# Patient Record
Sex: MALE | Race: WHITE | NOT HISPANIC OR LATINO | Employment: OTHER | URBAN - METROPOLITAN AREA
[De-identification: names, ages, dates, MRNs, and addresses within clinical notes are randomized per-mention and may not be internally consistent; named-entity substitution may affect disease eponyms.]

---

## 2019-03-11 ENCOUNTER — HOSPITAL ENCOUNTER (EMERGENCY)
Facility: HOSPITAL | Age: 49
Discharge: LEFT BEFORE DISCHARGE | End: 2019-03-11
Attending: EMERGENCY MEDICINE

## 2019-03-11 VITALS — HEIGHT: 68 IN | OXYGEN SATURATION: 94 % | TEMPERATURE: 97.1 F | RESPIRATION RATE: 18 BRPM | HEART RATE: 118 BPM

## 2019-12-20 ENCOUNTER — HOSPITAL ENCOUNTER (OUTPATIENT)
Dept: GENERAL RADIOLOGY | Facility: HOSPITAL | Age: 49
Discharge: HOME | End: 2019-12-20

## 2020-05-04 ENCOUNTER — OFFICE VISIT CONVERTED (OUTPATIENT)
Dept: OTOLARYNGOLOGY | Facility: CLINIC | Age: 50
End: 2020-05-04
Attending: OTOLARYNGOLOGY

## 2020-06-04 ENCOUNTER — OFFICE VISIT CONVERTED (OUTPATIENT)
Dept: NEUROLOGY | Facility: CLINIC | Age: 50
End: 2020-06-04
Attending: PSYCHIATRY & NEUROLOGY

## 2020-06-04 ENCOUNTER — CONVERSION ENCOUNTER (OUTPATIENT)
Dept: NEUROLOGY | Facility: CLINIC | Age: 50
End: 2020-06-04

## 2020-07-10 ENCOUNTER — HOSPITAL ENCOUNTER (OUTPATIENT)
Dept: MRI IMAGING | Facility: HOSPITAL | Age: 50
Discharge: HOME | End: 2020-07-10
Attending: PSYCHIATRY & NEUROLOGY

## 2021-05-10 NOTE — H&P
"   History and Physical      Patient Name: Haider Jacobs   Patient ID: [id number]   Sex: Male   YOB: 1970    Primary Care Provider: Ronen Ohara MD   Referring Provider: Ronen Ohara MD    Visit Date: May 4, 2020    Provider: Galen East MD   Location: [facility]   Location Address: [address] [place] [address], [address]  [id number]   Location Phone: [phone]          Chief Complaint     \"You have got to do something.\"       History Of Present Illness  Haider Jacobs is a 49 year old male with past medical history significant for generalized anxiety disorder, major depressive disorder, chronic pain syndrome, chronic rhinitis, and a previous spinal cord injury in 2006 who presents to the office today as a consult from Ronen Ohara MD for evaluation of tinnitus. He tells me that he initially noticed a bilateral hissing to occasional high-pitched ringing or sizzling sound around 1 year ago. This has steadily worsened over the last 6 months to the point where he is having difficulty sleeping. It seems to be worse if he clenches his teeth or moves his neck in certain ways. It is causing him significant anxiety. He has tried icing his neck without improvement. He has also been on duloxetine in the past which caused muscle spasticity. He has cut out caffeine for period of time but did not notice any improvement. He is only drinking 1 cup of coffee daily. He denies any aspirin use or over-the-counter supplements. He denies any issues with hearing loss, prior otologic trauma, or otologic surgery. He has never had any problems with ear infections. He is not experiencing any or vertigo. He does report occasional dizziness for which he is currently taking meclizine. He has a 1 pack/day smoker.       Past Medical History  Neuropathy; Pain management; Sleep apnea; Spinal cord injury unspecified site of spinal cord; Tinnitus         Past Surgical History  Spinal Surgery "         Medication List  baclofen 20 mg oral tablet; buspirone 15 mg oral tablet; gabapentin 400 mg oral capsule; hydrocodone-acetaminophen 7.5-325 mg oral tablet; hydroxyzine HCl 50 mg oral tablet; meclizine 25 mg oral tablet         Allergy List  NO KNOWN DRUG ALLERGIES         Family Medical History  Family history of stroke; Family history of heart disease; Family history of colon cancer in father         Social History  Tobacco (Current every day)         Review of Systems  · Constitutional  o Denies  o : fever, night sweats, weight loss  · Eyes  o Denies  o : discharge from eye, impaired vision  · HENT  o Admits  o : *See HPI  · Cardiovascular  o Denies  o : chest pain, irregular heart beats  · Respiratory  o Denies  o : shortness of breath, wheezing, coughing up blood  · Gastrointestinal  o Admits  o : heartburn, reflux  o Denies  o : vomiting blood  · Genitourinary  o Denies  o : frequency  · Integument  o Denies  o : rash, skin dryness  · Neurologic  o Denies  o : seizures, loss of balance, loss of consciousness, dizziness  · Endocrine  o Denies  o : cold intolerance, heat intolerance  · Heme-Lymph  o Denies  o : easy bleeding, anemia      Vitals  Date Time BP Position Site L\R Cuff Size HR RR TEMP (F) WT  HT  BMI kg/m2 BSA m2 O2 Sat HC       05/04/2020 01:34 PM      82 - R 16 98.4     94 %          Physical Examination  · Constitutional  o Appearance  o : well developed, well-nourished, alert and in no acute distress, voice clear and strong  · Head and Face  o Head  o :   § Inspection  § : no deformities or lesions  o Face  o :   § Inspection  § : No facial lesions; House-Brackmann I/VI bilaterally  § Palpation  § : No TMJ crepitus nor  muscle tenderness bilaterally  · Eyes  o Vision  o :   § Visual Fields  § : Extraocular movements are intact. No spontaneous or gaze-induced nystagmus.  o Conjunctivae  o : clear  o Sclerae  o : clear  o Pupils and Irises  o : pupils equal, round, and reactive to  light.   · Ears, Nose, Mouth and Throat  o Ears  o :   § External Ears  § : appearance within normal limits, no lesions present  § Otoscopic Examination  § : tympanic membrane appearance within normal limits bilaterally without perforations, well-aerated middle ears  § Hearing  § : intact to conversational voice both ears  o Nose  o :   § External Nose  § : appearance normal  § Intranasal Exam  § : mucosa within normal limits, vestibules normal, no intranasal lesions present, septum deviated to the left, sinuses non tender to percussion  o Oral Cavity  o :   § Oral Mucosa  § : oral mucosa normal without pallor or cyanosis  § Lips  § : lip appearance normal  § Teeth  § : normal dentition for age  § Gums  § : gums pink, non-swollen, no bleeding present  § Tongue  § : tongue appearance normal; normal mobility  § Palate  § : hard palate normal, soft palate appearance normal with symmetric mobility  o Throat  o :   § Oropharynx  § : no inflammation or lesions present, tonsils within normal limits  § Hypopharynx  § : Deferred secondary to gag reflex  § Larynx  § : Deferred secondary to gag reflex  · Neck  o Inspection/Palpation  o : normal appearance, no masses or tenderness, trachea midline; thyroid size normal, nontender, no nodules or masses present on palpation  · Respiratory  o Respiratory Effort  o : breathing unlabored  o Inspection of Chest  o : normal appearance, no retractions  · Cardiovascular  o Heart  o : regular rate and rhythm  · Lymphatic  o Neck  o : no lymphadenopathy present  o Supraclavicular Nodes  o : no lymphadenopathy present  o Preauricular Nodes  o : no lymphadenopathy present  · Skin and Subcutaneous Tissue  o General Inspection  o : Regarding face and neck - there are no rashes present, no lesions present, and no areas of discoloration  · Neurologic  o Cranial Nerves  o : cranial nerves II-XII are grossly intact bilaterally  o Gait and Station  o : normal gait, able to stand without  diffculty  · Psychiatric  o Judgement and Insight  o : judgment and insight intact  o Mood and Affect  o : mood normal, affect appropriate          Assessment  · Tinnitus of both ears     388.30/H93.13  · Anxiety     300.00/F41.9    Problems Reconciled  Plan  · Orders  o Audiometry, comprehensive, threshold evaluation and speech recognition [facility] ([id number]) - 388.30/H93.13, 300.00/F41.9 - 05/04/2020  o Tympanogram (Impedance Testing) [facility] ([id number]) - 388.30/H93.13, 300.00/F41.9 - 05/04/2020  · Medications  o nortriptyline 25 mg oral capsule   SIG: take 1 capsule (25 mg) by oral route once daily at bedtime for 30 days   DISP: (30) capsules with 3 refills  Prescribed on 05/04/2020     o Medications have been Reconciled  o Transition of Care or Provider Policy  · Instructions  o Impressions and findings were discussed Mr. Jacobs and his wife at great length. Currently, he is reporting bilateral tinnitus which has been present for a year but is significantly worsening over the past 6 months. It is causing a significant exacerbation of his anxiety symptoms and he is not sleeping well. He also has a history of depression for which he is not currently on any medications. We discussed the pathophysiology and natural history of tinnitus. Coping mechanisms were discussed. Options for further evaluation were discussed and I have recommended an audiogram for further work-up of his hearing. We discussed the association of tinnitus with hearing loss. At this point, he is quite anxious in the office and will therefore try him on a course of nortriptyline. We also discussed the possibility of trialing melatonin at home. He will follow-up after completion of his audiogram or sooner if needed.  o Electronically Identified Patient Education Materials Provided Electronically  · Correspondence  o ENT Letter to Referring MD (Ronen Ohara MD) - 05/04/2020            Electronically Signed by: Galen East MD -Author on May 4, 2020  02:28:20 PM
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
## 2021-05-15 VITALS
HEART RATE: 73 BPM | TEMPERATURE: 98.8 F | HEIGHT: 68 IN | DIASTOLIC BLOOD PRESSURE: 69 MMHG | BODY MASS INDEX: 31.83 KG/M2 | WEIGHT: 210 LBS | SYSTOLIC BLOOD PRESSURE: 110 MMHG

## 2021-05-15 VITALS — OXYGEN SATURATION: 94 % | RESPIRATION RATE: 16 BRPM | HEART RATE: 82 BPM | TEMPERATURE: 98.4 F

## 2021-08-26 ENCOUNTER — APPOINTMENT (OUTPATIENT)
Dept: MRI IMAGING | Facility: HOSPITAL | Age: 51
End: 2021-08-26

## 2021-08-26 ENCOUNTER — HOSPITAL ENCOUNTER (EMERGENCY)
Facility: HOSPITAL | Age: 51
Discharge: HOME | End: 2021-08-27
Attending: EMERGENCY MEDICINE | Admitting: EMERGENCY MEDICINE

## 2021-08-26 VITALS
WEIGHT: 189.15 LBS | BODY MASS INDEX: 28.67 KG/M2 | TEMPERATURE: 98.9 F | OXYGEN SATURATION: 95 % | DIASTOLIC BLOOD PRESSURE: 87 MMHG | SYSTOLIC BLOOD PRESSURE: 116 MMHG | RESPIRATION RATE: 17 BRPM | HEART RATE: 76 BPM | HEIGHT: 68 IN

## 2021-08-26 DIAGNOSIS — M54.16 LUMBAR BACK PAIN WITH RADICULOPATHY AFFECTING LOWER EXTREMITY: Primary | ICD-10-CM

## 2021-08-26 PROCEDURE — 96374 THER/PROPH/DIAG INJ IV PUSH: CPT

## 2021-08-26 PROCEDURE — 25010000002 LORAZEPAM PER 2 MG: Performed by: EMERGENCY MEDICINE

## 2021-08-26 PROCEDURE — 99283 EMERGENCY DEPT VISIT LOW MDM: CPT

## 2021-08-26 PROCEDURE — 72148 MRI LUMBAR SPINE W/O DYE: CPT

## 2021-08-26 RX ORDER — DIAZEPAM 10 MG/1
TABLET ORAL
COMMUNITY

## 2021-08-26 RX ORDER — HYDROXYZINE 50 MG/1
TABLET, FILM COATED ORAL
COMMUNITY

## 2021-08-26 RX ORDER — GABAPENTIN 400 MG/1
CAPSULE ORAL
COMMUNITY

## 2021-08-26 RX ORDER — BUSPIRONE HYDROCHLORIDE 15 MG/1
TABLET ORAL
COMMUNITY

## 2021-08-26 RX ORDER — SODIUM CHLORIDE 0.9 % (FLUSH) 0.9 %
10 SYRINGE (ML) INJECTION AS NEEDED
Status: DISCONTINUED | OUTPATIENT
Start: 2021-08-26 | End: 2021-08-27 | Stop reason: HOSPADM

## 2021-08-26 RX ORDER — BACLOFEN 20 MG/1
TABLET ORAL
COMMUNITY

## 2021-08-26 RX ORDER — LORAZEPAM 2 MG/ML
1 INJECTION INTRAMUSCULAR ONCE
Status: COMPLETED | OUTPATIENT
Start: 2021-08-26 | End: 2021-08-26

## 2021-08-26 RX ORDER — OXYCODONE HYDROCHLORIDE AND ACETAMINOPHEN 5; 325 MG/1; MG/1
1 TABLET ORAL ONCE
Status: COMPLETED | OUTPATIENT
Start: 2021-08-26 | End: 2021-08-26

## 2021-08-26 RX ORDER — HYDROCODONE BITARTRATE AND ACETAMINOPHEN 7.5; 325 MG/1; MG/1
TABLET ORAL
COMMUNITY

## 2021-08-26 RX ADMIN — OXYCODONE HYDROCHLORIDE AND ACETAMINOPHEN 1 TABLET: 5; 325 TABLET ORAL at 22:50

## 2021-08-26 RX ADMIN — LORAZEPAM 1 MG: 2 INJECTION INTRAMUSCULAR; INTRAVENOUS at 20:38
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
## 2021-08-27 NOTE — ED NOTES
PATIENT REQUESTING FOOD, DRINK, AND PAIN MEDICATIONS. DR. SHOOK NOTIFIED.     Angelita Batista, [employer]EIGN  08/26/21 5259

## 2021-09-02 ENCOUNTER — HOSPITAL ENCOUNTER (EMERGENCY)
Facility: HOSPITAL | Age: 51
Discharge: LEFT BEFORE DISCHARGE | End: 2021-09-02

## 2021-09-02 VITALS
SYSTOLIC BLOOD PRESSURE: 130 MMHG | DIASTOLIC BLOOD PRESSURE: 90 MMHG | HEART RATE: 95 BPM | RESPIRATION RATE: 22 BRPM | TEMPERATURE: 98.4 F | BODY MASS INDEX: 28.27 KG/M2 | OXYGEN SATURATION: 96 % | WEIGHT: 186.51 LBS | HEIGHT: 68 IN

## 2021-09-02 LAB
ALBUMIN SERPL-MCNC: 4.4 G/DL (ref 3.5–5.2)
ALBUMIN/GLOB SERPL: 1.7 G/DL
ALP SERPL-CCNC: 88 U/L (ref 39–117)
ALT SERPL W P-5'-P-CCNC: 17 U/L (ref 1–41)
ANION GAP SERPL CALCULATED.3IONS-SCNC: 12.9 MMOL/L (ref 5–15)
APAP SERPL-MCNC: <5 MCG/ML (ref 0–30)
AST SERPL-CCNC: 30 U/L (ref 1–40)
BASOPHILS # BLD AUTO: 0.05 10*3/MM3 (ref 0–0.2)
BASOPHILS NFR BLD AUTO: 0.4 % (ref 0–1.5)
BILIRUB SERPL-MCNC: 0.5 MG/DL (ref 0–1.2)
BUN SERPL-MCNC: 22 MG/DL (ref 6–20)
BUN/CREAT SERPL: 25.9 (ref 7–25)
CALCIUM SPEC-SCNC: 9.6 MG/DL (ref 8.6–10.5)
CHLORIDE SERPL-SCNC: 106 MMOL/L (ref 98–107)
CO2 SERPL-SCNC: 22.1 MMOL/L (ref 22–29)
CREAT SERPL-MCNC: 0.85 MG/DL (ref 0.76–1.27)
DEPRECATED RDW RBC AUTO: 41.6 FL (ref 37–54)
EOSINOPHIL # BLD AUTO: 0.01 10*3/MM3 (ref 0–0.4)
EOSINOPHIL NFR BLD AUTO: 0.1 % (ref 0.3–6.2)
ERYTHROCYTE [DISTWIDTH] IN BLOOD BY AUTOMATED COUNT: 13.3 % (ref 12.3–15.4)
ETHANOL BLD-MCNC: <10 MG/DL (ref 0–10)
ETHANOL UR QL: <0.01 %
GFR SERPL CREATININE-BSD FRML MDRD: 95 ML/MIN/1.73
GLOBULIN UR ELPH-MCNC: 2.6 GM/DL
GLUCOSE SERPL-MCNC: 107 MG/DL (ref 65–99)
HCT VFR BLD AUTO: 46.8 % (ref 37.5–51)
HGB BLD-MCNC: 16.1 G/DL (ref 13–17.7)
IMM GRANULOCYTES # BLD AUTO: 0.03 10*3/MM3 (ref 0–0.05)
IMM GRANULOCYTES NFR BLD AUTO: 0.3 % (ref 0–0.5)
INR PPP: 1.01 (ref 2–3)
LYMPHOCYTES # BLD AUTO: 0.95 10*3/MM3 (ref 0.7–3.1)
LYMPHOCYTES NFR BLD AUTO: 8 % (ref 19.6–45.3)
MCH RBC QN AUTO: 29.9 PG (ref 26.6–33)
MCHC RBC AUTO-ENTMCNC: 34.4 G/DL (ref 31.5–35.7)
MCV RBC AUTO: 86.8 FL (ref 79–97)
MONOCYTES # BLD AUTO: 0.48 10*3/MM3 (ref 0.1–0.9)
MONOCYTES NFR BLD AUTO: 4 % (ref 5–12)
NEUTROPHILS NFR BLD AUTO: 10.41 10*3/MM3 (ref 1.7–7)
NEUTROPHILS NFR BLD AUTO: 87.2 % (ref 42.7–76)
NRBC BLD AUTO-RTO: 0 /100 WBC (ref 0–0.2)
PLATELET # BLD AUTO: 151 10*3/MM3 (ref 140–450)
PMV BLD AUTO: 11.4 FL (ref 6–12)
POTASSIUM SERPL-SCNC: 3.8 MMOL/L (ref 3.5–5.2)
PROT SERPL-MCNC: 7 G/DL (ref 6–8.5)
PROTHROMBIN TIME: 11.1 SECONDS (ref 9.4–12)
RBC # BLD AUTO: 5.39 10*6/MM3 (ref 4.14–5.8)
SALICYLATES SERPL-MCNC: <0.3 MG/DL
SODIUM SERPL-SCNC: 141 MMOL/L (ref 136–145)
TSH SERPL DL<=0.05 MIU/L-ACNC: 0.29 UIU/ML (ref 0.27–4.2)
WBC # BLD AUTO: 11.93 10*3/MM3 (ref 3.4–10.8)

## 2021-09-02 PROCEDURE — 82077 ASSAY SPEC XCP UR&BREATH IA: CPT | Performed by: PHYSICIAN ASSISTANT

## 2021-09-02 PROCEDURE — 84443 ASSAY THYROID STIM HORMONE: CPT | Performed by: PHYSICIAN ASSISTANT

## 2021-09-02 PROCEDURE — 80179 DRUG ASSAY SALICYLATE: CPT | Performed by: PHYSICIAN ASSISTANT

## 2021-09-02 PROCEDURE — 80053 COMPREHEN METABOLIC PANEL: CPT | Performed by: PHYSICIAN ASSISTANT

## 2021-09-02 PROCEDURE — 80143 DRUG ASSAY ACETAMINOPHEN: CPT | Performed by: PHYSICIAN ASSISTANT

## 2021-09-02 PROCEDURE — 85610 PROTHROMBIN TIME: CPT | Performed by: PHYSICIAN ASSISTANT

## 2021-09-02 PROCEDURE — 99211 OFF/OP EST MAY X REQ PHY/QHP: CPT

## 2021-09-02 PROCEDURE — 85025 COMPLETE CBC W/AUTO DIFF WBC: CPT | Performed by: PHYSICIAN ASSISTANT

## 2022-01-16 ENCOUNTER — HOSPITAL ENCOUNTER (EMERGENCY)
Facility: HOSPITAL | Age: 52
Discharge: HOME | End: 2022-01-16
Attending: EMERGENCY MEDICINE | Admitting: EMERGENCY MEDICINE

## 2022-01-16 VITALS
OXYGEN SATURATION: 98 % | HEART RATE: 77 BPM | RESPIRATION RATE: 18 BRPM | BODY MASS INDEX: 28.36 KG/M2 | HEIGHT: 68 IN | TEMPERATURE: 98.6 F | SYSTOLIC BLOOD PRESSURE: 115 MMHG | DIASTOLIC BLOOD PRESSURE: 75 MMHG

## 2022-01-16 DIAGNOSIS — M54.32 SCIATICA OF LEFT SIDE: Primary | ICD-10-CM

## 2022-01-16 PROCEDURE — 25010000002 KETOROLAC TROMETHAMINE PER 15 MG: Performed by: NURSE PRACTITIONER

## 2022-01-16 PROCEDURE — 25010000002 DEXAMETHASONE PER 1 MG: Performed by: NURSE PRACTITIONER

## 2022-01-16 PROCEDURE — 99282 EMERGENCY DEPT VISIT SF MDM: CPT

## 2022-01-16 PROCEDURE — 96372 THER/PROPH/DIAG INJ SC/IM: CPT

## 2022-01-16 RX ORDER — KETOROLAC TROMETHAMINE 30 MG/ML
30 INJECTION, SOLUTION INTRAMUSCULAR; INTRAVENOUS ONCE
Status: COMPLETED | OUTPATIENT
Start: 2022-01-16 | End: 2022-01-16

## 2022-01-16 RX ORDER — DEXAMETHASONE SODIUM PHOSPHATE 10 MG/ML
10 INJECTION INTRAMUSCULAR; INTRAVENOUS ONCE
Status: COMPLETED | OUTPATIENT
Start: 2022-01-16 | End: 2022-01-16

## 2022-01-16 RX ADMIN — DEXAMETHASONE SODIUM PHOSPHATE 10 MG: 10 INJECTION INTRAMUSCULAR; INTRAVENOUS at 11:07

## 2022-01-16 RX ADMIN — KETOROLAC TROMETHAMINE 30 MG: 30 INJECTION, SOLUTION INTRAMUSCULAR; INTRAVENOUS at 11:07

## 2022-01-16 NOTE — ED PROVIDER NOTES
"Subjective   Pt reports \"burning\" and \"shocking\" sensation in his low back that \"goes up and down my spine\" and also down his left leg. He reports this has been happening for 2-3 years. Yesterday he reports he was outside using his three wheel scooter and felt a severe shocking sensation on his left lower back that was worse than previous episodes. He reports that he feels that someone is putting something on his skin that is causing the pain and can be controlled by a person that is watching him. He feels that the substance can conduct electricity and there must be a mechanism that knows when he enters his home because that is when he feels the pain. He believes the substance has been put on his couch and is absorbed in his skin so he has been placing cardboard on his couch before he sits down. He does not believe that pain is coming from his spine \"because I know my body and my injury\".       History provided by:  Patient  Back Pain  Location:  Lumbar spine  Radiates to:  L posterior upper leg  Pain severity:  Severe  Worse during: worse when inside his home.  Progression:  Worsening  Context: not emotional stress, not falling, not jumping from heights, not lifting heavy objects, not MCA, not MVA, not occupational injury, not pedestrian accident, not physical stress, not recent illness, not recent injury and not twisting    Relieved by:  Nothing  Associated symptoms: no abdominal pain, no chest pain, no fever, no headaches and no numbness        Review of Systems   Constitutional: Negative for chills and fever.   HENT: Negative for congestion, ear pain and sore throat.    Eyes: Negative for pain.   Respiratory: Negative for cough, chest tightness and shortness of breath.    Cardiovascular: Negative for chest pain.   Gastrointestinal: Negative for abdominal pain, diarrhea, nausea and vomiting.   Genitourinary: Negative for flank pain and hematuria.   Musculoskeletal: Positive for back pain. Negative for joint " swelling.   Skin: Negative for pallor.   Neurological: Negative for dizziness, seizures, facial asymmetry, light-headedness, numbness and headaches.   All other systems reviewed and are negative.      Past Medical History:   Diagnosis Date   • Anxiety    • Spinal cord injury at C1-C4 level without spinal injury (HCC)        No Known Allergies    Past Surgical History:   Procedure Laterality Date   • BACK SURGERY      C2-7 Fusion       History reviewed. No pertinent family history.    Social History     Socioeconomic History   • Marital status: Single   Tobacco Use   • Smoking status: Current Every Day Smoker     Packs/day: 1.00   • Smokeless tobacco: Never Used   Vaping Use   • Vaping Use: Never used   Substance and Sexual Activity   • Alcohol use: Not Currently   • Drug use: Not Currently   • Sexual activity: Defer           Objective   Physical Exam  Vitals and nursing note reviewed.   Constitutional:       General: He is not in acute distress.     Appearance: Normal appearance. He is not toxic-appearing.   HENT:      Head: Normocephalic and atraumatic.      Mouth/Throat:      Mouth: Mucous membranes are moist.   Eyes:      General: No scleral icterus.  Cardiovascular:      Rate and Rhythm: Normal rate and regular rhythm.      Pulses: Normal pulses.      Heart sounds: Normal heart sounds.   Pulmonary:      Effort: Pulmonary effort is normal. No respiratory distress.      Breath sounds: Normal breath sounds.   Abdominal:      General: Abdomen is flat.      Palpations: Abdomen is soft.      Tenderness: There is no abdominal tenderness.   Musculoskeletal:         General: Normal range of motion.      Cervical back: Normal, normal range of motion and neck supple.      Thoracic back: Normal.      Lumbar back: Normal.   Skin:     General: Skin is warm and dry.   Neurological:      Mental Status: He is alert and oriented to person, place, and time. Mental status is at baseline.         Procedures           ED Course                                                  MDM  Number of Diagnoses or Management Options  Sciatica of left side: new and does not require workup  Diagnosis management comments: Pt reports he has seen his primary provider 2 weeks ago and had blood work performed. He also sees a psychiatrist and agrees to follow up with her. Spoke about how it is possible that the pain and sensation is coming from his spine so he needs to follow up with his primary for referral to neurologist or spine specialist since he does not currently see one. The description of his pain does seem to originate from his spine. He denies recent injury. Since he has established psychiatric care established that will not be worked up on this visit.     The patient´s symptoms are consistent with musculoskeletal back pain. The patient is now resting comfortably, feels better, is alert, talkative, interactive and in no distress. The repeat examination is unremarkable and benign. The patient is neurologically intact and is ambulatory in the ED. The patient has no fever, no bowel or bladder incontinence, no saddle anesthesia, and is otherwise alert and well appearing. The history, physical exam, and diagnostics (if any) do not suggest the presence of acute spinal epidural abscess, acute spinal epidural bleed, cauda equina syndrome, abdominal aortic aneurysm, aortic dissection or other process requiring further testing, treatment or consultation in the emergency department. The vital signs have been stable. The patient's condition is stable and appropriate for discharge. The patient will pursue further outpatient evaluation with the primary care physician or other designated for consulting position as indicated in the discharge instructions.    Risk of Complications, Morbidity, and/or Mortality  Presenting problems: low  Diagnostic procedures: minimal  Management options: low    Patient Progress  Patient progress: stable      Final diagnoses:   Sciatica  of left side       ED Disposition  ED Disposition     ED Disposition Condition Comment    Discharge Stable           Ronen Ohara MD  [address] [place] [address]  [phone]    In 3 days           Medication List      No changes were made to your prescriptions during this visit.          George Spann, APRN  01/16/22 1152

## 2022-01-16 NOTE — DISCHARGE INSTRUCTIONS
Follow up with Dr. Ohara for further workup and possible referral for your back pain. Continue to follow up with your psychiatrist as well.

## 2022-01-16 NOTE — ED NOTES
"Pt states that \"something\" is getting on him and causing him this pain in his back and leg. He states that he always gets shocked when he comes back into his house. He states that he was outside in his scooter and \"something\" crawled up on him. He reports that he sets on the couch with cardboard and pillows to keep whatever it is from shocking him but eventually it breaks through that barrier and does shock him. The patient states that he has a spinal cord injury for the past 15 years and he knows his body and what is happening to him it is not his injury. Pt does report that he has thought about harming himself because of what is going on in his house but does not have a plan. He also states that he does not want to harm himself today. This was reported to the provider.      Stacey Ren RN  01/16/22 2574    "

## 2022-02-04 ENCOUNTER — APPOINTMENT (OUTPATIENT)
Dept: CT IMAGING | Facility: HOSPITAL | Age: 52
End: 2022-02-04

## 2022-02-04 ENCOUNTER — APPOINTMENT (OUTPATIENT)
Dept: GENERAL RADIOLOGY | Facility: HOSPITAL | Age: 52
End: 2022-02-04

## 2022-02-04 ENCOUNTER — HOSPITAL ENCOUNTER (EMERGENCY)
Facility: HOSPITAL | Age: 52
Discharge: HOME | End: 2022-02-04
Attending: EMERGENCY MEDICINE | Admitting: EMERGENCY MEDICINE

## 2022-02-04 VITALS
HEIGHT: 68 IN | BODY MASS INDEX: 28.2 KG/M2 | SYSTOLIC BLOOD PRESSURE: 122 MMHG | HEART RATE: 112 BPM | OXYGEN SATURATION: 95 % | DIASTOLIC BLOOD PRESSURE: 82 MMHG | TEMPERATURE: 98.4 F | WEIGHT: 186.07 LBS | RESPIRATION RATE: 22 BRPM

## 2022-02-04 DIAGNOSIS — S42.034A CLOSED NONDISPLACED FRACTURE OF ACROMIAL END OF RIGHT CLAVICLE, INITIAL ENCOUNTER: Primary | ICD-10-CM

## 2022-02-04 LAB — CREAT BLDA-MCNC: 0.8 MG/DL

## 2022-02-04 PROCEDURE — 72131 CT LUMBAR SPINE W/O DYE: CPT

## 2022-02-04 PROCEDURE — 25010000002 ONDANSETRON PER 1 MG: Performed by: EMERGENCY MEDICINE

## 2022-02-04 PROCEDURE — 25010000002 HYDROMORPHONE 1 MG/ML SOLUTION: Performed by: EMERGENCY MEDICINE

## 2022-02-04 PROCEDURE — 0 IOPAMIDOL PER 1 ML: Performed by: EMERGENCY MEDICINE

## 2022-02-04 PROCEDURE — 74177 CT ABD & PELVIS W/CONTRAST: CPT

## 2022-02-04 PROCEDURE — 36415 COLL VENOUS BLD VENIPUNCTURE: CPT

## 2022-02-04 PROCEDURE — 82565 ASSAY OF CREATININE: CPT

## 2022-02-04 PROCEDURE — 99283 EMERGENCY DEPT VISIT LOW MDM: CPT

## 2022-02-04 PROCEDURE — 96374 THER/PROPH/DIAG INJ IV PUSH: CPT

## 2022-02-04 PROCEDURE — 96375 TX/PRO/DX INJ NEW DRUG ADDON: CPT

## 2022-02-04 PROCEDURE — 72125 CT NECK SPINE W/O DYE: CPT

## 2022-02-04 PROCEDURE — 73030 X-RAY EXAM OF SHOULDER: CPT

## 2022-02-04 PROCEDURE — 70450 CT HEAD/BRAIN W/O DYE: CPT

## 2022-02-04 PROCEDURE — 96376 TX/PRO/DX INJ SAME DRUG ADON: CPT

## 2022-02-04 RX ORDER — ONDANSETRON 2 MG/ML
4 INJECTION INTRAMUSCULAR; INTRAVENOUS ONCE
Status: COMPLETED | OUTPATIENT
Start: 2022-02-04 | End: 2022-02-04

## 2022-02-04 RX ORDER — OXYCODONE AND ACETAMINOPHEN 7.5; 325 MG/1; MG/1
1 TABLET ORAL EVERY 6 HOURS PRN
Qty: 12 TABLET | Refills: 0 | Status: SHIPPED | OUTPATIENT
Start: 2022-02-04

## 2022-02-04 RX ORDER — OXYCODONE AND ACETAMINOPHEN 10; 325 MG/1; MG/1
1 TABLET ORAL ONCE
Status: COMPLETED | OUTPATIENT
Start: 2022-02-04 | End: 2022-02-04

## 2022-02-04 RX ADMIN — HYDROMORPHONE HYDROCHLORIDE 0.5 MG: 1 INJECTION, SOLUTION INTRAMUSCULAR; INTRAVENOUS; SUBCUTANEOUS at 16:53

## 2022-02-04 RX ADMIN — OXYCODONE HYDROCHLORIDE AND ACETAMINOPHEN 1 TABLET: 10; 325 TABLET ORAL at 17:45

## 2022-02-04 RX ADMIN — HYDROMORPHONE HYDROCHLORIDE 0.5 MG: 1 INJECTION, SOLUTION INTRAMUSCULAR; INTRAVENOUS; SUBCUTANEOUS at 16:04

## 2022-02-04 RX ADMIN — IOPAMIDOL 100 ML: 755 INJECTION, SOLUTION INTRAVENOUS at 16:38

## 2022-02-04 RX ADMIN — ONDANSETRON 4 MG: 2 INJECTION INTRAMUSCULAR; INTRAVENOUS at 16:04

## 2022-02-04 NOTE — ED NOTES
Patient back from CT and xray.  Patient given more pain medicine.     Jennifer Mera, RN  02/04/22 4840

## 2022-02-04 NOTE — ED PROVIDER NOTES
Time: 4:15 PM EST  Arrived by: ambulance  Chief Complaint: Fall down 13 stairs  History provided by: Patient  History is limited by: N/A     History of Present Illness:  Patient is a 51 y.o. year old male that presents to the emergency department with fall down 13 steps at home prior to arrival.  Patient's states a good handrail broke loose and he tried to sit down but fell down the stairs backwards.  He has positive loss of consciousness.  Complains of right shoulder pain, low back pain, pelvic/scrotal pain.  Pain is constant, throbbing, worse with movement.    HPI    Similar Symptoms Previously: Patient has a history of traumatic cervical spine injury and uses a walker at baseline.  Recently seen: No      Patient Care Team  Primary Care Provider: Ronen Ohara MD    Past Medical History:     No Known Allergies  Past Medical History:   Diagnosis Date   • Anxiety    • Spinal cord injury at C1-C4 level without spinal injury (HCC)      Past Surgical History:   Procedure Laterality Date   • BACK SURGERY      C2-7 Fusion     History reviewed. No pertinent family history.    Home Medications:  Prior to Admission medications    Medication Sig Start Date End Date Taking? Authorizing Provider   baclofen (LIORESAL) 20 MG tablet baclofen 20 mg oral tablet take 1 tablet by oral route 4 times a day   Active    ProviderJosé Antonio MD   busPIRone (BUSPAR) 15 MG tablet buspirone 15 mg oral tablet take 1 tablet (15 mg) by oral route 2 times per day   Active    ProviderJosé Antonio MD   diazePAM (Valium) 10 MG tablet Valium 10 mg oral tablet take 1 tablet 1 hr prior to MRI, repeat 30 min prior to MRI prn.   Active    ProviderJosé Antonio MD   gabapentin (NEURONTIN) 400 MG capsule gabapentin 400 mg oral capsule take 1 capsule (400 mg) by oral route 3 times per day   Active    ProviderJosé Antonio MD   HYDROcodone-acetaminophen (NORCO) 7.5-325 MG per tablet hydrocodone-acetaminophen 7.5-325 mg oral tablet take 1 tablet by  "oral route 3 times a day   Active    Provider, MD José Antonio   hydrOXYzine (ATARAX) 50 MG tablet hydroxyzine HCl 50 mg oral tablet take 1 tablet by oral route daily   Active    ProviderJosé Antonio MD   QUEtiapine Fumarate (SEROQUEL PO) Take  by mouth.    Provider, MD José Antonio        Social History:   Social History     Tobacco Use   • Smoking status: Current Every Day Smoker     Packs/day: 1.00   • Smokeless tobacco: Never Used   Vaping Use   • Vaping Use: Never used   Substance Use Topics   • Alcohol use: Not Currently   • Drug use: Not Currently     Recent travel: no     Review of Systems:  Review of Systems   Constitutional: Negative for chills and fever.   HENT: Negative for congestion, rhinorrhea and sore throat.    Eyes: Negative for pain and visual disturbance.   Respiratory: Negative for apnea, cough, chest tightness and shortness of breath.    Cardiovascular: Negative for chest pain and palpitations.   Gastrointestinal: Negative for abdominal pain, diarrhea, nausea and vomiting.   Genitourinary: Positive for testicular pain. Negative for difficulty urinating and dysuria.   Musculoskeletal: Positive for arthralgias and back pain. Negative for joint swelling and myalgias.   Skin: Negative for color change.   Neurological: Positive for headaches. Negative for seizures.   Psychiatric/Behavioral: Negative.    All other systems reviewed and are negative.       Physical Exam:  /82   Pulse 112   Temp 98.4 °F (36.9 °C) (Oral)   Resp 22   Ht 172.7 cm (68\")   Wt 84.4 kg (186 lb 1.1 oz)   SpO2 95%   BMI 28.29 kg/m²     Physical Exam  Vitals and nursing note reviewed.   Constitutional:       Appearance: Normal appearance.      Comments: In pain   HENT:      Head: Normocephalic.      Comments: Large right lateral parietal hematoma.  No laceration.     Nose: Nose normal.      Mouth/Throat:      Mouth: Mucous membranes are dry.   Eyes:      Extraocular Movements: Extraocular movements intact.      " Pupils: Pupils are equal, round, and reactive to light.   Cardiovascular:      Rate and Rhythm: Normal rate and regular rhythm.      Heart sounds: Normal heart sounds.   Pulmonary:      Effort: Pulmonary effort is normal.      Breath sounds: Normal breath sounds.   Abdominal:      General: Bowel sounds are normal.      Palpations: Abdomen is soft.      Tenderness: There is no abdominal tenderness.   Genitourinary:     Testes: Normal.   Musculoskeletal:         General: Tenderness (Mild bony tenderness to right lateral deltoid.  Mid lumbar spinal tenderness to palpation, mild) present. No swelling. Normal range of motion.      Cervical back: Normal range of motion and neck supple.   Skin:     General: Skin is warm and dry.      Coloration: Skin is not jaundiced.   Neurological:      General: No focal deficit present.      Mental Status: He is alert and oriented to person, place, and time. Mental status is at baseline.   Psychiatric:         Mood and Affect: Mood normal.         Behavior: Behavior normal.         Judgment: Judgment normal.                Medications in the Emergency Department:  Medications   HYDROmorphone (DILAUDID) injection 0.5 mg (0.5 mg Intravenous Given 2/4/22 1604)   ondansetron (ZOFRAN) injection 4 mg (4 mg Intravenous Given 2/4/22 1604)   iopamidol (ISOVUE-370) 76 % injection 100 mL (100 mL Intravenous Given 2/4/22 1638)   HYDROmorphone (DILAUDID) injection 0.5 mg (0.5 mg Intravenous Given 2/4/22 1653)   oxyCODONE-acetaminophen (PERCOCET)  MG per tablet 1 tablet (1 tablet Oral Given 2/4/22 1745)        Labs  Lab Results (last 24 hours)     Procedure Component Value Units Date/Time    POC Creatinine [[id number]] Collected: 02/04/22 1629    Specimen: Blood Updated: 02/04/22 1650     Creatinine 0.80 mg/dL      Comment: Serial Number: [id number]:  [id number]        GFR MDRD  --     GFR MDRD Non African American 101 mL/min/1.73 sq.M            Imaging:  XR Shoulder 2+ View  Right    Result Date: 2/4/2022  PROCEDURE: XR SHOULDER 2+ VW RIGHT  COMPARISON: None  INDICATIONS: RIGHT CLAVICLE, SHOULDER AND SCAPULA PAIN AFTER FALL TODAY  FINDINGS:  There is a comminuted fracture of the distal clavicle.  There is no AC joint separation.  The glenohumeral joint is congruent.       Comminuted distal clavicle fracture.  Fracture extends into the acromioclavicular joint.      CARY MCKEON MD       Electronically Signed and Approved By: ACRY MCKEON MD on 2/04/2022 at 16:58             CT Head Without Contrast    Result Date: 2/4/2022  PROCEDURE: CT HEAD WO CONTRAST  COMPARISON:  [facility], CT, HEAD W/O CONTRAST, 5/27/2020, 14:32. INDICATIONS: Fall backward down 13 steps.  Headache, low back pain, scrotal pain, right shoulder pain  PROTOCOL:   Standard imaging protocol performed    RADIATION:   DLP: 1079 mGy*cm   MA and/or KV was adjusted to minimize radiation dose.     TECHNIQUE: After obtaining the patient's consent, CT images were obtained without non-ionic intravenous contrast material.  FINDINGS:  The ventricles, sulci, gyri and cisterns have an unremarkable appearance.  There is no mass, mass effect or midline shift.  There are no abnormal extra-axial fluid collections or areas of acute hemorrhage.  There is no acute bony abnormality.  There is soft tissue swelling with scalp hematoma over the lateral right parietal occipital region.       Right lateral parieto-occipital subcutaneous hematoma.  No acute intracranial abnormality or skull fracture.     CARY MCKEON MD       Electronically Signed and Approved By: CARY MCKEON MD on 2/04/2022 at 16:53             CT Cervical Spine Without Contrast    Result Date: 2/4/2022  PROCEDURE: CT CERVICAL SPINE WO CONTRAST  COMPARISON: None  INDICATIONS: Fall backward down 13 steps.  Headache, low back pain, scrotal pain, right shoulder pain  PROTOCOL:   Standard imaging protocol performed    RADIATION:   DLP: 480.4 mGy*cm   MA and/or KV was  adjusted to minimize radiation dose.     TECHNIQUE: After obtaining the patient's consent, multi-planar CT images were created without contrast material.   FINDINGS:  There are postsurgical changes from posterior cervical spine fusion of C3-C4 and C5.  There appears to be complete osseous integration of the facets at these levels.  There is slight straightening of the normal lordotic curvature.  There is retrolisthesis of C5 on C6 of about 2 mm.  There is no hardware complication.  There is no acute fracture.  The paraspinal spaces are normal.  The lung apices are clear.  The visualized brain is unremarkable.       Postsurgical and degenerative changes of the cervical spine.  No convincing hardware complication or acute osseous injury.     CARY MCKEON MD       Electronically Signed and Approved By: CARY MCKEON MD on 2/04/2022 at 16:52             CT Lumbar Spine Without Contrast    Result Date: 2/4/2022  PROCEDURE: CT LUMBAR SPINE WO CONTRAST  COMPARISON: [facility], MR, MRI LUMBAR SPINE WO CONTRAST, 8/26/2021, 21:03.  INDICATIONS: Fall backward down 13 steps.  Headache, low back pain, scrotal pain, right shoulder pain  PROTOCOL:   Standard imaging protocol performed    RADIATION:   DLP: 1032.2 mGy*cm   Automated exposure control was utilized to minimize radiation dose.  TECHNIQUE: After obtaining the patient's consent, multi-planar CT images were created without intravenous contrast material.   FINDINGS:  There is an old compression fracture of L1.  There is no evidence of an acute lumbar spine fracture.  There are endplate osteophytes at multiple levels most notably at L4-5 and L5-S1.  At the L5-S1 level there is narrowing of the intervertebral disc space with disc desiccation and facet hypertrophic changes.  There is resultant bilateral neural foraminal narrowing.  There is neural foraminal narrowing at the L4-5 from broad-based disc bulging.        No acute fracture.  There is an old L1  compression fracture.  There are degenerative disc disease changes at the L4-5 and L5-S1 levels which appears similar to the previous MRI with disc bulging resulting in neural foraminal narrowing.  There is no definitive free disc fragment.     CARY MCKEON MD       Electronically Signed and Approved By: CARY MCKEON MD on 2/04/2022 at 17:01             CT Abdomen Pelvis With Contrast    Result Date: 2/4/2022  PROCEDURE: CT ABDOMEN PELVIS W CONTRAST  COMPARISON: [facility], CT, CTA ABD RUNOFF, 12/23/2019, 8:26.  INDICATIONS: Fall backward down 13 steps.  Headache, low back pain, pelvic pain/scrotal pain, right shoulder pain  TECHNIQUE: After obtaining the patient's consent, CT images were created with non-ionic intravenous contrast material.   PROTOCOL:   Standard imaging protocol performed    RADIATION:   DLP: 692.1 mGy*cm   Automated exposure control was utilized to minimize radiation dose. CONTRAST: 100 cc Isovue 370 I.V. LABS:   eGFR: >60 ml/min/1.73m2  FINDINGS:  LIVER: Normal.  No enlargement, atrophy, abnormal density, or significant focal lesion.  BILIARY: Normal.  No visible dilatation or calcification.  PANCREAS: Normal.  No lesion, fluid collection, ductal dilatation, or atrophy.  SPLEEN: Normal.  No enlargement or focal lesion.  KIDNEYS: Normal.  No mass, obstruction, or calcification.  ADRENALS: Normal.  No mass or enlargement.  AORTA/VASCULAR: Normal.  No aneurysm or dissection.  RETROPERITONEUM: Normal.  No mass or adenopathy.  BOWEL/MESENTERY: Normal.  No visible mass, obstruction, or bowel wall thickening.  ABDOMINAL WALL: Normal.  No mass or hernia.  URINARY BLADDER: Normal.  No visible focal wall thickening, lesion, or calculus.  PELVIC NODES: Normal.  No adenopathy.  PELVIC ORGANS: Normal.  No visible mass.  Pelvic organs appropriate for patient age.  BONES: There is a compression deformity of L1 which is old.  There are no convincing fractures of the visualized abdomen or pelvic  skeletal system. LUNG BASES: Normal.  No visible pulmonary or pleural disease.        No acute disease.    CARY MCKEON MD       Electronically Signed and Approved By: CARY MCKEON MD on 2/04/2022 at 16:58               Procedures:  Procedures    Progress                            Medical Decision Making:  MDM  Number of Diagnoses or Management Options     Amount and/or Complexity of Data Reviewed  Clinical lab tests: reviewed  Tests in the radiology section of CPT®: reviewed  Independent visualization of images, tracings, or specimens: yes    Risk of Complications, Morbidity, and/or Mortality  Presenting problems: moderate  Management options: low         Final diagnoses:   Closed nondisplaced fracture of acromial end of right clavicle, initial encounter        Disposition:  ED Disposition     ED Disposition Condition Comment    Discharge Stable           This medical record created using voice recognition software and may contain unintended errors.         Ciro Fuller MD  02/04/22 2041

## 2022-02-04 NOTE — ED NOTES
Patient log rolled for exam.  Spineboard removed per ER MD.     Jennifer Mera, RN  02/04/22 4632

## 2022-02-07 ENCOUNTER — HOSPITAL ENCOUNTER (EMERGENCY)
Facility: HOSPITAL | Age: 52
Discharge: LEFT BEFORE DISCHARGE | End: 2022-02-07

## 2022-02-07 VITALS
WEIGHT: 171 LBS | SYSTOLIC BLOOD PRESSURE: 125 MMHG | DIASTOLIC BLOOD PRESSURE: 101 MMHG | RESPIRATION RATE: 24 BRPM | TEMPERATURE: 97.5 F | OXYGEN SATURATION: 95 % | HEART RATE: 93 BPM | HEIGHT: 68 IN | BODY MASS INDEX: 25.91 KG/M2

## 2022-02-07 LAB
ALBUMIN SERPL-MCNC: 4.3 G/DL (ref 3.5–5.2)
ALBUMIN/GLOB SERPL: 1.4 G/DL
ALP SERPL-CCNC: 115 U/L (ref 39–117)
ALT SERPL W P-5'-P-CCNC: 14 U/L (ref 1–41)
ANION GAP SERPL CALCULATED.3IONS-SCNC: 12.8 MMOL/L (ref 5–15)
APAP SERPL-MCNC: <5 MCG/ML (ref 0–30)
AST SERPL-CCNC: 18 U/L (ref 1–40)
BASOPHILS # BLD AUTO: 0.07 10*3/MM3 (ref 0–0.2)
BASOPHILS NFR BLD AUTO: 0.7 % (ref 0–1.5)
BILIRUB SERPL-MCNC: 0.6 MG/DL (ref 0–1.2)
BUN SERPL-MCNC: 21 MG/DL (ref 6–20)
BUN/CREAT SERPL: 25 (ref 7–25)
CALCIUM SPEC-SCNC: 9.9 MG/DL (ref 8.6–10.5)
CHLORIDE SERPL-SCNC: 103 MMOL/L (ref 98–107)
CO2 SERPL-SCNC: 24.2 MMOL/L (ref 22–29)
CREAT SERPL-MCNC: 0.84 MG/DL (ref 0.76–1.27)
DEPRECATED RDW RBC AUTO: 41.7 FL (ref 37–54)
EOSINOPHIL # BLD AUTO: 0.16 10*3/MM3 (ref 0–0.4)
EOSINOPHIL NFR BLD AUTO: 1.6 % (ref 0.3–6.2)
ERYTHROCYTE [DISTWIDTH] IN BLOOD BY AUTOMATED COUNT: 12.9 % (ref 12.3–15.4)
ETHANOL BLD-MCNC: <10 MG/DL (ref 0–10)
ETHANOL UR QL: <0.01 %
GFR SERPL CREATININE-BSD FRML MDRD: 96 ML/MIN/1.73
GLOBULIN UR ELPH-MCNC: 3.1 GM/DL
GLUCOSE SERPL-MCNC: 92 MG/DL (ref 65–99)
HCT VFR BLD AUTO: 41.6 % (ref 37.5–51)
HGB BLD-MCNC: 14.4 G/DL (ref 13–17.7)
HOLD SPECIMEN: NORMAL
HOLD SPECIMEN: NORMAL
IMM GRANULOCYTES # BLD AUTO: 0.03 10*3/MM3 (ref 0–0.05)
IMM GRANULOCYTES NFR BLD AUTO: 0.3 % (ref 0–0.5)
LYMPHOCYTES # BLD AUTO: 1.26 10*3/MM3 (ref 0.7–3.1)
LYMPHOCYTES NFR BLD AUTO: 12.5 % (ref 19.6–45.3)
MCH RBC QN AUTO: 30.5 PG (ref 26.6–33)
MCHC RBC AUTO-ENTMCNC: 34.6 G/DL (ref 31.5–35.7)
MCV RBC AUTO: 88.1 FL (ref 79–97)
MONOCYTES # BLD AUTO: 0.71 10*3/MM3 (ref 0.1–0.9)
MONOCYTES NFR BLD AUTO: 7.1 % (ref 5–12)
NEUTROPHILS NFR BLD AUTO: 7.84 10*3/MM3 (ref 1.7–7)
NEUTROPHILS NFR BLD AUTO: 77.8 % (ref 42.7–76)
NRBC BLD AUTO-RTO: 0 /100 WBC (ref 0–0.2)
PLATELET # BLD AUTO: 175 10*3/MM3 (ref 140–450)
PMV BLD AUTO: 11.3 FL (ref 6–12)
POTASSIUM SERPL-SCNC: 3.7 MMOL/L (ref 3.5–5.2)
PROT SERPL-MCNC: 7.4 G/DL (ref 6–8.5)
RBC # BLD AUTO: 4.72 10*6/MM3 (ref 4.14–5.8)
SALICYLATES SERPL-MCNC: <0.3 MG/DL
SODIUM SERPL-SCNC: 140 MMOL/L (ref 136–145)
WBC NRBC COR # BLD: 10.07 10*3/MM3 (ref 3.4–10.8)
WHOLE BLOOD HOLD SPECIMEN: NORMAL
WHOLE BLOOD HOLD SPECIMEN: NORMAL

## 2022-02-07 PROCEDURE — 99211 OFF/OP EST MAY X REQ PHY/QHP: CPT

## 2022-02-07 PROCEDURE — 36415 COLL VENOUS BLD VENIPUNCTURE: CPT

## 2022-02-07 PROCEDURE — 80143 DRUG ASSAY ACETAMINOPHEN: CPT

## 2022-02-07 PROCEDURE — 80179 DRUG ASSAY SALICYLATE: CPT

## 2022-02-07 PROCEDURE — 85025 COMPLETE CBC W/AUTO DIFF WBC: CPT

## 2022-02-07 PROCEDURE — 80053 COMPREHEN METABOLIC PANEL: CPT

## 2022-02-07 PROCEDURE — 82077 ASSAY SPEC XCP UR&BREATH IA: CPT

## 2022-02-07 RX ORDER — SODIUM CHLORIDE 0.9 % (FLUSH) 0.9 %
10 SYRINGE (ML) INJECTION AS NEEDED
Status: DISCONTINUED | OUTPATIENT
Start: 2022-02-07 | End: 2022-02-07 | Stop reason: HOSPADM

## 2022-02-17 ENCOUNTER — OFFICE VISIT (OUTPATIENT)
Dept: ORTHOPEDIC SURGERY | Facility: CLINIC | Age: 52
End: 2022-02-17

## 2022-02-17 VITALS — OXYGEN SATURATION: 98 % | HEART RATE: 67 BPM | BODY MASS INDEX: 25.76 KG/M2 | HEIGHT: 68 IN | WEIGHT: 170 LBS

## 2022-02-17 DIAGNOSIS — M89.8X1 PAIN OF RIGHT CLAVICLE: ICD-10-CM

## 2022-02-17 DIAGNOSIS — S42.001A CLOSED NONDISPLACED FRACTURE OF RIGHT CLAVICLE, UNSPECIFIED PART OF CLAVICLE, INITIAL ENCOUNTER: Primary | ICD-10-CM

## 2022-02-17 PROCEDURE — 99203 OFFICE O/P NEW LOW 30 MIN: CPT | Performed by: ORTHOPAEDIC SURGERY

## 2022-02-17 RX ORDER — IBUPROFEN 800 MG/1
800 TABLET ORAL 2 TIMES DAILY WITH MEALS
COMMUNITY
Start: 2021-12-29

## 2022-02-17 RX ORDER — OMEPRAZOLE 40 MG/1
40 CAPSULE, DELAYED RELEASE ORAL DAILY
COMMUNITY
Start: 2021-12-29

## 2022-03-04 ENCOUNTER — OFFICE VISIT (OUTPATIENT)
Dept: ORTHOPEDIC SURGERY | Facility: CLINIC | Age: 52
End: 2022-03-04

## 2022-03-04 VITALS — HEART RATE: 71 BPM | BODY MASS INDEX: 25.76 KG/M2 | WEIGHT: 170 LBS | OXYGEN SATURATION: 100 % | HEIGHT: 68 IN

## 2022-03-04 DIAGNOSIS — R29.898 WEAKNESS OF BOTH LOWER EXTREMITIES: ICD-10-CM

## 2022-03-04 DIAGNOSIS — M89.8X1 PAIN OF RIGHT CLAVICLE: Primary | ICD-10-CM

## 2022-03-04 DIAGNOSIS — S42.001A CLOSED NONDISPLACED FRACTURE OF RIGHT CLAVICLE, UNSPECIFIED PART OF CLAVICLE, INITIAL ENCOUNTER: ICD-10-CM

## 2022-03-04 PROCEDURE — 99213 OFFICE O/P EST LOW 20 MIN: CPT | Performed by: ORTHOPAEDIC SURGERY

## 2022-03-04 NOTE — PROGRESS NOTES
"Chief Complaint  Follow-up of the Right Clavicle     Subjective      Haider Jacobs presents to [facility] for a follow-up of right clavicle. Patient present today in a wheelchair for ambulation assistance due to spinal injury, he usually uses a walker. Patient fell down the steps resulting in a right clavicle fracture. Injury sustained on 2/4/22. He still has a moderate amount of pain in the clavicle. He states pain about the clavicle and posteriorly.     Allergies   Allergen Reactions   • Varenicline Other (See Comments)     dizziness        Social History     Socioeconomic History   • Marital status: Single   Tobacco Use   • Smoking status: Current Every Day Smoker     Packs/day: 1.00   • Smokeless tobacco: Never Used   Vaping Use   • Vaping Use: Never used   Substance and Sexual Activity   • Alcohol use: Not Currently   • Drug use: Not Currently   • Sexual activity: Defer        Review of Systems     Objective   Vital Signs:   Pulse 71   Ht 172.7 cm (68\")   Wt 77.1 kg (170 lb)   SpO2 100%   BMI 25.85 kg/m²       Physical Exam  Constitutional:       Appearance: Normal appearance. Patient is well-developed and normal weight.   HENT:      Head: Normocephalic.      Right Ear: Hearing and external ear normal.      Left Ear: Hearing and external ear normal.      Nose: Nose normal.   Eyes:      Conjunctiva/sclera: Conjunctivae normal.   Cardiovascular:      Rate and Rhythm: Normal rate.   Pulmonary:      Effort: Pulmonary effort is normal.      Breath sounds: No wheezing or rales.   Abdominal:      Palpations: Abdomen is soft.      Tenderness: There is no abdominal tenderness.   Musculoskeletal:      Cervical back: Normal range of motion.   Skin:     Findings: No rash.   Neurological:      Mental Status: Patient is alert and oriented to person, place, and time.   Psychiatric:         Mood and Affect: Mood and affect normal.         Judgment: Judgment normal.       Ortho Exam      RIGHT " SHOULDER: Non-tender clavicle. Full elbow flexion. Full elbow extension with pain in the anterior and posterior shoulder. Sensation grossly intact. Neurovascular intact.  Radial pulse 2+, ulnar pulse 2+. No swelling, skin discoloration or atrophy.       Procedures      Imaging Results (Most Recent)     Procedure Component Value Units Date/Time    XR Clavicle Right [[id number]] Resulted: 03/04/22 1003     Updated: 03/04/22 1003           Result Review :     X-Ray Report:  Right shoulder(s) X-Ray  Indication: Evaluation of right clavicle injury   AP and Lateral view(s)  Findings: Right distal clavicle fracture, remains unchanged from previous films.   Prior studies available for comparison: yes      Assessment and Plan     DX: Right distal clavicle fracture   Bilateral leg weakness     Discussed treatment plans with the patient. Discussed surgical intervention vs conservative management. Patient wishes to proceed with conservative management. Patient has loss strength in his body since being in the wheelchair. He typically uses a walker. A prescription for therapy written to help with his legs and shoulder.     Repeat films next visit     Call or return if worsening symptoms.    Follow Up     4-6 weeks.       Patient was given instructions and counseling regarding his condition or for health maintenance advice. Please see specific information pulled into the AVS if appropriate.     Scribed for Sylvia Carrillo MD by Melissa Giles.  03/04/22   10:08 EST        I have personally performed the services described in this document as scribed by the above individual and it is both accurate and complete. Sylvia Carrillo MD 03/04/22

## 2022-03-30 ENCOUNTER — TREATMENT (OUTPATIENT)
Dept: PHYSICAL THERAPY | Facility: CLINIC | Age: 52
End: 2022-03-30

## 2022-03-30 DIAGNOSIS — R29.898 WEAKNESS OF BOTH LOWER EXTREMITIES: ICD-10-CM

## 2022-03-30 DIAGNOSIS — R26.9 GAIT DISTURBANCE: ICD-10-CM

## 2022-03-30 DIAGNOSIS — M89.8X1 PAIN OF RIGHT CLAVICLE: Primary | ICD-10-CM

## 2022-03-30 DIAGNOSIS — S42.001A CLOSED NONDISPLACED FRACTURE OF RIGHT CLAVICLE, UNSPECIFIED PART OF CLAVICLE, INITIAL ENCOUNTER: ICD-10-CM

## 2022-03-30 PROCEDURE — 97162 PT EVAL MOD COMPLEX 30 MIN: CPT | Performed by: PHYSICAL THERAPIST

## 2022-03-30 NOTE — PROGRESS NOTES
Physical Therapy Initial Evaluation and Plan of Care      Patient: Haider Jacobs   : 1970  Diagnosis/ICD-10 Code:  Pain of right clavicle [M89.8X1]  Referring practitioner: Sylvia Carrillo MD  Date of Initial Visit: 3/30/2022  Today's Date: 3/30/2022  Patient seen for 1 sessions           Subjective Questionnaire: QuickDASH: 46=80-99% limitation      Subjective Evaluation    History of Present Illness  Mechanism of injury: Patient is a 51 yr old male referred to physical therapy with diagnosis of right clavicle pain/fracure (nondisplaced) and bilateral leg weakness.  Patient has had spinal cord injury 15 yrs ago and has been using standard walker for gait since then.  Patient fell down stairs in 2022 and fractured right clavicle and was mostly immobile secondary to unable to use right arm for gait.  Patient states now weakness in LE's.  Patient is right hand dominate.    Pain  Current pain ratin  At worst pain ratin  Location: right shoulder/scapula  Quality: sharp and dull ache  Relieving factors: medications, change in position and ice    Patient Goals  Patient goals for therapy: decreased pain, increased motion, increased strength and independence with ADLs/IADLs             Objective          Active Range of Motion     Right Shoulder   Flexion: 96 degrees   Abduction: 85 degrees   Internal rotation BTB: L5     Strength/Myotome Testing     Right Shoulder     Planes of Motion   Flexion: 3   Abduction: 3+   External rotation at 0°: 3-   Internal rotation at 0°: 3     Left Hip   Planes of Motion   Flexion: 3  Extension: 3  Abduction: 3    Right Hip   Planes of Motion   Flexion: 3+  Extension: 3+  Abduction: 3+    Left Knee   Flexion: 3-  Extension: 3-    Right Knee   Flexion: 3  Extension: 3    Left Ankle/Foot   Dorsiflexion: 2+    Right Ankle/Foot   Dorsiflexion: 3    Ambulation     Comments   Patient ambulates with standard walker that is elevated in the front.  Patient ambulates with flexed  posture (hips/knees flexed)     General Comments     Ankle/Foot Comments   Noted bilateral heelcord and hamstring tightness able to PROM to neutral.  Patient also has tight hip flexors/quad bilaterally. Patient unable to lay prone.         Assessment & Plan     Assessment  Impairments: abnormal coordination, abnormal gait, abnormal muscle firing, abnormal muscle tone, abnormal or restricted ROM, activity intolerance, impaired balance, impaired physical strength, lacks appropriate home exercise program, pain with function and safety issue  Functional Limitations: carrying objects, lifting, sleeping, walking, pulling, pushing, uncomfortable because of pain, moving in bed, standing, stooping, reaching behind back, reaching overhead and unable to perform repetitive tasks  Assessment details: Pt presents with limitations, noted by evaluation that impede patient's ability to walk/functional mobility/activity.  The skills of a therapist will be required to safely and effectively implement the following treatment plan to restore maximal level of function.    Prognosis details: Patient demonstrates a significant weaker on left side. Patient has difficulty with use of right hand secondary to prior spinal cord injury.    Goals  Plan Goals: 1. The patient has limited ROM of the right shoulder.    LTG 1: 12 weeks:  The patient will demonstrate 120 degrees of right shoulder flexion, 120 degrees of shoulder abduction, and shoulder internal rotation to L1 to allow the patient to reach into upper kitchen cabinets and manipulate clothing behind the back with greater ease.   STATUS:  New   STG 1a: 6 weeks:  The patient will demonstrate 110 degrees of right shoulder flexion, 100 degrees of shoulder abduction, and shoulder internal rotation to L3 to allow the patient to reach into upper kitchen cabinets and manipulate clothing behind the back with greater ease.   STATUS:  New      2. The patient has limited strength of the right  shoulder.   LTG 2: 12 weeks:  The patient will demonstrate 4/5 strength for right shoulder flexion, abduction, external rotation, and internal rotation in order to demonstrate improved shoulder stability.   STATUS:  New   STG 2a: 6 weeks:  The patient will demonstrate 3/5 strength for right shoulder flexion, abduction, external rotation, and internal rotation.   STATUS:  New   STG2b:  4 weeks:  The patient will be independent with home exercises.    STATUS:  New      3. The patient complains of pain to the right shoulder.   LTG 3: 12 weeks:  The patient will report a pain rating of 3/10 or better in order to improve sleep quality and tolerance to performance of activities of daily living.   STATUS:  New   STG 3a: 6 weeks:  The patient will report a pain rating of 5/10 or better.    STATUS:  New      4. Carrying, Moving, and Handling Objects Functional Limitation     LTG 4: 12 weeks:  The patient will demonstrate 20-39% limitation by achieving a score of 27 on the Quick DASH.   STATUS:  New   STG 4a: 6 weeks:  The patient will demonstrate 40-59% limitation by achieving a score of 36 on the Quick DASH.     STATUS:  New     5. The patient demonstrates weakness of the bilateral hip/knee.   LTG 5: 12weeks:  The patient will demonstrate 4/5 strength for bilateral hip flexion, abduction, and extension in order to improve hip stability.   STATUS:  New   STG 5a: 6weeks:  The patient will demonstrate 4/5 strength for bilateral knee flexion and extension.   STATUS:  New     Plan  Therapy options: will be seen for skilled therapy services  Planned therapy interventions: abdominal trunk stabilization, manual therapy, postural training, neuromuscular re-education, motor coordination training, strengthening, stretching, therapeutic activities, transfer training, home exercise program, gait training, functional ROM exercises, flexibility and balance/weight-bearing training  Frequency: 2x week  Duration in weeks: 12  Treatment plan  discussed with: patient and family      History # of Personal Factors and/or Comorbidities: MODERATE (1-2)  Examination of Body System(s): # of elements: MODERATE (3)  Clinical Presentation: STABLE   Clinical Decision Making: MODERATE      Visit Diagnoses:    ICD-10-CM ICD-9-CM   1. Pain of right clavicle  M89.8X1 733.90   2. Closed nondisplaced fracture of right clavicle, unspecified part of clavicle, initial encounter  S42.001A 810.00   3. Weakness of both lower extremities  R29.898 729.89       Timed:  Manual Therapy:         mins  74523;  Therapeutic Exercise:         mins  88378;     Neuromuscular Karina:        mins  63315;    Therapeutic Activity:          mins  79086;     Gait Training:           mins  58399;     Ultrasound:          mins  02833;    Electrical Stimulation:         mins  09348 ( );    Untimed:  Electrical Stimulation:         mins  16584 ( );  Mechanical Traction:         mins  41284;   PT evaluation   50 mins  18515 (moderate)    Timed Treatment:      mins   Total Treatment:     50   mins    PT SIGNATURE: Kyung Sloan PT     Electronically singed 3/30/2022    [place] PT license: [id number]        Initial Certification  Certification Period: 3/30/2022 thru 6/27/2022  I certify that the therapy services are furnished while this patient is under my care.  The services outlined above are required by this patient, and will be reviewed every 90 days.    PHYSICIAN: Sylvia Carrillo MD  NPI: [id number]                                      DATE:     Please sign and return via fax to [phone]  Thank you, [facility].

## 2022-04-18 ENCOUNTER — TREATMENT (OUTPATIENT)
Dept: PHYSICAL THERAPY | Facility: CLINIC | Age: 52
End: 2022-04-18

## 2022-04-18 DIAGNOSIS — R26.9 GAIT DISTURBANCE: ICD-10-CM

## 2022-04-18 DIAGNOSIS — R29.898 WEAKNESS OF BOTH LOWER EXTREMITIES: ICD-10-CM

## 2022-04-18 DIAGNOSIS — S42.001A CLOSED NONDISPLACED FRACTURE OF RIGHT CLAVICLE, UNSPECIFIED PART OF CLAVICLE, INITIAL ENCOUNTER: ICD-10-CM

## 2022-04-18 DIAGNOSIS — M89.8X1 PAIN OF RIGHT CLAVICLE: Primary | ICD-10-CM

## 2022-04-18 PROCEDURE — 97110 THERAPEUTIC EXERCISES: CPT | Performed by: PHYSICAL THERAPIST

## 2022-04-18 PROCEDURE — 97112 NEUROMUSCULAR REEDUCATION: CPT | Performed by: PHYSICAL THERAPIST

## 2022-04-18 NOTE — PROGRESS NOTES
"Physical Therapy Daily Progress Note        Patient: Haider Jacobs   : 1970  Diagnosis/ICD-10 Code:  Pain of right clavicle [M89.8X1]  Referring practitioner: Sylvia Carrillo MD  Date of Initial Visit: No linked episodes  Today's Date: 2022  Patient seen for Visit count could not be calculated. Make sure you are using a visit which is associated with an episode. sessions             Subjective   Haider Jacobs reports having 6/10 pain in his right shoulder upon arrival.  He does report that - he is used to having pain and has \"More pain in other areas\".    Objective     Active Range of Motion     Right Shoulder   Flexion: 96 degrees   Abduction: 85 degrees   Internal rotation BTB: L5      Strength/Myotome Testing     Right Shoulder      Planes of Motion   Flexion: 3/5   Abduction: 3+/5   External rotation at 0°: 3- /5  Internal rotation at 0°: 3/5     See Exercise and Manual Logs for complete treatment.       Assessment/Plan     Pt tolerated therapy session moderately  well - with initiation of therapeutic exercises, Functional activities, and Manual therapy. Jackie continues to have deficits in Right Shoulder, Trunk, and Bilateral Lower Extremity ROM,  Strength, and Stability; limiting functional  Mobility  and ability to perform ADLs at this time.  He also continues to be at increased risk for falls secondary to significant weakness in bilateral Lower Extremities.      Progress per Plan of Care           Timed:  Manual Therapy:    5     mins  82810;  Therapeutic Exercise:    30     mins  59692;     Neuromuscular Karina:    10    mins  88916;    Therapeutic Activity:     0     mins  05983;     Gait Trainin     mins  72109;     Ultrasound:     0     mins  33443;    Electrical Stimulation:    0     mins  84811;  Iontophoresis     0     mins  19580    Untimed:  Electrical Stimulation:    0     mins  82129 ( );  Mechanical Traction:    0     mins  84361;   Fluidotherapy     0     mins  " 38755  Hot pack     0     mins  82454  Cold pack     0     mins  56596    Timed Treatment:   45   mins   Total Treatment:     45   mins        Catia Walker PTA  Physical Therapist Assistant

## 2022-04-21 ENCOUNTER — TREATMENT (OUTPATIENT)
Dept: PHYSICAL THERAPY | Facility: CLINIC | Age: 52
End: 2022-04-21

## 2022-04-21 DIAGNOSIS — R26.9 GAIT DISTURBANCE: ICD-10-CM

## 2022-04-21 DIAGNOSIS — S42.001A CLOSED NONDISPLACED FRACTURE OF RIGHT CLAVICLE, UNSPECIFIED PART OF CLAVICLE, INITIAL ENCOUNTER: ICD-10-CM

## 2022-04-21 DIAGNOSIS — M89.8X1 PAIN OF RIGHT CLAVICLE: Primary | ICD-10-CM

## 2022-04-21 DIAGNOSIS — R29.898 WEAKNESS OF BOTH LOWER EXTREMITIES: ICD-10-CM

## 2022-04-21 PROCEDURE — 97110 THERAPEUTIC EXERCISES: CPT | Performed by: PHYSICAL THERAPIST

## 2022-04-21 PROCEDURE — 97112 NEUROMUSCULAR REEDUCATION: CPT | Performed by: PHYSICAL THERAPIST

## 2022-04-21 PROCEDURE — 97140 MANUAL THERAPY 1/> REGIONS: CPT | Performed by: PHYSICAL THERAPIST

## 2022-04-21 NOTE — PROGRESS NOTES
"Physical Therapy Daily Progress Note        Patient: Haider Jacobs   : 1970  Diagnosis/ICD-10 Code:  Pain of right clavicle [M89.8X1]  Referring practitioner: Sylvia Carrillo MD  Date of Initial Visit: Type: THERAPY  Noted: 3/30/2022  Today's Date: 2022  Patient seen for 3 sessions             Subjective   Haider Jacobs reports having 6/10 in Right shoulder-clavicle pain upon arrival today.  He reports having slight increase in \"Soreness\" in right shoulder after last therapy session.    Objective     + Tightness Bilateral Hamstrings/Gastroc/Hip Flexors  Hamstring 90/90 flexibility:    Left:  -42 degrees  Right:  -38 degrees    Min/Mod Assist needed for transfer supine to sit on Left    See Exercise and Manual Logs for complete treatment.       Assessment/Plan     Pt tolerated therapy session moderately  well - with progression of therapeutic exercises, Functional activities, and Manual therapy. He  continues to have deficits in Right Shoulder, Trunk, and Bilateral Lower Extremity ROM,  Strength, and Stability; limiting functional  Mobility  and ability to perform ADLs at this time.  He also continues to be at increased risk for falls secondary to significant weakness in bilateral Lower Extremities.  Pt instructed in and issued Written/Verbal HEP this date.       Progress per Plan of Care           Timed:  Manual Therapy:    15     mins  22339;  Therapeutic Exercise:    30     mins  15090;     Neuromuscular Karina:    10    mins  81732;    Therapeutic Activity:     0     mins  65798;     Gait Trainin     mins  21162;     Ultrasound:     0     mins  12099;    Electrical Stimulation:    0     mins  04218;  Iontophoresis     0     mins  97773    Untimed:  Electrical Stimulation:    0     mins  77946 ( );  Mechanical Traction:    0     mins  61124;   Fluidotherapy     0     mins  17155  Hot pack     0     mins  43160  Cold pack     0     mins  76588    Timed Treatment:   55   mins   Total " Treatment:     55   mins        Catia Walker PTA  Physical Therapist Assistant

## 2022-04-25 ENCOUNTER — TREATMENT (OUTPATIENT)
Dept: PHYSICAL THERAPY | Facility: CLINIC | Age: 52
End: 2022-04-25

## 2022-04-25 DIAGNOSIS — M89.8X1 PAIN OF RIGHT CLAVICLE: Primary | ICD-10-CM

## 2022-04-25 DIAGNOSIS — R29.898 WEAKNESS OF BOTH LOWER EXTREMITIES: ICD-10-CM

## 2022-04-25 DIAGNOSIS — S42.001A CLOSED NONDISPLACED FRACTURE OF RIGHT CLAVICLE, UNSPECIFIED PART OF CLAVICLE, INITIAL ENCOUNTER: ICD-10-CM

## 2022-04-25 DIAGNOSIS — R26.9 GAIT DISTURBANCE: ICD-10-CM

## 2022-04-25 PROCEDURE — 97140 MANUAL THERAPY 1/> REGIONS: CPT | Performed by: PHYSICAL THERAPIST

## 2022-04-25 PROCEDURE — 97110 THERAPEUTIC EXERCISES: CPT | Performed by: PHYSICAL THERAPIST

## 2022-04-25 PROCEDURE — 97112 NEUROMUSCULAR REEDUCATION: CPT | Performed by: PHYSICAL THERAPIST

## 2022-04-25 NOTE — PROGRESS NOTES
"Physical Therapy Daily Progress Note        Patient: Haider Jacobs   : 1970  Diagnosis/ICD-10 Code:  Pain of right clavicle [M89.8X1]  Referring practitioner: Sylvia Carrillo MD  Date of Initial Visit: Type: THERAPY  Noted: 3/30/2022  Today's Date: 2022  Patient seen for 4 sessions             Subjective   Haider Jacobs reports having slight increase in pain in right shoulder/clavicle after he reports doing \"More work around the house\" over the weekend.  He rates his right shoulder pain at 6/10 upon arrival.    Objective     + Tightness Bilateral Hamstrings/Gastroc/Hip Flexors  Hamstring 90/90 flexibility:      Min/Mod Assist needed for transfer supine to sit on Left       See Exercise and Manual Logs for complete treatment.       Assessment/Plan     Pt tolerated therapy session moderately  well - with progression of therapeutic exercises, Functional activities, and Manual therapy. He  continues to have deficits in Right Shoulder, Trunk, and Bilateral Lower Extremity ROM,  Strength, and Stability; limiting functional  Mobility  and ability to perform ADLs at this time.  He also continues to be at increased risk for falls secondary to significant weakness in bilateral Lower Extremities.     Plan to trial bilateral Platform walker next couple of visits- in effort to improve mobility and to decrease stress on right shoulder -Upper Extremity.          Progress per Plan of Care           Timed:  Manual Therapy:    10     mins  56719;  Therapeutic Exercise:    30     mins  29439;     Neuromuscular Karina:    8    mins  12950;    Therapeutic Activity:     0     mins  78762;     Gait Trainin     mins  76339;     Ultrasound:     0     mins  85820;    Electrical Stimulation:    0     mins  68882;  Iontophoresis     0     mins  54134    Untimed:  Electrical Stimulation:    0     mins  64016 ( );  Mechanical Traction:    0     mins  10121;   Fluidotherapy     0     mins  09753  Hot pack     0     mins "  50155  Cold pack     0     mins  09760    Timed Treatment:   48   mins   Total Treatment:     48   mins        Catia Walker PTA  Physical Therapist Assistant

## 2022-04-28 ENCOUNTER — TREATMENT (OUTPATIENT)
Dept: PHYSICAL THERAPY | Facility: CLINIC | Age: 52
End: 2022-04-28

## 2022-04-28 DIAGNOSIS — S42.001A CLOSED NONDISPLACED FRACTURE OF RIGHT CLAVICLE, UNSPECIFIED PART OF CLAVICLE, INITIAL ENCOUNTER: ICD-10-CM

## 2022-04-28 DIAGNOSIS — R26.9 GAIT DISTURBANCE: ICD-10-CM

## 2022-04-28 DIAGNOSIS — M89.8X1 PAIN OF RIGHT CLAVICLE: Primary | ICD-10-CM

## 2022-04-28 DIAGNOSIS — R29.898 WEAKNESS OF BOTH LOWER EXTREMITIES: ICD-10-CM

## 2022-04-28 PROCEDURE — 97530 THERAPEUTIC ACTIVITIES: CPT | Performed by: PHYSICAL THERAPIST

## 2022-04-28 PROCEDURE — 97116 GAIT TRAINING THERAPY: CPT | Performed by: PHYSICAL THERAPIST

## 2022-04-28 PROCEDURE — 97110 THERAPEUTIC EXERCISES: CPT | Performed by: PHYSICAL THERAPIST

## 2022-05-02 ENCOUNTER — TREATMENT (OUTPATIENT)
Dept: PHYSICAL THERAPY | Facility: CLINIC | Age: 52
End: 2022-05-02

## 2022-05-02 DIAGNOSIS — R29.898 WEAKNESS OF BOTH LOWER EXTREMITIES: ICD-10-CM

## 2022-05-02 DIAGNOSIS — M89.8X1 PAIN OF RIGHT CLAVICLE: Primary | ICD-10-CM

## 2022-05-02 DIAGNOSIS — R26.9 GAIT DISTURBANCE: ICD-10-CM

## 2022-05-02 DIAGNOSIS — S42.001A CLOSED NONDISPLACED FRACTURE OF RIGHT CLAVICLE, UNSPECIFIED PART OF CLAVICLE, INITIAL ENCOUNTER: ICD-10-CM

## 2022-05-02 PROCEDURE — 97110 THERAPEUTIC EXERCISES: CPT | Performed by: PHYSICAL THERAPIST

## 2022-05-02 PROCEDURE — 97530 THERAPEUTIC ACTIVITIES: CPT | Performed by: PHYSICAL THERAPIST

## 2022-05-02 NOTE — PROGRESS NOTES
Physical Therapy Progress Note      Patient: Haider Jacobs   : 1970  Referring practitioner: Sylvia aCrrillo MD  Date of Initial Visit: Type: THERAPY  Noted: 3/30/2022  Today's Date: 2022  Patient seen for 5 sessions           Subjective Evaluation    Pain  Current pain ratin  Location: Right shoulder         Haider Jacobs reports: getting better/stronger with therapy; still has difficulty transferring supine to sit.    Objective          Active Range of Motion     Right Shoulder   Flexion: 100 degrees   Abduction: 85 degrees   Internal rotation BTB: L4     Strength/Myotome Testing     Right Shoulder     Planes of Motion   Flexion: 3   Abduction: 3+   External rotation at 0°: 3-   Internal rotation at 0°: 3     Left Hip   Planes of Motion   Flexion: 3  Extension: 3  Abduction: 3    Right Hip   Planes of Motion   Flexion: 3+  Extension: 3+  Abduction: 3+    Left Knee   Flexion: 3-  Extension: 3-    Right Knee   Flexion: 3-  Extension: 3    Left Ankle/Foot   Dorsiflexion: 2+    Right Ankle/Foot   Dorsiflexion: 3    Ambulation     Comments   Patient ambulates with standard walker that is elevated in the front.  Patient ambulates with flexed posture (hips/knees flexed)     General Comments     Ankle/Foot Comments   Noted bilateral heelcord and hamstring tightness able to PROM to neutral.  Patient also has tight hip flexors/quad bilaterally. Patient unable to lay prone.     See Exercise, Manual, and Modality Logs for complete treatment.       Assessment & Plan     Assessment  Impairments: abnormal coordination, abnormal gait, abnormal muscle firing, abnormal muscle tone, abnormal or restricted ROM, activity intolerance, impaired balance, impaired physical strength, lacks appropriate home exercise program, pain with function and safety issue  Functional Limitations: carrying objects, lifting, sleeping, walking, pulling, pushing, uncomfortable because of pain, moving in bed, standing, stooping, reaching  behind back, reaching overhead and unable to perform repetitive tasks  Assessment details: Pt presents with limitations, noted by evaluation that impede patient's ability to walk/functional mobility/activity.  The skills of a therapist will be required to safely and effectively implement the following treatment plan to restore maximal level of function.    Prognosis details: Patient demonstrates a significant weaker on left side. Patient has difficulty with use of right hand secondary to prior spinal cord injury.    Goals  Plan Goals: 1. The patient has limited ROM of the right shoulder.    LTG 1: 12 weeks:  The patient will demonstrate 120 degrees of right shoulder flexion, 120 degrees of shoulder abduction, and shoulder internal rotation to L1 to allow the patient to reach into upper kitchen cabinets and manipulate clothing behind the back with greater ease.   STATUS:  ongoing  STG 1a: 6 weeks:  The patient will demonstrate 110 degrees of right shoulder flexion, 100 degrees of shoulder abduction, and shoulder internal rotation to L3 to allow the patient to reach into upper kitchen cabinets and manipulate clothing behind the back with greater ease.   STATUS: ongoing     2. The patient has limited strength of the right shoulder.   LTG 2: 12 weeks:  The patient will demonstrate 4/5 strength for right shoulder flexion, abduction, external rotation, and internal rotation in order to demonstrate improved shoulder stability.   STATUS:  ongoing  STG 2a: 6 weeks:  The patient will demonstrate 3/5 strength for right shoulder flexion, abduction, external rotation, and internal rotation.   STATUS:  New   STG2b:  4 weeks:  The patient will be independent with home exercises.    STATUS: ongoing     3. The patient complains of pain to the right shoulder.   LTG 3: 12 weeks:  The patient will report a pain rating of 3/10 or better in order to improve sleep quality and tolerance to performance of activities of daily living.    STATUS:  Ongoing  STG 3a: 6 weeks:  The patient will report a pain rating of 5/10 or better.    STATUS: Met      4. Carrying, Moving, and Handling Objects Functional Limitation     LTG 4: 12 weeks:  The patient will demonstrate 20-39% limitation by achieving a score of 27 on the Quick DASH.   STATUS:  ongoing  STG 4a: 6 weeks:  The patient will demonstrate 40-59% limitation by achieving a score of 36 on the Quick DASH.     STATUS: ongoing     5. The patient demonstrates weakness of the bilateral hip/knee.   LTG 5: 12weeks:  The patient will demonstrate 4/5 strength for bilateral hip flexion, abduction, and extension in order to improve hip stability.   STATUS: ongoing  STG 5a: 6weeks:  The patient will demonstrate 4/5 strength for bilateral knee flexion and extension.   STATUS: ongoing    Plan  Therapy options: will be seen for skilled therapy services  Planned therapy interventions: abdominal trunk stabilization, manual therapy, postural training, neuromuscular re-education, motor coordination training, strengthening, stretching, therapeutic activities, transfer training, home exercise program, gait training, functional ROM exercises, flexibility and balance/weight-bearing training  Frequency: 2x week  Duration in weeks: 8  Treatment plan discussed with: patient and family        Visit Diagnoses:    ICD-10-CM ICD-9-CM   1. Pain of right clavicle  M89.8X1 733.90   2. Closed nondisplaced fracture of right clavicle, unspecified part of clavicle, initial encounter  S42.001A 810.00   3. Weakness of both lower extremities  R29.898 729.89   4. Gait disturbance  R26.9 781.2       Progress strengthening /stabilization /functional activity           Timed:  Manual Therapy:         mins  17935;  Therapeutic Exercise:    30    mins  12515;     Neuromuscular Karina:        mins  61291;    Therapeutic Activity:     10     mins  73105;     Gait Training:           mins  05630;     Ultrasound:          mins  53733;    Electrical  Stimulation:         mins  70788 ( );    Untimed:  Electrical Stimulation:         mins  09144 ( );  Mechanical Traction:         mins  87132;     Timed Treatment:   40   mins   Total Treatment:     40   mins  Kyung Sloan PT    Electronically singed 5/2/2022      [place] PT license: [id number]  Physical Therapist

## 2022-05-05 ENCOUNTER — TREATMENT (OUTPATIENT)
Dept: PHYSICAL THERAPY | Facility: CLINIC | Age: 52
End: 2022-05-05

## 2022-05-05 DIAGNOSIS — M89.8X1 PAIN OF RIGHT CLAVICLE: Primary | ICD-10-CM

## 2022-05-05 DIAGNOSIS — S42.001A CLOSED NONDISPLACED FRACTURE OF RIGHT CLAVICLE, UNSPECIFIED PART OF CLAVICLE, INITIAL ENCOUNTER: ICD-10-CM

## 2022-05-05 DIAGNOSIS — R26.9 GAIT DISTURBANCE: ICD-10-CM

## 2022-05-05 DIAGNOSIS — R29.898 WEAKNESS OF BOTH LOWER EXTREMITIES: ICD-10-CM

## 2022-05-05 PROCEDURE — 97140 MANUAL THERAPY 1/> REGIONS: CPT | Performed by: PHYSICAL THERAPIST

## 2022-05-05 PROCEDURE — 97110 THERAPEUTIC EXERCISES: CPT | Performed by: PHYSICAL THERAPIST

## 2022-05-05 PROCEDURE — 97530 THERAPEUTIC ACTIVITIES: CPT | Performed by: PHYSICAL THERAPIST

## 2022-05-05 NOTE — PROGRESS NOTES
"Physical Therapy Daily Progress Note        Patient: Haider Jacobs   : 1970  Diagnosis/ICD-10 Code:  Pain of right clavicle [M89.8X1]  Referring practitioner: Syvlia Carrillo MD  Date of Initial Visit: Type: THERAPY  Noted: 3/30/2022  Today's Date: 2022  Patient seen for 7 sessions             Subjective   Haider Jacobs reports having 7-8/10 pain in his left shoulder/neck region.  He states that he feels like he \"Pulled a muscle\" in his neck.  He reports that he feels as if he has gotten stronger in his legs and his right arm since beginning  Therapy.    Objective     Strength/Myotome Testing     Right Shoulder       External rotation at 0°: 3-/5   Internal rotation at 0°: 3/5     Gt Observations:    Trial of ambulation with Right Platform rolling walker 60 ft X2- CGA/SPV.  Pt exhibited improved- More fluid gait pattern with more erect posture and improved Lower Extremity clearance.          See Exercise and  Manual Logs for complete treatment.       Assessment/Plan     Pt tolerated therapy session moderately  well - with progression of therapeutic exercises, Functional activities, and Manual therapy. He  continues to have deficits in Right Shoulder, Trunk, and Bilateral Lower Extremity ROM,  Strength, and Stability; limiting functional  Mobility  and ability to perform ADLs at this time.  He also continues to be at increased risk for falls secondary to significant weakness in bilateral Lower Extremities. Good response to trials of ambulation with Right platform rolling walker today.  Gt improved - as evidenced by more erect posture, improved bilateral Lower extremity clearance, and less stress noted on bilateral Upper Extremities.    Plan to proceed with order request from PCP regarding need for Bilateral Platform Rolling Walker in effort to improve gait and decrease stress on bilateral Upper Extremities.    Progress per Plan of Care           Timed:  Manual Therapy:    8     mins  69596;  Therapeutic " Exercise:    30     mins  35196;     Neuromuscular Karina:    0    mins  80713;    Therapeutic Activity:     10     mins  24910;     Gait Trainin     mins  93492;     Ultrasound:     0     mins  69870;    Electrical Stimulation:    0     mins  34462;  Iontophoresis     0     mins  66466    Untimed:  Electrical Stimulation:    0     mins  72248 ( );  Mechanical Traction:    0     mins  87415;   Fluidotherapy     0     mins  34940  Hot pack     0     mins  23235  Cold pack     0     mins  02925    Timed Treatment:   55   mins   Total Treatment:     55   mins        Catia Walker PTA  Physical Therapist Assistant

## 2022-05-09 ENCOUNTER — TREATMENT (OUTPATIENT)
Dept: PHYSICAL THERAPY | Facility: CLINIC | Age: 52
End: 2022-05-09

## 2022-05-09 DIAGNOSIS — R29.898 WEAKNESS OF BOTH LOWER EXTREMITIES: ICD-10-CM

## 2022-05-09 DIAGNOSIS — R26.9 GAIT DISTURBANCE: ICD-10-CM

## 2022-05-09 DIAGNOSIS — M89.8X1 PAIN OF RIGHT CLAVICLE: Primary | ICD-10-CM

## 2022-05-09 DIAGNOSIS — S42.001A CLOSED NONDISPLACED FRACTURE OF RIGHT CLAVICLE, UNSPECIFIED PART OF CLAVICLE, INITIAL ENCOUNTER: ICD-10-CM

## 2022-05-09 PROCEDURE — 97116 GAIT TRAINING THERAPY: CPT | Performed by: PHYSICAL THERAPIST

## 2022-05-09 PROCEDURE — 97110 THERAPEUTIC EXERCISES: CPT | Performed by: PHYSICAL THERAPIST

## 2022-05-09 PROCEDURE — 97530 THERAPEUTIC ACTIVITIES: CPT | Performed by: PHYSICAL THERAPIST

## 2022-05-09 NOTE — PROGRESS NOTES
Physical Therapy Daily Progress Note        Patient: Haider Jacobs   : 1970  Diagnosis/ICD-10 Code:  Pain of right clavicle [M89.8X1]  Referring practitioner: Sylvia Carrillo MD  Date of Initial Visit: No linked episodes  Today's Date: 2022  Patient seen for Visit count could not be calculated. Make sure you are using a visit which is associated with an episode. sessions             Subjective   Haider Jacobs reports having 5/10 pain in his right shoulder -clavicle upon arrival today.    Objective       Gait Observations:  Pt ambulated 120 ft CGA/SPV with trial of bilateral Platform Rolling Walker with more upright- improved posture / More fluid Gait pattern observed with improved lower Extremity clearance.  Intermittent scissoring of LEs was observed.    See Exercise and Manual Logs for complete treatment.       Assessment/Plan     Pt tolerated therapy session moderately  well - with progression of therapeutic exercises, Functional activities, and Manual therapy. He  continues to have deficits in Right Shoulder, Trunk, and Bilateral Lower Extremity ROM,  Strength, and Stability; limiting functional  Mobility  and ability to perform ADLs at this time.  He also continues to be at increased risk for falls secondary to significant weakness in bilateral Lower Extremities. Good response to trials of ambulation with Right-and Bilateral platform rolling walker.  Gt improved - as evidenced by more erect posture, improved bilateral Lower extremity clearance, and less stress noted on bilateral Upper Extremities.     Plan to proceed with order request from PCP regarding need for Bilateral Platform Rolling Walker in effort to improve gait and decrease stress on bilateral Upper Extremities.       Progress per Plan of Care           Timed:  Manual Therapy:    0     mins  25529;  Therapeutic Exercise:    20     mins  96392;     Neuromuscular Karina:    0    mins  20189;    Therapeutic Activity:     10     mins  41609;      Gait Trainin     mins  31562;     Ultrasound:     0     mins  77077;    Electrical Stimulation:    0     mins  85933;  Iontophoresis     0     mins  93960    Untimed:  Electrical Stimulation:    0     mins  18334 ( );  Mechanical Traction:    0     mins  21080;   Fluidotherapy     0     mins  74344  Hot pack     0     mins  07513  Cold pack     0     mins  62001    Timed Treatment:   38   mins   Total Treatment:     38   mins        Catia Walker PTA  Physical Therapist Assistant

## 2022-05-12 ENCOUNTER — TELEPHONE (OUTPATIENT)
Dept: ORTHOPEDIC SURGERY | Facility: CLINIC | Age: 52
End: 2022-05-12

## 2022-05-12 DIAGNOSIS — R29.898 BILATERAL LEG WEAKNESS: Primary | ICD-10-CM

## 2022-05-16 ENCOUNTER — TREATMENT (OUTPATIENT)
Dept: PHYSICAL THERAPY | Facility: CLINIC | Age: 52
End: 2022-05-16

## 2022-05-16 DIAGNOSIS — M89.8X1 PAIN OF RIGHT CLAVICLE: Primary | ICD-10-CM

## 2022-05-16 DIAGNOSIS — R26.9 GAIT DISTURBANCE: ICD-10-CM

## 2022-05-16 DIAGNOSIS — R29.898 WEAKNESS OF BOTH LOWER EXTREMITIES: ICD-10-CM

## 2022-05-16 DIAGNOSIS — S42.001A CLOSED NONDISPLACED FRACTURE OF RIGHT CLAVICLE, UNSPECIFIED PART OF CLAVICLE, INITIAL ENCOUNTER: ICD-10-CM

## 2022-05-16 PROCEDURE — 97140 MANUAL THERAPY 1/> REGIONS: CPT | Performed by: PHYSICAL THERAPIST

## 2022-05-16 PROCEDURE — 97110 THERAPEUTIC EXERCISES: CPT | Performed by: PHYSICAL THERAPIST

## 2022-05-16 PROCEDURE — 97530 THERAPEUTIC ACTIVITIES: CPT | Performed by: PHYSICAL THERAPIST

## 2022-05-16 NOTE — PROGRESS NOTES
Physical Therapy Daily Progress Note        Patient: Haider Jacobs   : 1970  Diagnosis/ICD-10 Code:  Pain of right clavicle [M89.8X1]  Referring practitioner: Sylvia Carrillo MD  Date of Initial Visit: Type: THERAPY  Noted: 3/30/2022  Today's Date: 2022  Patient seen for 9 sessions             Subjective   Haider Jacobs denies having any pain upon arrival.  He reports that he is feeling stronger and has been having to have less help from his wife with transfers    Objective     Bicep Strength:   Right :Grossly 4+/5  Left:  Grossly 4/5    See Exercise and Manual Logs for complete treatment.       Assessment/Plan     Pt tolerated therapy session  well - with progression of therapeutic exercises, Functional activities, and Manual therapy. He  continues to have deficits in Right Shoulder, Trunk, and Bilateral Lower Extremity ROM,  Strength, and Stability; limiting functional  Mobility and ability to perform ADLs at this time.  He also continues to be at increased risk for falls secondary to  Weakness and decrease motor control of bilateral Lower Extremities. Good response to trials of ambulation with Right-and Bilateral platform rolling walker. Gt improved - as evidenced by more erect posture, improved bilateral Lower extremity clearance, and less stress noted on bilateral Upper Extremities.  Order signed by MD and given to pt to proceed with obtaining bilateral platform front wheeled walker.     Progress per Plan of Care           Timed:  Manual Therapy:    8     mins  10036;  Therapeutic Exercise:    30     mins  42103;     Neuromuscular Karina:    0    mins  23206;    Therapeutic Activity:     10     mins  97715;     Gait Trainin     mins  77574;     Ultrasound:     0     mins  96513;    Electrical Stimulation:    0     mins  01534;  Iontophoresis     0     mins  24184    Untimed:  Electrical Stimulation:    0     mins  84428 ( );  Mechanical Traction:    0     mins  40813;    Fluidotherapy     0     mins  00365  Hot pack     0     mins  39776  Cold pack     0     mins  12135    Timed Treatment:   55   mins   Total Treatment:     55   mins        Catia Walker PTA  Physical Therapist Assistant

## 2022-05-19 ENCOUNTER — TELEPHONE (OUTPATIENT)
Dept: PHYSICAL THERAPY | Facility: OTHER | Age: 52
End: 2022-05-19

## 2022-05-23 ENCOUNTER — TREATMENT (OUTPATIENT)
Dept: PHYSICAL THERAPY | Facility: CLINIC | Age: 52
End: 2022-05-23

## 2022-05-23 DIAGNOSIS — R26.9 GAIT DISTURBANCE: ICD-10-CM

## 2022-05-23 DIAGNOSIS — S42.001A CLOSED NONDISPLACED FRACTURE OF RIGHT CLAVICLE, UNSPECIFIED PART OF CLAVICLE, INITIAL ENCOUNTER: ICD-10-CM

## 2022-05-23 DIAGNOSIS — M89.8X1 PAIN OF RIGHT CLAVICLE: Primary | ICD-10-CM

## 2022-05-23 DIAGNOSIS — R29.898 WEAKNESS OF BOTH LOWER EXTREMITIES: ICD-10-CM

## 2022-05-23 PROCEDURE — 97530 THERAPEUTIC ACTIVITIES: CPT | Performed by: PHYSICAL THERAPIST

## 2022-05-23 PROCEDURE — 97116 GAIT TRAINING THERAPY: CPT | Performed by: PHYSICAL THERAPIST

## 2022-05-23 PROCEDURE — 97110 THERAPEUTIC EXERCISES: CPT | Performed by: PHYSICAL THERAPIST

## 2022-05-23 NOTE — PROGRESS NOTES
Physical Therapy Daily Treatment Note      Patient: Haider Jacobs   : 1970  Referring practitioner: Sylvia Carrillo MD  Date of Initial Visit: Type: THERAPY  Noted: 3/30/2022  Today's Date: 2022  Patient seen for 10 sessions           Subjective   Haider Jacobs reports: no new c/o.       Objective   See Exercise, Manual, and Modality Logs for complete treatment.       Assessment & Plan     Assessment  Prognosis details: Patient demonstrates improve strengthening, but still needing assist for L UE/LE placement with functional activity secondary to spinal cord injury.        Visit Diagnoses:    ICD-10-CM ICD-9-CM   1. Pain of right clavicle  M89.8X1 733.90   2. Closed nondisplaced fracture of right clavicle, unspecified part of clavicle, initial encounter  S42.001A 810.00   3. Weakness of both lower extremities  R29.898 729.89   4. Gait disturbance  R26.9 781.2       Progress strengthening /stabilization /functional activity           Timed:  Manual Therapy:         mins  27303;  Therapeutic Exercise:    40     mins  26649;     Neuromuscular Karina:        mins  31907;    Therapeutic Activity:      10    mins  10849;     Gait Trainin    mins  18789;     Ultrasound:          mins  48305;    Electrical Stimulation:         mins  64941 ( );    Untimed:  Electrical Stimulation:         mins  37324 ( );  Mechanical Traction:         mins  25308;     Timed Treatment:   58   mins   Total Treatment:     58   mins  Kyung Sloan PT    Electronically singed 2022      [place] PT license: [id number]  Physical Therapist

## 2022-05-26 ENCOUNTER — TELEPHONE (OUTPATIENT)
Dept: PHYSICAL THERAPY | Facility: CLINIC | Age: 52
End: 2022-05-26

## 2022-05-31 ENCOUNTER — TELEPHONE (OUTPATIENT)
Dept: PHYSICAL THERAPY | Facility: CLINIC | Age: 52
End: 2022-05-31

## 2022-06-10 ENCOUNTER — TREATMENT (OUTPATIENT)
Dept: PHYSICAL THERAPY | Facility: CLINIC | Age: 52
End: 2022-06-10

## 2022-06-10 DIAGNOSIS — R29.898 WEAKNESS OF BOTH LOWER EXTREMITIES: ICD-10-CM

## 2022-06-10 DIAGNOSIS — M89.8X1 PAIN OF RIGHT CLAVICLE: Primary | ICD-10-CM

## 2022-06-10 DIAGNOSIS — R26.9 GAIT DISTURBANCE: ICD-10-CM

## 2022-06-10 DIAGNOSIS — S42.001A CLOSED NONDISPLACED FRACTURE OF RIGHT CLAVICLE, UNSPECIFIED PART OF CLAVICLE, INITIAL ENCOUNTER: ICD-10-CM

## 2022-06-10 PROCEDURE — 97116 GAIT TRAINING THERAPY: CPT | Performed by: PHYSICAL THERAPIST

## 2022-06-10 PROCEDURE — 97110 THERAPEUTIC EXERCISES: CPT | Performed by: PHYSICAL THERAPIST

## 2022-06-10 NOTE — PROGRESS NOTES
Physical Therapy Progress Note      Patient: Haider Jacobs   : 1970  Referring practitioner: Sylvia Carrillo MD  Date of Initial Visit: Type: THERAPY  Noted: 3/30/2022  Today's Date: 6/10/2022  Patient seen for 11 sessions           Subjective   Haider Jacobs reports: doing better with therapy and would like to continue  Subjective Questionnaire: QuickDASH: 40=60-79% limitation      Objective          Active Range of Motion     Right Shoulder   Flexion: 125 degrees   Abduction: 120 degrees   Internal rotation BTB: L1     Strength/Myotome Testing     Right Shoulder     Planes of Motion   Flexion: 3+   Abduction: 3+   External rotation at 0°: 3-   Internal rotation at 0°: 3+     Left Hip   Planes of Motion   Flexion: 3+  Extension: 3+  Abduction: 3+    Right Hip   Planes of Motion   Flexion: 4-  Extension: 4-  Abduction: 4-    Left Knee   Flexion: 3-  Extension: 3-    Right Knee   Flexion: 3  Extension: 3    Left Ankle/Foot   Dorsiflexion: 2+    Right Ankle/Foot   Dorsiflexion: 3    Ambulation     Comments   Patient ambulates with standard walker that is elevated in the front.  Patient ambulates with flexed posture (hips/knees flexed)      See Exercise, Manual, and Modality Logs for complete treatment.       Assessment & Plan     Assessment  Impairments: abnormal coordination, abnormal gait, abnormal muscle firing, abnormal muscle tone, abnormal or restricted ROM, activity intolerance, impaired balance, impaired physical strength, lacks appropriate home exercise program, pain with function and safety issue  Functional Limitations: carrying objects, lifting, sleeping, walking, pulling, pushing, uncomfortable because of pain, moving in bed, standing, stooping, reaching behind back, reaching overhead and unable to perform repetitive tasks  Assessment details: Patient demonstrates a significant weaker on left side. Patient has difficulty with use of right hand secondary to prior spinal cord injury.    Prognosis  details: Patient has demonstrated significant improvement in right shoulder range of motion and some gains in right shoulder and bilateral LE strength.  Patient would benefit from continued physical therapy for improved functional mobility/independence.     Goals  Plan Goals: 1. The patient has limited ROM of the right shoulder.    LTG 1: 12 weeks:  The patient will demonstrate 120 degrees of right shoulder flexion, 120 degrees of shoulder abduction, and shoulder internal rotation to L1 to allow the patient to reach into upper kitchen cabinets and manipulate clothing behind the back with greater ease.   STATUS:  Met  STG 1a: 6 weeks:  The patient will demonstrate 110 degrees of right shoulder flexion, 100 degrees of shoulder abduction, and shoulder internal rotation to L3 to allow the patient to reach into upper kitchen cabinets and manipulate clothing behind the back with greater ease.   STATUS: Met     2. The patient has limited strength of the right shoulder.   LTG 2: 12 weeks:  The patient will demonstrate 4/5 strength for right shoulder flexion, abduction, external rotation, and internal rotation in order to demonstrate improved shoulder stability.   STATUS:  ongoing  STG 2a: 6 weeks:  The patient will demonstrate 3/5 strength for right shoulder flexion, abduction, external rotation, and internal rotation.   STATUS:  Met except external rotation  STG2b:  4 weeks:  The patient will be independent with home exercises.    STATUS: ongoing     3. The patient complains of pain to the right shoulder.   LTG 3: 12 weeks:  The patient will report a pain rating of 3/10 or better in order to improve sleep quality and tolerance to performance of activities of daily living.   STATUS:  Ongoing  STG 3a: 6 weeks:  The patient will report a pain rating of 5/10 or better.    STATUS: Met      4. Carrying, Moving, and Handling Objects Functional Limitation     LTG 4: 12 weeks:  The patient will demonstrate 20-39% limitation by  achieving a score of 27 on the Quick DASH.   STATUS:  ongoing  STG 4a: 6 weeks:  The patient will demonstrate 40-59% limitation by achieving a score of 36 on the Quick DASH.     STATUS:Progressing    5. The patient demonstrates weakness of the bilateral hip/knee.   LTG 5: 12weeks:  The patient will demonstrate 4/5 strength for bilateral hip flexion, abduction, and extension in order to improve hip stability.   STATUS: ongoing  STG 5a: 6weeks:  The patient will demonstrate 4/5 strength for bilateral knee flexion and extension.   STATUS: ongoing    Plan  Therapy options: will be seen for skilled therapy services  Planned therapy interventions: abdominal trunk stabilization, manual therapy, postural training, neuromuscular re-education, motor coordination training, strengthening, stretching, therapeutic activities, transfer training, home exercise program, gait training, functional ROM exercises, flexibility and balance/weight-bearing training  Frequency: 2x week  Duration in weeks: 8  Treatment plan discussed with: patient and family        Visit Diagnoses:    ICD-10-CM ICD-9-CM   1. Pain of right clavicle  M89.8X1 733.90   2. Closed nondisplaced fracture of right clavicle, unspecified part of clavicle, initial encounter  S42.001A 810.00   3. Weakness of both lower extremities  R29.898 729.89   4. Gait disturbance  R26.9 781.2       Progress per Plan of Care           Timed:  Manual Therapy:         mins  16512;  Therapeutic Exercise:    30     mins  58744;     Neuromuscular Karina:        mins  79222;    Therapeutic Activity:          mins  55402;     Gait Training:      10     mins  79475;     Ultrasound:          mins  83590;    Electrical Stimulation:         mins  17320 ( );    Untimed:  Electrical Stimulation:         mins  24267 ( );  Mechanical Traction:         mins  58507;     Timed Treatment:   40   mins   Total Treatment:     40   mins  Kyung Sloan PT    Electronically singed  6/10/2022      [place] PT license: [id number]  Physical Therapist

## 2022-07-05 ENCOUNTER — TREATMENT (OUTPATIENT)
Dept: PHYSICAL THERAPY | Facility: CLINIC | Age: 52
End: 2022-07-05

## 2022-07-05 DIAGNOSIS — R26.9 GAIT DISTURBANCE: ICD-10-CM

## 2022-07-05 DIAGNOSIS — R29.898 WEAKNESS OF BOTH LOWER EXTREMITIES: ICD-10-CM

## 2022-07-05 DIAGNOSIS — M89.8X1 PAIN OF RIGHT CLAVICLE: ICD-10-CM

## 2022-07-05 DIAGNOSIS — S42.001A CLOSED NONDISPLACED FRACTURE OF RIGHT CLAVICLE, UNSPECIFIED PART OF CLAVICLE, INITIAL ENCOUNTER: Primary | ICD-10-CM

## 2022-07-05 PROCEDURE — 97116 GAIT TRAINING THERAPY: CPT | Performed by: PHYSICAL THERAPIST

## 2022-07-05 PROCEDURE — 97110 THERAPEUTIC EXERCISES: CPT | Performed by: PHYSICAL THERAPIST

## 2022-07-05 NOTE — PROGRESS NOTES
Re-Assessment / Re-Certification      Patient: Haider Jacobs   : 1970  Diagnosis/ICD-10 Code:  Closed nondisplaced fracture of right clavicle, unspecified part of clavicle, initial encounter [S42.001A]  Referring practitioner: Sylvia Carrillo MD  Date of Initial Visit: Type: THERAPY  Noted: 3/30/2022  Today's Date: 2022  Patient seen for 12 sessions      Subjective:   Haider Jacobs reports: stiff today secondary to has not been able to attend therapy since 6/10/22.  Subjective Questionnaire: QuickDASH: 40=60-79% limitation  Clinical Progress: improved  Home Program Compliance: Yes  Treatment has included: therapeutic exercise, neuromuscular re-education, manual therapy, therapeutic activity and gait training    Subjective   Objective          Active Range of Motion     Right Shoulder   Flexion: 125 degrees   Abduction: 120 degrees   Internal rotation BTB: L1     Strength/Myotome Testing     Right Shoulder     Planes of Motion   Flexion: 3+   Abduction: 3+   External rotation at 0°: 3-   Internal rotation at 0°: 3+     Left Hip   Planes of Motion   Flexion: 3+  Extension: 3+  Abduction: 3+    Right Hip   Planes of Motion   Flexion: 4-  Extension: 4-  Abduction: 4-    Left Knee   Flexion: 3-  Extension: 3-    Right Knee   Flexion: 3  Extension: 3    Left Ankle/Foot   Dorsiflexion: 2+    Right Ankle/Foot   Dorsiflexion: 3    Ambulation     Comments   Patient ambulates with standard walker that is elevated in the front.  Patient ambulates with flexed posture (hips/knees flexed)      Assessment & Plan     Assessment  Impairments: abnormal coordination, abnormal gait, abnormal muscle firing, abnormal muscle tone, abnormal or restricted ROM, activity intolerance, impaired balance, impaired physical strength, lacks appropriate home exercise program, pain with function and safety issue  Functional Limitations: carrying objects, lifting, sleeping, walking, pulling, pushing, uncomfortable because of pain, moving in  bed, standing, stooping, reaching behind back, reaching overhead and unable to perform repetitive tasks  Assessment details: Patient demonstrates a significant weaker on left side. Patient has difficulty with use of right hand secondary to prior spinal cord injury.    Prognosis details: Patient has demonstrated significant improvement in right shoulder range of motion and some gains in right shoulder and bilateral LE strength.  Patient would benefit from continued physical therapy for improved functional mobility/independence.     Goals  Plan Goals: 1. The patient has limited ROM of the right shoulder.    LTG 1: 12 weeks:  The patient will demonstrate 120 degrees of right shoulder flexion, 120 degrees of shoulder abduction, and shoulder internal rotation to L1 to allow the patient to reach into upper kitchen cabinets and manipulate clothing behind the back with greater ease.   STATUS:  Met  STG 1a: 6 weeks:  The patient will demonstrate 110 degrees of right shoulder flexion, 100 degrees of shoulder abduction, and shoulder internal rotation to L3 to allow the patient to reach into upper kitchen cabinets and manipulate clothing behind the back with greater ease.   STATUS: Met     2. The patient has limited strength of the right shoulder.   LTG 2: 12 weeks:  The patient will demonstrate 4/5 strength for right shoulder flexion, abduction, external rotation, and internal rotation in order to demonstrate improved shoulder stability.   STATUS:  ongoing  STG 2a: 6 weeks:  The patient will demonstrate 3/5 strength for right shoulder flexion, abduction, external rotation, and internal rotation.   STATUS:  Met except external rotation  STG2b:  4 weeks:  The patient will be independent with home exercises.    STATUS: ongoing     3. The patient complains of pain to the right shoulder.   LTG 3: 12 weeks:  The patient will report a pain rating of 3/10 or better in order to improve sleep quality and tolerance to performance of  activities of daily living.   STATUS:  Ongoing  STG 3a: 6 weeks:  The patient will report a pain rating of 5/10 or better.    STATUS: Met      4. Carrying, Moving, and Handling Objects Functional Limitation     LTG 4: 12 weeks:  The patient will demonstrate 20-39% limitation by achieving a score of 27 on the Quick DASH.   STATUS:  ongoing  STG 4a: 6 weeks:  The patient will demonstrate 40-59% limitation by achieving a score of 36 on the Quick DASH.     STATUS:Progressing    5. The patient demonstrates weakness of the bilateral hip/knee.   LTG 5: 12weeks:  The patient will demonstrate 4/5 strength for bilateral hip flexion, abduction, and extension in order to improve hip stability.   STATUS: ongoing  STG 5a: 6weeks:  The patient will demonstrate 4/5 strength for bilateral knee flexion and extension.   STATUS: ongoing    Plan  Therapy options: will be seen for skilled therapy services  Planned therapy interventions: abdominal trunk stabilization, manual therapy, postural training, neuromuscular re-education, motor coordination training, strengthening, stretching, therapeutic activities, transfer training, home exercise program, gait training, functional ROM exercises, flexibility and balance/weight-bearing training  Frequency: 2x week  Duration in weeks: 8  Treatment plan discussed with: patient and family        Visit Diagnoses:    ICD-10-CM ICD-9-CM   1. Closed nondisplaced fracture of right clavicle, unspecified part of clavicle, initial encounter  S42.001A 810.00   2. Weakness of both lower extremities  R29.898 729.89   3. Gait disturbance  R26.9 781.2   4. Pain of right clavicle  M89.8X1 733.90       Progress toward previous goals: Partially Met      Recommendations: Continue as planned  Timeframe: 2 months  Prognosis to achieve goals: good    PT Signature: Kyung Sloan, PT    Electronically singed 7/5/2022    [place] PT license: [id number]        90 Day Recertification  Certification Period: 7/5/2022 thru  10/2/2022  I certify that the therapy services are furnished while this patient is under my care.  The services outlined above are required by this patient, and will be reviewed every 90 days.    PHYSICIAN: Sylvia Carrillo MD  NPI: [id number]                                      DATE:     Based upon review of the patient's progress and continued therapy plan, it is my medical opinion that Haider Jacobs should continue physical therapy treatment at [facility]  [address] [place] [id number] [phone].    Signature: __________________________________  Sylvia Carrillo MD    Timed:  Manual Therapy:         mins  45592;  Therapeutic Exercise:    30     mins  82675;     Neuromuscular Karina:        mins  90244;    Therapeutic Activity:          mins  39821;     Gait Trainin     mins  94957;     Ultrasound:          mins  68688;    Electrical Stimulation:         mins  40013 ( );    Untimed:  Electrical Stimulation:         mins  55784 ( );  Mechanical Traction:         mins  75680;     Timed Treatment:   42   mins   Total Treatment:     42   mins

## 2022-07-07 ENCOUNTER — TREATMENT (OUTPATIENT)
Dept: PHYSICAL THERAPY | Facility: CLINIC | Age: 52
End: 2022-07-07

## 2022-07-07 DIAGNOSIS — R29.898 WEAKNESS OF BOTH LOWER EXTREMITIES: ICD-10-CM

## 2022-07-07 DIAGNOSIS — M89.8X1 PAIN OF RIGHT CLAVICLE: ICD-10-CM

## 2022-07-07 DIAGNOSIS — R26.9 GAIT DISTURBANCE: ICD-10-CM

## 2022-07-07 DIAGNOSIS — S42.001A CLOSED NONDISPLACED FRACTURE OF RIGHT CLAVICLE, UNSPECIFIED PART OF CLAVICLE, INITIAL ENCOUNTER: Primary | ICD-10-CM

## 2022-07-07 PROCEDURE — 97110 THERAPEUTIC EXERCISES: CPT | Performed by: PHYSICAL THERAPIST

## 2022-07-07 PROCEDURE — 97530 THERAPEUTIC ACTIVITIES: CPT | Performed by: PHYSICAL THERAPIST

## 2022-07-07 PROCEDURE — 97116 GAIT TRAINING THERAPY: CPT | Performed by: PHYSICAL THERAPIST

## 2022-07-07 NOTE — PROGRESS NOTES
"Physical Therapy Daily Treatment Note        Patient: Haider Jacobs   : 1970  Diagnosis/ICD-10 Code:  Closed nondisplaced fracture of right clavicle, unspecified part of clavicle, initial encounter [S42.001A]  Referring practitioner: Sylvia Carrillo MD  Date of Initial Visit: Type: THERAPY  Noted: 3/30/2022  Today's Date: 2022  Patient seen for 13 sessions             Subjective     Haider Jacobs reports having only mild \"Soreness\" in his right shoulder.  He also reports having a lot of soreness after returning to therapy this past week.    Objective          Strength/Myotome Testing     Right Shoulder      Planes of Motion   Flexion: 3+/5   Abduction: 3+/5   External rotation at 0°: 3-/5   Internal rotation at 0°: 3+/5     See Exercise Logs for complete treatment.       Assessment/Plan     Pt tolerated therapy session  well - with progression of therapeutic exercises, Functional activities, and Manual therapy. He has improved, but  continues to have deficits in Right Shoulder, Trunk, and Bilateral Lower Extremity ROM,  Strength, and Stability; limiting functional  Mobility and ability to perform ADLs at this time.  He also continues to be at increased risk for falls secondary to  Weakness and decrease motor control of bilateral Lower Extremities. Good response to trials of ambulation with Right-and Bilateral platform rolling walker. Gt improved - as evidenced by more erect posture, improved bilateral Lower extremity clearance, and less stress noted on bilateral Upper Extremities.    Pt given 2nd copy of Order signed by MD to proceed with obtaining bilateral platform front wheeled walker.       Progress per Plan of Care           Timed:  Manual Therapy:    0     mins  51243;  Therapeutic Exercise:    30     mins  27228;     Neuromuscular Karina:    0    mins  11813;    Therapeutic Activity:     13     mins  61896;     Gait Training:      10     mins  00560;     Ultrasound:     0     mins  49963;  "   Electrical Stimulation:    0     mins  74782;  Iontophoresis     0     mins  83231    Untimed:  Electrical Stimulation:    0     mins  06387 ( );  Mechanical Traction:    0     mins  09841;   Fluidotherapy     0     mins  80034  Hot pack     0     mins  35496  Cold pack     0     mins  45762    Timed Treatment:   53   mins   Total Treatment:     53   mins        Catia Walker PTA  Physical Therapist Assistant

## 2022-07-13 ENCOUNTER — TREATMENT (OUTPATIENT)
Dept: PHYSICAL THERAPY | Facility: CLINIC | Age: 52
End: 2022-07-13

## 2022-07-13 DIAGNOSIS — R29.898 WEAKNESS OF BOTH LOWER EXTREMITIES: ICD-10-CM

## 2022-07-13 DIAGNOSIS — R26.9 GAIT DISTURBANCE: ICD-10-CM

## 2022-07-13 DIAGNOSIS — M89.8X1 PAIN OF RIGHT CLAVICLE: ICD-10-CM

## 2022-07-13 DIAGNOSIS — S42.001A CLOSED NONDISPLACED FRACTURE OF RIGHT CLAVICLE, UNSPECIFIED PART OF CLAVICLE, INITIAL ENCOUNTER: Primary | ICD-10-CM

## 2022-07-13 PROCEDURE — 97110 THERAPEUTIC EXERCISES: CPT | Performed by: PHYSICAL THERAPIST

## 2022-07-13 PROCEDURE — 97116 GAIT TRAINING THERAPY: CPT | Performed by: PHYSICAL THERAPIST

## 2022-07-13 PROCEDURE — 97530 THERAPEUTIC ACTIVITIES: CPT | Performed by: PHYSICAL THERAPIST

## 2022-07-13 NOTE — PROGRESS NOTES
Physical Therapy Daily Treatment Note        Patient: Haider Jacobs   : 1970  Diagnosis/ICD-10 Code:  Closed nondisplaced fracture of right clavicle, unspecified part of clavicle, initial encounter [S42.001A]  Referring practitioner: Sylvia Carrillo MD  Date of Initial Visit: Type: THERAPY  Noted: 3/30/2022  Today's Date: 2022  Patient seen for 14 sessions             Subjective   Haider Jacobs voiced no new complaints.      Objective     Gt Observations:    Right Platform rolling walker 250 ft X2- CGA/SPV.  Pt exhibited improved- More fluid gait pattern with more erect posture and improved Lower Extremity clearance.  Pt exhibits Crouched gait posture with no heel Strike or push off bilaterally.   Trial today of Ace wrap to left ankle for Dorsiflexion assist to improve clearance and toe drag during ambulation.    See Exercise Logs for complete treatment.       Assessment/Plan     Pt tolerated therapy session  well - with progression of therapeutic exercises, Functional activities, and Manual therapy. He has improved, but  continues to have deficits in Right Shoulder, Trunk, and Bilateral Lower Extremity ROM,  Strength, and Stability; limiting functional  Mobility and ability to perform ADLs at this time. He also continues to be at increased risk for falls secondary to Weakness and decrease motor control of bilateral Lower Extremities. Trial today of Ace wrap on left ankle for Dorsiflex assist to decrease toe drag during ambulation.       Progress per Plan of Care           Timed:  Manual Therapy:    0     mins  72307;  Therapeutic Exercise:    30     mins  15787;     Neuromuscular Karina:    0    mins  40807;    Therapeutic Activity:     13     mins  59702;     Gait Training:      10     mins  24946;     Ultrasound:     0     mins  03164;    Electrical Stimulation:    0     mins  67544;  Iontophoresis     0     mins  22040    Untimed:  Electrical Stimulation:    0     mins  55787 ( );  Mechanical  Traction:    0     mins  31834;   Fluidotherapy     0     mins  89526  Hot pack     0     mins  26825  Cold pack     0     mins  47898    Timed Treatment:   53   mins   Total Treatment:     53   mins        Catia Walker PTA  Physical Therapist Assistant

## 2022-07-20 ENCOUNTER — TELEPHONE (OUTPATIENT)
Dept: PHYSICAL THERAPY | Facility: OTHER | Age: 52
End: 2022-07-20

## 2022-07-27 ENCOUNTER — TREATMENT (OUTPATIENT)
Dept: PHYSICAL THERAPY | Facility: CLINIC | Age: 52
End: 2022-07-27

## 2022-07-27 DIAGNOSIS — R26.9 GAIT DISTURBANCE: ICD-10-CM

## 2022-07-27 DIAGNOSIS — M89.8X1 PAIN OF RIGHT CLAVICLE: ICD-10-CM

## 2022-07-27 DIAGNOSIS — S42.001A CLOSED NONDISPLACED FRACTURE OF RIGHT CLAVICLE, UNSPECIFIED PART OF CLAVICLE, INITIAL ENCOUNTER: Primary | ICD-10-CM

## 2022-07-27 DIAGNOSIS — R29.898 WEAKNESS OF BOTH LOWER EXTREMITIES: ICD-10-CM

## 2022-07-27 PROCEDURE — 97530 THERAPEUTIC ACTIVITIES: CPT | Performed by: PHYSICAL THERAPIST

## 2022-07-27 PROCEDURE — 97140 MANUAL THERAPY 1/> REGIONS: CPT | Performed by: PHYSICAL THERAPIST

## 2022-07-27 PROCEDURE — 97110 THERAPEUTIC EXERCISES: CPT | Performed by: PHYSICAL THERAPIST

## 2022-07-27 NOTE — PROGRESS NOTES
Physical Therapy Daily Treatment Note        Patient: Haider Jacobs   : 1970  Diagnosis/ICD-10 Code:  Closed nondisplaced fracture of right clavicle, unspecified part of clavicle, initial encounter [S42.001A]  Referring practitioner: Sylvia Carrillo MD  Date of Initial Visit: Type: THERAPY  Noted: 3/30/2022  Today's Date: 2022  Patient seen for 15 sessions             Subjective   Haider Jacobs reports having increased pain and stiffness after having to miss recent therapy sessions.  He rates his right shoulder/clavical pain at 6/10 upon arrival today.  He states he was unable to make recent appointments because his wife/caregiver was out of town for work.     Objective     Active Range of Motion     Right Shoulder   Flexion: 130 degrees   Abduction: 90 degrees  Extension:  50 degrees  External Rotation:  45 degrees   Internal rotation BTB: L1     Gt Observations:    Right Platform rolling walker 250 ft X2- CGA/SPV.  Pt exhibited improved- More fluid gait pattern with more erect posture and improved Lower Extremity clearance.  Pt exhibits Crouched gait posture with no heel Strike or push off bilaterally.   He continues to lack functional ankle Dorsiflexion Bilaterally - which tends to allow foot to drop and toes to drag during ambulation.         See Exercise and Manual Logs for complete treatment.       Assessment/Plan     Pt tolerated therapy session  well - with progression of therapeutic exercises, Functional activities, and Manual therapy. He has improved, but  continues to have deficits in Right Shoulder, Trunk, and Bilateral Lower Extremity ROM,  Strength, and Stability; limiting functional  Mobility and ability to perform ADLs at this time. He also continues to be at increased risk for falls secondary to Weakness and decrease motor control of bilateral Lower Extremities.  Continue to feel pt would benefit from obtaining Orthotic consult for possible Bilateral KAFOs/AFOs and Bilateral Platform  walker for energy conservation and to decrease risk for falls.  Pt states that he plans to contact DME providers to check on his options.  Pt was given copy or order for Platform walker.    Progress per Plan of Care           Timed:  Manual Therapy:    8     mins  98564;  Therapeutic Exercise:    30     mins  53394;     Neuromuscular Karina:    0    mins  26772;    Therapeutic Activity:     12     mins  43713;     Gait Trainin     mins  71638;     Ultrasound:     0     mins  73754;    Electrical Stimulation:    0     mins  67642;  Iontophoresis     0     mins  68323    Untimed:  Electrical Stimulation:    0     mins  93805 ( );  Mechanical Traction:    0     mins  89203;   Fluidotherapy     0     mins  03219  Hot pack     0     mins  59192  Cold pack     0     mins  21274    Timed Treatment:   56   mins   Total Treatment:     56   mins        Catia Walker PTA  Physical Therapist Assistant

## 2022-08-08 ENCOUNTER — TREATMENT (OUTPATIENT)
Dept: PHYSICAL THERAPY | Facility: CLINIC | Age: 52
End: 2022-08-08

## 2022-08-08 DIAGNOSIS — R26.9 GAIT DISTURBANCE: ICD-10-CM

## 2022-08-08 DIAGNOSIS — S42.001A CLOSED NONDISPLACED FRACTURE OF RIGHT CLAVICLE, UNSPECIFIED PART OF CLAVICLE, INITIAL ENCOUNTER: Primary | ICD-10-CM

## 2022-08-08 DIAGNOSIS — R29.898 WEAKNESS OF BOTH LOWER EXTREMITIES: ICD-10-CM

## 2022-08-08 DIAGNOSIS — M89.8X1 PAIN OF RIGHT CLAVICLE: ICD-10-CM

## 2022-08-08 PROCEDURE — 97116 GAIT TRAINING THERAPY: CPT | Performed by: PHYSICAL THERAPIST

## 2022-08-08 PROCEDURE — 97530 THERAPEUTIC ACTIVITIES: CPT | Performed by: PHYSICAL THERAPIST

## 2022-08-08 PROCEDURE — 97110 THERAPEUTIC EXERCISES: CPT | Performed by: PHYSICAL THERAPIST

## 2022-08-08 NOTE — PROGRESS NOTES
Progress note    Patient: Haider Jacobs   : 1970  Diagnosis/ICD-10 Code:  Closed nondisplaced fracture of right clavicle, unspecified part of clavicle, initial encounter [S42.001A]  Referring practitioner: Sylvia Carrillo MD  Date of Initial Visit: Type: THERAPY  Noted: 3/30/2022  Today's Date: 2022  Patient seen for 16 sessions    Progress note due: 2022  Re-cert due: 2022      Subjective:   Haider Jacobs reports: moderate R shoulder pain at beginning of session today.  Pt reports that has noticed some improvements in his ability to transfer and R shoulder strength/L LE strength over the past month.  Pt still believes that he is lacking 50% of his bilateral LE strength compared to prior to his injury.  Pt reports that he still requires assistance from his wife for supine to/from sit transfers due to weakness.  Pt reports that he has not followed up on obtaining a platform walker or AFO for L foot.  PT educated pt again this session on importance of getting proper AD and AFO to improve gait mechanics, decrease compensation strategies, decreased falls risk and decrease bilateral UE injury.  Pt reports understanding and states that he will discuss with his wife.  Subjective Questionnaire: QuickDASH: 40=60-79% limitation      Subjective   Objective     Active Range of Motion     Right Shoulder   Flexion: 129 degrees   Abduction: 90 degrees   Internal rotation BTB: L1   External rotation: 31     Strength/Myotome Testing     Right Shoulder      Planes of Motion   Flexion: 3+   Abduction: 3-   Extension: 4-  External rotation at 0°: 3+   Internal rotation at 0°: 4-      Left Hip   Planes of Motion   Flexion: 3+  Extension: 3+  Abduction: 3+     Right Hip   Planes of Motion   Flexion: 4-  Extension: 4-  Abduction: 4-     Left Knee   Flexion: 3-  Extension: 3-     Right Knee   Flexion: 3+  Extension: 3+     Left Ankle/Foot   Dorsiflexion: 2+     Right Ankle/Foot   Dorsiflexion: 3       Assessment & Plan      Assessment  Impairments: abnormal coordination, abnormal gait, abnormal muscle firing, abnormal muscle tone, abnormal or restricted ROM, activity intolerance, impaired balance, impaired physical strength, lacks appropriate home exercise program, pain with function and safety issue  Functional Limitations: carrying objects, lifting, sleeping, walking, pulling, pushing, uncomfortable because of pain, moving in bed, standing, stooping, reaching behind back, reaching overhead and unable to perform repetitive tasks  Assessment details: Pt demonstrates mild improvements in R shoulder strength and R knee strength but continues to demonstrate significant deficits in R shoulder strength/ROM and bilateral LE strength, overall balance and reports of pain limiting function as shown on the Quick DASH.  Pt continues to demonstrate significant deficits during gait with forward flexion, intermittent L toe drag and increased reliance of UEs on walker to compensate for B LE weakness and decreased balance. Pt educated today on need for L AFO and platform walker in order to decrease falls risk, allow for improvements in gait and balance as well as improved functional endurance.  Pt reports understanding and states that he will discuss this with his wife.  Pt would continue to benefit from skilled PT services in order to address remaining deficits limiting function at this time.      Goals  Plan Goals: 1. The patient has limited ROM of the right shoulder.    LTG 1: 12 weeks:  The patient will demonstrate 120 degrees of right shoulder flexion, 120 degrees of shoulder abduction, and shoulder internal rotation to L1 to allow the patient to reach into upper kitchen cabinets and manipulate clothing behind the back with greater ease.   STATUS:  Partially met, continue  STG 1a: 6 weeks:  The patient will demonstrate 110 degrees of right shoulder flexion, 100 degrees of shoulder abduction, and shoulder internal rotation to L3 to allow the  patient to reach into upper kitchen cabinets and manipulate clothing behind the back with greater ease.   STATUS: partially met, continue     2. The patient has limited strength of the right shoulder.   LTG 2: 12 weeks:  The patient will demonstrate 4/5 strength for right shoulder flexion, abduction, external rotation, and internal rotation in order to demonstrate improved shoulder stability.   STATUS:  Not met, continue  STG 2a: 6 weeks:  The patient will demonstrate 3/5 strength for right shoulder flexion, abduction, external rotation, and internal rotation.   STATUS:  Partially met, continue  STG2b:  4 weeks:  The patient will be independent with home exercises.    STATUS: ongoing     3. The patient complains of pain to the right shoulder.   LTG 3: 12 weeks:  The patient will report a pain rating of 3/10 or better in order to improve sleep quality and tolerance to performance of activities of daily living.   STATUS:  Not met, continue  STG 3a: 6 weeks:  The patient will report a pain rating of 5/10 or better.    STATUS: not met consistently, continue     4. Carrying, Moving, and Handling Objects Functional Limitation     LTG 4: 12 weeks:  The patient will demonstrate 20-39% limitation by achieving a score of 27 on the Quick DASH.   STATUS:  Not met, continue  STG 4a: 6 weeks:  The patient will demonstrate 40-59% limitation by achieving a score of 36 on the Quick DASH.     STATUS: not met, continue    5. The patient demonstrates weakness of the bilateral hip/knee.   LTG 5: 12weeks:  The patient will demonstrate 4/5 strength for bilateral hip flexion, abduction, and extension in order to improve hip stability.   STATUS: not met, continue  STG 5a: 6weeks:  The patient will demonstrate 4/5 strength for bilateral knee flexion and extension.   STATUS: partially met, continue    Plan  Therapy options: will be seen for skilled therapy services  Planned therapy interventions: abdominal trunk stabilization, manual therapy,  postural training, neuromuscular re-education, motor coordination training, strengthening, stretching, therapeutic activities, transfer training, home exercise program, gait training, functional ROM exercises, flexibility and balance/weight-bearing training  Frequency: 2x week  Duration in weeks: 4  Treatment plan discussed with: patient and family      Progress toward previous goals: Partially Met        Recommendations: Continue as planned  Timeframe: 1 month  Prognosis to achieve goals: fair    PT SIGNATURE: Miriam Bryant, PT     Electronically signed 2022  DATE TREATMENT INITIATED: 2022  [place] License: [id number]     Certification Period: 2022 thru 2022    Based upon review of the patient's progress and continued therapy plan, it is my medical opinion that Haider Jacobs should continue physical therapy treatment at [facility]  [address] [place] [id number]  [phone].    Signature: __________________________________  Sylvia Carrillo MD  NPI: [id number]    Timed:  Manual Therapy:    0     mins  98637;  Therapeutic Exercise:    20     mins  05725;     Neuromuscular Karina:    0    mins  29872;    Therapeutic Activity:     15     mins  80686;     Gait Trainin     mins  91484;     Ultrasound:     0     mins  63618;    Electrical Stimulation:    0     mins  80019 ( );    Untimed:  Electrical Stimulation:    0     mins  66580 ( );  Mechanical Traction:    0     mins  81256;     Timed Treatment:   43   mins   Total Treatment:     50   mins

## 2022-08-11 ENCOUNTER — TELEPHONE (OUTPATIENT)
Dept: PHYSICAL THERAPY | Facility: CLINIC | Age: 52
End: 2022-08-11

## 2022-11-15 ENCOUNTER — TRANSCRIBE ORDERS (OUTPATIENT)
Dept: ADMINISTRATIVE | Facility: HOSPITAL | Age: 52
End: 2022-11-15

## 2022-11-15 DIAGNOSIS — R63.4 ABNORMAL WEIGHT LOSS: Primary | ICD-10-CM

## 2022-12-07 ENCOUNTER — DOCUMENTATION (OUTPATIENT)
Dept: PHYSICAL THERAPY | Facility: CLINIC | Age: 52
End: 2022-12-07

## 2022-12-15 ENCOUNTER — APPOINTMENT (OUTPATIENT)
Dept: CT IMAGING | Facility: HOSPITAL | Age: 52
End: 2022-12-15

## 2022-12-28 ENCOUNTER — HOSPITAL ENCOUNTER (EMERGENCY)
Facility: HOSPITAL | Age: 52
Discharge: HOME | End: 2022-12-28
Attending: EMERGENCY MEDICINE | Admitting: EMERGENCY MEDICINE
Payer: COMMERCIAL

## 2022-12-28 VITALS
SYSTOLIC BLOOD PRESSURE: 96 MMHG | WEIGHT: 139.77 LBS | BODY MASS INDEX: 21.18 KG/M2 | DIASTOLIC BLOOD PRESSURE: 62 MMHG | OXYGEN SATURATION: 94 % | RESPIRATION RATE: 19 BRPM | HEART RATE: 70 BPM | HEIGHT: 68 IN | TEMPERATURE: 99.3 F

## 2022-12-28 DIAGNOSIS — Z20.822 EXPOSURE TO COVID-19 VIRUS: ICD-10-CM

## 2022-12-28 DIAGNOSIS — R50.9 FEVER, UNSPECIFIED: ICD-10-CM

## 2022-12-28 DIAGNOSIS — R30.0 DYSURIA: Primary | ICD-10-CM

## 2022-12-28 LAB
ALBUMIN SERPL-MCNC: 3.8 G/DL (ref 3.5–5.2)
ALBUMIN/GLOB SERPL: 1.4 G/DL
ALP SERPL-CCNC: 71 U/L (ref 39–117)
ALT SERPL W P-5'-P-CCNC: 19 U/L (ref 1–41)
ANION GAP SERPL CALCULATED.3IONS-SCNC: 11.2 MMOL/L (ref 5–15)
AST SERPL-CCNC: 41 U/L (ref 1–40)
BASOPHILS # BLD AUTO: 0.02 10*3/MM3 (ref 0–0.2)
BASOPHILS NFR BLD AUTO: 0.4 % (ref 0–1.5)
BILIRUB SERPL-MCNC: 0.2 MG/DL (ref 0–1.2)
BILIRUB UR QL STRIP: NEGATIVE
BUN SERPL-MCNC: 20 MG/DL (ref 6–20)
BUN/CREAT SERPL: 17.7 (ref 7–25)
CALCIUM SPEC-SCNC: 8.9 MG/DL (ref 8.6–10.5)
CHLORIDE SERPL-SCNC: 100 MMOL/L (ref 98–107)
CLARITY UR: CLEAR
CO2 SERPL-SCNC: 24.8 MMOL/L (ref 22–29)
COLOR UR: YELLOW
CREAT SERPL-MCNC: 1.13 MG/DL (ref 0.76–1.27)
D-LACTATE SERPL-SCNC: 1 MMOL/L (ref 0.5–2)
DEPRECATED RDW RBC AUTO: 43.8 FL (ref 37–54)
EGFRCR SERPLBLD CKD-EPI 2021: 77.2 ML/MIN/1.73
EOSINOPHIL # BLD AUTO: 0.05 10*3/MM3 (ref 0–0.4)
EOSINOPHIL NFR BLD AUTO: 1.1 % (ref 0.3–6.2)
ERYTHROCYTE [DISTWIDTH] IN BLOOD BY AUTOMATED COUNT: 14.1 % (ref 12.3–15.4)
GLOBULIN UR ELPH-MCNC: 2.7 GM/DL
GLUCOSE SERPL-MCNC: 89 MG/DL (ref 65–99)
GLUCOSE UR STRIP-MCNC: NEGATIVE MG/DL
HCT VFR BLD AUTO: 40 % (ref 37.5–51)
HGB BLD-MCNC: 13.4 G/DL (ref 13–17.7)
HGB UR QL STRIP.AUTO: NEGATIVE
HOLD SPECIMEN: NORMAL
HOLD SPECIMEN: NORMAL
IMM GRANULOCYTES # BLD AUTO: 0 10*3/MM3 (ref 0–0.05)
IMM GRANULOCYTES NFR BLD AUTO: 0 % (ref 0–0.5)
KETONES UR QL STRIP: ABNORMAL
LEUKOCYTE ESTERASE UR QL STRIP.AUTO: NEGATIVE
LYMPHOCYTES # BLD AUTO: 0.46 10*3/MM3 (ref 0.7–3.1)
LYMPHOCYTES NFR BLD AUTO: 10 % (ref 19.6–45.3)
MCH RBC QN AUTO: 28.5 PG (ref 26.6–33)
MCHC RBC AUTO-ENTMCNC: 33.5 G/DL (ref 31.5–35.7)
MCV RBC AUTO: 85.1 FL (ref 79–97)
MONOCYTES # BLD AUTO: 0.42 10*3/MM3 (ref 0.1–0.9)
MONOCYTES NFR BLD AUTO: 9.1 % (ref 5–12)
NEUTROPHILS NFR BLD AUTO: 3.65 10*3/MM3 (ref 1.7–7)
NEUTROPHILS NFR BLD AUTO: 79.4 % (ref 42.7–76)
NITRITE UR QL STRIP: NEGATIVE
NRBC BLD AUTO-RTO: 0 /100 WBC (ref 0–0.2)
PH UR STRIP.AUTO: 5.5 [PH] (ref 5–8)
PLATELET # BLD AUTO: 67 10*3/MM3 (ref 140–450)
PMV BLD AUTO: 11.1 FL (ref 6–12)
POTASSIUM SERPL-SCNC: 4.7 MMOL/L (ref 3.5–5.2)
PROT SERPL-MCNC: 6.5 G/DL (ref 6–8.5)
PROT UR QL STRIP: NEGATIVE
RBC # BLD AUTO: 4.7 10*6/MM3 (ref 4.14–5.8)
SARS-COV-2 RNA PNL SPEC NAA+PROBE: DETECTED
SODIUM SERPL-SCNC: 136 MMOL/L (ref 136–145)
SP GR UR STRIP: 1.02 (ref 1–1.03)
UROBILINOGEN UR QL STRIP: ABNORMAL
WBC NRBC COR # BLD: 4.6 10*3/MM3 (ref 3.4–10.8)
WHOLE BLOOD HOLD COAG: NORMAL
WHOLE BLOOD HOLD SPECIMEN: NORMAL

## 2022-12-28 PROCEDURE — U0004 COV-19 TEST NON-CDC HGH THRU: HCPCS | Performed by: NURSE PRACTITIONER

## 2022-12-28 PROCEDURE — 87040 BLOOD CULTURE FOR BACTERIA: CPT | Performed by: EMERGENCY MEDICINE

## 2022-12-28 PROCEDURE — 81003 URINALYSIS AUTO W/O SCOPE: CPT | Performed by: EMERGENCY MEDICINE

## 2022-12-28 PROCEDURE — 99284 EMERGENCY DEPT VISIT MOD MDM: CPT

## 2022-12-28 PROCEDURE — 85025 COMPLETE CBC W/AUTO DIFF WBC: CPT

## 2022-12-28 PROCEDURE — C9803 HOPD COVID-19 SPEC COLLECT: HCPCS | Performed by: NURSE PRACTITIONER

## 2022-12-28 PROCEDURE — 36415 COLL VENOUS BLD VENIPUNCTURE: CPT

## 2022-12-28 PROCEDURE — 80053 COMPREHEN METABOLIC PANEL: CPT

## 2022-12-28 PROCEDURE — 83605 ASSAY OF LACTIC ACID: CPT | Performed by: EMERGENCY MEDICINE

## 2022-12-28 RX ORDER — SODIUM CHLORIDE 0.9 % (FLUSH) 0.9 %
10 SYRINGE (ML) INJECTION AS NEEDED
Status: DISCONTINUED | OUTPATIENT
Start: 2022-12-28 | End: 2022-12-28 | Stop reason: HOSPADM

## 2022-12-28 NOTE — ED PROVIDER NOTES
Subjective   History of Present Illness  This is a 52-year-old male patient who presents to the emergency department today with complaints of fever and dysuria.  Patient reports this has been an ongoing and persistent issue for the last month.  Fevers developed over the last 3 to 4 days.  He reports T-max of 104.5 at home today.  Patient states he has been alternating Tylenol and ibuprofen for symptoms and for his fever.  History of spinal cord injury.  Patient states that he no longer has to self cath and is able to urinate on his own.  He denies any hematuria.  He denies any abdominal pain, nausea, vomiting.  He does report that his wife currently has COVID at home.  Denies any cough.  Patient does have a runny nose and nasal congestion.  Denies any headache or neck pain.        Review of Systems   Constitutional: Positive for chills and fever. Negative for activity change.   HENT: Positive for congestion and rhinorrhea. Negative for sore throat and trouble swallowing.    Eyes: Negative.    Respiratory: Negative for cough, chest tightness and shortness of breath.    Cardiovascular: Negative for chest pain.   Gastrointestinal: Negative for abdominal pain, constipation, diarrhea, nausea and vomiting.   Endocrine: Negative.    Genitourinary: Positive for difficulty urinating and dysuria. Negative for decreased urine volume, flank pain, frequency, hematuria, penile discharge, penile pain, testicular pain and urgency.   Musculoskeletal: Positive for back pain and gait problem (Patient is paralyzed and has spinal cord injury). Negative for neck pain and neck stiffness.   Skin: Negative for color change, rash and wound.   Allergic/Immunologic: Negative.    Neurological: Negative for dizziness, syncope, weakness, light-headedness and headaches.   Hematological: Negative.    Psychiatric/Behavioral: Negative.        Past Medical History:   Diagnosis Date   • Anxiety    • Spinal cord injury at C1-C4 level without spinal  injury (HCC)        Allergies   Allergen Reactions   • Varenicline Other (See Comments)     dizziness       Past Surgical History:   Procedure Laterality Date   • BACK SURGERY      C2-7 Fusion       History reviewed. No pertinent family history.    Social History     Socioeconomic History   • Marital status:    Tobacco Use   • Smoking status: Every Day     Packs/day: 1.00     Types: Cigarettes   • Smokeless tobacco: Never   Vaping Use   • Vaping Use: Never used   Substance and Sexual Activity   • Alcohol use: Not Currently   • Drug use: Not Currently   • Sexual activity: Defer           Objective   Physical Exam  Vitals and nursing note reviewed.   Constitutional:       General: He is not in acute distress.     Appearance: Normal appearance. He is not ill-appearing, toxic-appearing or diaphoretic.   HENT:      Head: Normocephalic and atraumatic.      Nose: Congestion and rhinorrhea present.      Mouth/Throat:      Mouth: Mucous membranes are moist.      Pharynx: Oropharynx is clear.   Eyes:      Extraocular Movements: Extraocular movements intact.      Conjunctiva/sclera: Conjunctivae normal.      Pupils: Pupils are equal, round, and reactive to light.   Cardiovascular:      Rate and Rhythm: Normal rate and regular rhythm.      Pulses: Normal pulses.      Heart sounds: Normal heart sounds. No murmur heard.    No gallop.   Pulmonary:      Effort: Pulmonary effort is normal. No respiratory distress.      Breath sounds: Normal breath sounds. No wheezing, rhonchi or rales.   Chest:      Chest wall: No tenderness.   Abdominal:      General: Bowel sounds are normal. There is no distension.      Palpations: Abdomen is soft.      Tenderness: There is no abdominal tenderness. There is no right CVA tenderness, left CVA tenderness or guarding.   Musculoskeletal:         General: No tenderness. Normal range of motion.      Cervical back: Normal range of motion and neck supple. No rigidity.   Skin:     General: Skin is  warm and dry.      Capillary Refill: Capillary refill takes less than 2 seconds.      Coloration: Skin is not pale.      Findings: No erythema or rash.   Neurological:      Mental Status: He is alert and oriented to person, place, and time. Mental status is at baseline.      Motor: No weakness.      Gait: Gait abnormal.   Psychiatric:         Mood and Affect: Mood normal.         Behavior: Behavior normal.         Procedures           ED Course                                           Medical Decision Making  Dysuria: acute illness or injury  Exposure to COVID-19 virus: acute illness or injury  Fever, unspecified: acute illness or injury  Amount and/or Complexity of Data Reviewed  External Data Reviewed: notes.  Labs: ordered.     Details: CMP and CBC unremarkable. Urinalysis normal. No evidence of UTI. Patient has remained afebrile during visit here in the ER.       Risk  Prescription drug management.    Risk Details: Patient is hemodynamically stable. Workup has been reassuring. No evidence or suspicion of sepsis.         Final diagnoses:   Dysuria   Exposure to COVID-19 virus   Fever, unspecified       ED Disposition  ED Disposition     ED Disposition   Discharge    Condition   Stable    Comment   --             Ronen Ohara MD  [address] [facility] [address]  [phone]    Schedule an appointment as soon as possible for a visit in 1 day  If symptoms worsen    [facility] EMERGENCY ROOM  [address] [facility] [address] [phone]  Go to   As needed, If symptoms worsen         Medication List      No changes were made to your prescriptions during this visit.          Jennifer Murphy, APRN  12/28/22 [id number]

## 2022-12-28 NOTE — ED NOTES
Pt was assisted with dressing and assisted to wheelchair. Pt was taken to ED exit for wife to pick-up

## 2022-12-28 NOTE — DISCHARGE INSTRUCTIONS
Your labs today were all normal. Your urine was clear. No evidence of urinary tract infection. Please follow up with your family doctor in 2-3 days for recheck if you do not feel any better. Your covid test is pending. If the result is positive, someone should notify you of the result or you can check on Mychart. Return to the ER with any new or worsening symptoms.

## 2023-01-02 LAB
BACTERIA SPEC AEROBE CULT: NORMAL
BACTERIA SPEC AEROBE CULT: NORMAL

## 2023-01-06 ENCOUNTER — APPOINTMENT (OUTPATIENT)
Dept: CT IMAGING | Facility: HOSPITAL | Age: 53
End: 2023-01-06
Payer: COMMERCIAL

## 2023-06-17 ENCOUNTER — APPOINTMENT (OUTPATIENT)
Dept: CT IMAGING | Facility: HOSPITAL | Age: 53
End: 2023-06-17
Payer: COMMERCIAL

## 2023-06-17 ENCOUNTER — HOSPITAL ENCOUNTER (EMERGENCY)
Facility: HOSPITAL | Age: 53
Discharge: HOME | End: 2023-06-17
Attending: EMERGENCY MEDICINE
Payer: COMMERCIAL

## 2023-06-17 VITALS
SYSTOLIC BLOOD PRESSURE: 103 MMHG | RESPIRATION RATE: 18 BRPM | OXYGEN SATURATION: 98 % | TEMPERATURE: 98.9 F | HEIGHT: 68 IN | DIASTOLIC BLOOD PRESSURE: 66 MMHG | HEART RATE: 56 BPM | BODY MASS INDEX: 20.28 KG/M2 | WEIGHT: 133.82 LBS

## 2023-06-17 DIAGNOSIS — R10.32 ACUTE BILATERAL LOWER ABDOMINAL PAIN: Primary | ICD-10-CM

## 2023-06-17 DIAGNOSIS — R33.8 ACUTE URINARY RETENTION: ICD-10-CM

## 2023-06-17 DIAGNOSIS — K59.00 CONSTIPATION, UNSPECIFIED CONSTIPATION TYPE: ICD-10-CM

## 2023-06-17 DIAGNOSIS — R10.31 ACUTE BILATERAL LOWER ABDOMINAL PAIN: Primary | ICD-10-CM

## 2023-06-17 DIAGNOSIS — Z86.69 HISTORY OF PARAPLEGIA: ICD-10-CM

## 2023-06-17 LAB
ALBUMIN SERPL-MCNC: 4.2 G/DL (ref 3.5–5.2)
ALBUMIN/GLOB SERPL: 1.8 G/DL
ALP SERPL-CCNC: 79 U/L (ref 39–117)
ALT SERPL W P-5'-P-CCNC: 7 U/L (ref 1–41)
ANION GAP SERPL CALCULATED.3IONS-SCNC: 8.8 MMOL/L (ref 5–15)
AST SERPL-CCNC: 13 U/L (ref 1–40)
BASOPHILS # BLD AUTO: 0.03 10*3/MM3 (ref 0–0.2)
BASOPHILS NFR BLD AUTO: 0.5 % (ref 0–1.5)
BILIRUB SERPL-MCNC: 0.4 MG/DL (ref 0–1.2)
BILIRUB UR QL STRIP: NEGATIVE
BUN SERPL-MCNC: 13 MG/DL (ref 6–20)
BUN/CREAT SERPL: 13.3 (ref 7–25)
CALCIUM SPEC-SCNC: 9.2 MG/DL (ref 8.6–10.5)
CHLORIDE SERPL-SCNC: 102 MMOL/L (ref 98–107)
CLARITY UR: CLEAR
CO2 SERPL-SCNC: 28.2 MMOL/L (ref 22–29)
COLOR UR: YELLOW
CREAT SERPL-MCNC: 0.98 MG/DL (ref 0.76–1.27)
D-LACTATE SERPL-SCNC: 1.1 MMOL/L (ref 0.5–2)
DEPRECATED RDW RBC AUTO: 42.4 FL (ref 37–54)
EGFRCR SERPLBLD CKD-EPI 2021: 91.6 ML/MIN/1.73
EOSINOPHIL # BLD AUTO: 0.1 10*3/MM3 (ref 0–0.4)
EOSINOPHIL NFR BLD AUTO: 1.6 % (ref 0.3–6.2)
ERYTHROCYTE [DISTWIDTH] IN BLOOD BY AUTOMATED COUNT: 13.1 % (ref 12.3–15.4)
GLOBULIN UR ELPH-MCNC: 2.4 GM/DL
GLUCOSE SERPL-MCNC: 115 MG/DL (ref 65–99)
GLUCOSE UR STRIP-MCNC: NEGATIVE MG/DL
HCT VFR BLD AUTO: 41.7 % (ref 37.5–51)
HGB BLD-MCNC: 13.9 G/DL (ref 13–17.7)
HGB UR QL STRIP.AUTO: NEGATIVE
HOLD SPECIMEN: NORMAL
HOLD SPECIMEN: NORMAL
IMM GRANULOCYTES # BLD AUTO: 0.01 10*3/MM3 (ref 0–0.05)
IMM GRANULOCYTES NFR BLD AUTO: 0.2 % (ref 0–0.5)
KETONES UR QL STRIP: NEGATIVE
LEUKOCYTE ESTERASE UR QL STRIP.AUTO: NEGATIVE
LIPASE SERPL-CCNC: 15 U/L (ref 13–60)
LYMPHOCYTES # BLD AUTO: 1.3 10*3/MM3 (ref 0.7–3.1)
LYMPHOCYTES NFR BLD AUTO: 20.8 % (ref 19.6–45.3)
MCH RBC QN AUTO: 29.3 PG (ref 26.6–33)
MCHC RBC AUTO-ENTMCNC: 33.3 G/DL (ref 31.5–35.7)
MCV RBC AUTO: 88 FL (ref 79–97)
MONOCYTES # BLD AUTO: 0.42 10*3/MM3 (ref 0.1–0.9)
MONOCYTES NFR BLD AUTO: 6.7 % (ref 5–12)
NEUTROPHILS NFR BLD AUTO: 4.4 10*3/MM3 (ref 1.7–7)
NEUTROPHILS NFR BLD AUTO: 70.2 % (ref 42.7–76)
NITRITE UR QL STRIP: NEGATIVE
NRBC BLD AUTO-RTO: 0 /100 WBC (ref 0–0.2)
PH UR STRIP.AUTO: 6 [PH] (ref 5–8)
PLATELET # BLD AUTO: 124 10*3/MM3 (ref 140–450)
PMV BLD AUTO: 11.4 FL (ref 6–12)
POTASSIUM SERPL-SCNC: 4.7 MMOL/L (ref 3.5–5.2)
PROT SERPL-MCNC: 6.6 G/DL (ref 6–8.5)
PROT UR QL STRIP: NEGATIVE
RBC # BLD AUTO: 4.74 10*6/MM3 (ref 4.14–5.8)
SODIUM SERPL-SCNC: 139 MMOL/L (ref 136–145)
SP GR UR STRIP: 1.02 (ref 1–1.03)
UROBILINOGEN UR QL STRIP: NORMAL
WBC NRBC COR # BLD: 6.26 10*3/MM3 (ref 3.4–10.8)
WHOLE BLOOD HOLD COAG: NORMAL
WHOLE BLOOD HOLD SPECIMEN: NORMAL

## 2023-06-17 PROCEDURE — 25010000002 ONDANSETRON PER 1 MG: Performed by: EMERGENCY MEDICINE

## 2023-06-17 PROCEDURE — 85025 COMPLETE CBC W/AUTO DIFF WBC: CPT

## 2023-06-17 PROCEDURE — 83690 ASSAY OF LIPASE: CPT

## 2023-06-17 PROCEDURE — 81003 URINALYSIS AUTO W/O SCOPE: CPT | Performed by: EMERGENCY MEDICINE

## 2023-06-17 PROCEDURE — 83605 ASSAY OF LACTIC ACID: CPT

## 2023-06-17 PROCEDURE — 36415 COLL VENOUS BLD VENIPUNCTURE: CPT

## 2023-06-17 PROCEDURE — 25010000002 HYDROMORPHONE 1 MG/ML SOLUTION: Performed by: EMERGENCY MEDICINE

## 2023-06-17 PROCEDURE — 25510000001 IOPAMIDOL PER 1 ML: Performed by: REGISTERED NURSE

## 2023-06-17 PROCEDURE — 25010000002 KETOROLAC TROMETHAMINE PER 15 MG: Performed by: REGISTERED NURSE

## 2023-06-17 PROCEDURE — 74177 CT ABD & PELVIS W/CONTRAST: CPT

## 2023-06-17 PROCEDURE — 80053 COMPREHEN METABOLIC PANEL: CPT

## 2023-06-17 RX ORDER — KETOROLAC TROMETHAMINE 15 MG/ML
15 INJECTION, SOLUTION INTRAMUSCULAR; INTRAVENOUS ONCE
Status: COMPLETED | OUTPATIENT
Start: 2023-06-17 | End: 2023-06-17

## 2023-06-17 RX ORDER — ONDANSETRON 2 MG/ML
4 INJECTION INTRAMUSCULAR; INTRAVENOUS ONCE
Status: COMPLETED | OUTPATIENT
Start: 2023-06-17 | End: 2023-06-17

## 2023-06-17 RX ORDER — SODIUM CHLORIDE 0.9 % (FLUSH) 0.9 %
10 SYRINGE (ML) INJECTION AS NEEDED
Status: DISCONTINUED | OUTPATIENT
Start: 2023-06-17 | End: 2023-06-17 | Stop reason: HOSPADM

## 2023-06-17 RX ORDER — PHENAZOPYRIDINE HYDROCHLORIDE 100 MG/1
200 TABLET, FILM COATED ORAL ONCE
Status: COMPLETED | OUTPATIENT
Start: 2023-06-17 | End: 2023-06-17

## 2023-06-17 RX ADMIN — PHENAZOPYRIDINE HYDROCHLORIDE 200 MG: 100 TABLET ORAL at 11:11

## 2023-06-17 RX ADMIN — SODIUM CHLORIDE 1000 ML: 9 INJECTION, SOLUTION INTRAVENOUS at 11:10

## 2023-06-17 RX ADMIN — ONDANSETRON 4 MG: 2 INJECTION INTRAMUSCULAR; INTRAVENOUS at 12:38

## 2023-06-17 RX ADMIN — KETOROLAC TROMETHAMINE 15 MG: 15 INJECTION, SOLUTION INTRAMUSCULAR; INTRAVENOUS at 11:12

## 2023-06-17 RX ADMIN — IOPAMIDOL 100 ML: 755 INJECTION, SOLUTION INTRAVENOUS at 11:23

## 2023-06-17 RX ADMIN — HYDROMORPHONE HYDROCHLORIDE 1 MG: 1 INJECTION, SOLUTION INTRAMUSCULAR; INTRAVENOUS; SUBCUTANEOUS at 12:40

## 2023-06-17 NOTE — ED NOTES
Pt vocalized to RN he cannot urinate at this time but thinks he will be able to after liter of fluid finishes.

## 2023-06-17 NOTE — DISCHARGE INSTRUCTIONS
Your CT scan just showed some mild constipation and some mild distention of your bladder so we placed a Bajwa catheter to drain.    Your blood work and urine actually showed no infection though.    Leave the Bajwa catheter in for now and allow it to drain your bladder and just simply empty out the urine bag when it fills up.    You should call the urology clinic Monday morning for a follow-up appointment for further evaluation of your difficulty urinating.

## 2023-06-17 NOTE — ED PROVIDER NOTES
Time: 10:51 AM EDT  Date of encounter:  6/17/2023  Independent Historian/Clinical History and Information was obtained by:   Patient  Chief Complaint   Patient presents with    Dysuria     waist and back pain hurt when voiding.       History is limited by: N/A    History of Present Illness:  Patient is a 52 y.o. year old male who presents to the emergency department for evaluation of painful urination, foul-smelling urine as well as suprapubic and bilateral lower abdominal pain.  This been going on for about 1 week now.  Patient has a history of spinal cord injury and paraplegic, and typically has trouble with urination but does not require self-catheterization.  Denies nausea/vomiting, fever/chills, diarrhea/constipation, bloody stools or hematuria.  Patient reports taking fentanyl this morning.   HPI    Patient Care Team  Primary Care Provider: Ronen Ohara MD    Past Medical History:     Allergies   Allergen Reactions    Varenicline Other (See Comments)     dizziness     Past Medical History:   Diagnosis Date    Anxiety     Spinal cord injury at C1-C4 level without spinal injury      Past Surgical History:   Procedure Laterality Date    BACK SURGERY      C2-7 Fusion     History reviewed. No pertinent family history.    Home Medications:  Prior to Admission medications    Medication Sig Start Date End Date Taking? Authorizing Provider   baclofen (LIORESAL) 20 MG tablet baclofen 20 mg oral tablet take 1 tablet by oral route 4 times a day   Active    Provider, MD José Antonio   busPIRone (BUSPAR) 15 MG tablet buspirone 15 mg oral tablet take 1 tablet (15 mg) by oral route 2 times per day   Active    Provider, MD José Antonio   diazePAM (Valium) 10 MG tablet Valium 10 mg oral tablet take 1 tablet 1 hr prior to MRI, repeat 30 min prior to MRI prn.   Active    Provider, MD José Antonio   gabapentin (NEURONTIN) 400 MG capsule gabapentin 400 mg oral capsule take 1 capsule (400 mg) by oral route 3 times per day    "Active    José Antonio Esposito MD   HYDROcodone-acetaminophen (NORCO) 7.5-325 MG per tablet hydrocodone-acetaminophen 7.5-325 mg oral tablet take 1 tablet by oral route 3 times a day   Active    José Antonio Esposito MD   hydrOXYzine (ATARAX) 50 MG tablet hydroxyzine HCl 50 mg oral tablet take 1 tablet by oral route daily   Active    José Antonio Esposito MD   ibuprofen (ADVIL,MOTRIN) 800 MG tablet Take 800 mg by mouth 2 (Two) Times a Day With Meals. 12/29/21   José Antonio Esposito MD   omeprazole (priLOSEC) 40 MG capsule Take 40 mg by mouth Daily. 12/29/21   José Antonio Esposito MD   oxyCODONE-acetaminophen (PERCOCET) 7.5-325 MG per tablet Take 1 tablet by mouth Every 6 (Six) Hours As Needed for Moderate Pain . 2/4/22   Ciro Fuller MD   QUEtiapine Fumarate (SEROQUEL PO) Take  by mouth.    ProviderJosé Antonio MD        Social History:   Social History     Tobacco Use    Smoking status: Every Day     Packs/day: 1.00     Types: Cigarettes    Smokeless tobacco: Never   Vaping Use    Vaping Use: Never used   Substance Use Topics    Alcohol use: Not Currently    Drug use: Yes     Comment: fentanyl powder \"off the street\" for managing pain         Review of Systems:  Review of Systems   Gastrointestinal:  Positive for abdominal pain.   Genitourinary:  Positive for dysuria.      Physical Exam:  /66 (BP Location: Left arm, Patient Position: Sitting)   Pulse 56   Temp 98.9 °F (37.2 °C) (Oral)   Resp 18   Ht 172.7 cm (68\")   Wt 60.7 kg (133 lb 13.1 oz)   SpO2 98%   BMI 20.35 kg/m²     General: Awake alert and in no obvious distress, paraplegic from waist down    HEENT: Head normocephalic atraumatic, eyes PERRLA EOMI, nose normal, oropharynx normal.    Neck: Supple full range of motion, no meningismus, no lymphadenopathy    Heart: Regular rate and rhythm, no murmurs or rubs, 2+ radial pulses bilaterally    Lungs: Clear to auscultation bilaterally without wheezes or crackles, no respiratory " distress    Abdomen: Soft, mild suprapubic fullness and tenderness, nondistended, no rebound or guarding    Skin: Warm, dry, no rash    Musculoskeletal: Normal range of motion, no lower extremity edema    Neurologic: Oriented x3, no motor deficits no sensory deficits    Psychiatric: Mood appears stable, no psychosis            Procedures:  Procedures      Medical Decision Making:      Comorbidities that affect care:    Spinal cord injury with paraplegia    External Notes reviewed:    None      The following orders were placed and all results were independently analyzed by me:  Orders Placed This Encounter   Procedures    CT Abdomen Pelvis With Contrast    [place] Draw    Comprehensive Metabolic Panel    Lipase    Urinalysis With Microscopic If Indicated (No Culture) - Urine, Clean Catch    Lactic Acid, Plasma    CBC Auto Differential    Undress & Gown    Insert Indwelling Urinary Catheter    CBC & Differential    Green Top (Gel)    Lavender Top    Gold Top - SST    Light Blue Top       Medications Given in the Emergency Department:  Medications   sodium chloride 0.9 % bolus 1,000 mL (0 mL Intravenous Stopped 6/17/23 1140)   phenazopyridine (PYRIDIUM) tablet 200 mg (200 mg Oral Given 6/17/23 1111)   ketorolac (TORADOL) injection 15 mg (15 mg Intravenous Given 6/17/23 1112)   iopamidol (ISOVUE-370) 76 % injection 100 mL (100 mL Intravenous Given 6/17/23 1123)   HYDROmorphone (DILAUDID) injection 1 mg (1 mg Intravenous Given 6/17/23 1240)   ondansetron (ZOFRAN) injection 4 mg (4 mg Intravenous Given 6/17/23 1238)        ED Course:    ED Course as of 06/17/23 1835   Sat Jun 17, 2023   1249 Ketones, UA: Negative [VS]      ED Course User Index  [VS] Freddie Conrad MD       Labs:    Lab Results (last 24 hours)       Procedure Component Value Units Date/Time    CBC & Differential [[id number]]  (Abnormal) Collected: 06/17/23 1029    Specimen: Blood Updated: 06/17/23 1106    Narrative:      The following orders were created  for panel order CBC & Differential.  Procedure                               Abnormality         Status                     ---------                               -----------         ------                     CBC Auto Differential[[id number]]        Abnormal            Final result               Scan Slide[[id number]]                                                                    Please view results for these tests on the individual orders.    Comprehensive Metabolic Panel [[id number]]  (Abnormal) Collected: 06/17/23 1029    Specimen: Blood Updated: 06/17/23 1120     Glucose 115 mg/dL      BUN 13 mg/dL      Creatinine 0.98 mg/dL      Sodium 139 mmol/L      Potassium 4.7 mmol/L      Chloride 102 mmol/L      CO2 28.2 mmol/L      Calcium 9.2 mg/dL      Total Protein 6.6 g/dL      Albumin 4.2 g/dL      ALT (SGPT) 7 U/L      AST (SGOT) 13 U/L      Alkaline Phosphatase 79 U/L      Total Bilirubin 0.4 mg/dL      Globulin 2.4 gm/dL      A/G Ratio 1.8 g/dL      BUN/Creatinine Ratio 13.3     Anion Gap 8.8 mmol/L      eGFR 91.6 mL/min/1.73     Narrative:      GFR Normal >60  Chronic Kidney Disease <60  Kidney Failure <15      Lipase [[id number]]  (Normal) Collected: 06/17/23 1029    Specimen: Blood Updated: 06/17/23 1120     Lipase 15 U/L     Lactic Acid, Plasma [[id number]]  (Normal) Collected: 06/17/23 1029    Specimen: Blood Updated: 06/17/23 1116     Lactate 1.1 mmol/L     CBC Auto Differential [[id number]]  (Abnormal) Collected: 06/17/23 1029    Specimen: Blood Updated: 06/17/23 1106     WBC 6.26 10*3/mm3      RBC 4.74 10*6/mm3      Hemoglobin 13.9 g/dL      Hematocrit 41.7 %      MCV 88.0 fL      MCH 29.3 pg      MCHC 33.3 g/dL      RDW 13.1 %      RDW-SD 42.4 fl      MPV 11.4 fL      Platelets 124 10*3/mm3      Neutrophil % 70.2 %      Lymphocyte % 20.8 %      Monocyte % 6.7 %      Eosinophil % 1.6 %      Basophil % 0.5 %      Immature Grans % 0.2 %      Neutrophils, Absolute 4.40 10*3/mm3      Lymphocytes, Absolute  1.30 10*3/mm3      Monocytes, Absolute 0.42 10*3/mm3      Eosinophils, Absolute 0.10 10*3/mm3      Basophils, Absolute 0.03 10*3/mm3      Immature Grans, Absolute 0.01 10*3/mm3      nRBC 0.0 /100 WBC     Urinalysis With Microscopic If Indicated (No Culture) - Urine, Clean Catch [[id number]]  (Normal) Collected: 06/17/23 1202    Specimen: Urine, Clean Catch Updated: 06/17/23 1222     Color, UA Yellow     Appearance, UA Clear     pH, UA 6.0     Specific Gravity, UA 1.021     Glucose, UA Negative     Ketones, UA Negative     Bilirubin, UA Negative     Blood, UA Negative     Protein, UA Negative     Leuk Esterase, UA Negative     Nitrite, UA Negative     Urobilinogen, UA 1.0 E.U./dL    Narrative:      Urine microscopic not indicated.             Imaging:    CT Abdomen Pelvis With Contrast    Result Date: 6/17/2023  PROCEDURE: CT ABDOMEN PELVIS W CONTRAST  COMPARISON: [facility], CT, CT ABDOMEN PELVIS W CONTRAST, 2/04/2022, 16:38.  INDICATIONS: GENERALIZED Abdominal pain  TECHNIQUE: After obtaining the patient's consent, CT images were created with non-ionic intravenous contrast material.   PROTOCOL:   Standard imaging protocol performed    RADIATION:   DLP: 534.7 mGy*cm   Automated exposure control was utilized to minimize radiation dose. CONTRAST: 75 cc Isovue 370 I.V.  FINDINGS:  Minimal density in the left lung base is consistent with scarring or atelectasis.  The liver, gallbladder, spleen, pancreas and adrenal glands appear unremarkable.  Arising from the right kidneys inferior pole is a 1.1 cm cyst.  In the mid left kidney is a 1.3 cm cyst.  There are sub cm hypodense foci elsewhere in the kidneys likely representing small cysts but too small for definitive characterization.  No adenopathy is seen in the abdomen or pelvis.  The urinary bladder is mildly distended but otherwise appears unremarkable.  Multiple pelvic calcifications are consistent with phleboliths.  No convincing ureteral stone is seen.   There is a small to moderate amount of stool in the large bowel.  The bowel otherwise appears unremarkable.  The lack of oral contrast limits evaluation of the bowel.  No hernia is identified.  The appendix is not well seen.  No overt inflammatory changes identified in the right lower quadrant.  There are compression fractures of L1 and T11 which are chronic and unchanged.  No acute fracture is seen.        1. Small to moderate amount of stool in the large bowel.  Correlate clinically for constipation. 2. Chronic T11 and L1 compression fractures 3. Multiple suspected bilateral renal cysts     Edward Hicks M.D.       Electronically Signed and Approved By: Edward Hicks M.D. on 6/17/2023 at 12:29                Differential Diagnosis and Discussion:      Abdominal Pain: Based on the patient's signs and symptoms, I considered abdominal aortic aneurysm, small bowel obstruction, pancreatitis, acute cholecystitis, acute appendecitis, peptic ulcer disease, gastritis, colitis, endocrine disorders, irritable bowel syndrome and other differential diagnosis an etiology of the patient's abdominal pain.    All labs were reviewed and interpreted by me.  CT scan radiology impression was interpreted by me.    MDM     Amount and/or Complexity of Data Reviewed  Clinical lab tests: reviewed  Tests in the radiology section of CPT®: reviewed             This patient is a pleasant 52-year-old male who has spinal cord injury and resulting paraplegia now presents with some difficulty urinating and foul-smelling urine as well as lower abdominal pain.    Vital signs are stable and no signs of sepsis are seen.    I consider acute cystitis possible versus urinary retention and I will do a bedside bladder ultrasound after he urinates some here.    We are sending off some lab work and urinalysis.    Fortunately his lactic acid is normal at 1.1 and white blood cell count was 6.2, which is reassuring and not indicative of any serious  infection.    Also, renal function and LFTs and lipase are all normal.      We will also get a CT scan of the abdomen pelvis for his abdominal pain work-up as he is somewhat difficult exam given paraplegia and some decrease sensation and I want to make sure he does not have appendicitis or kidney stone or something intra-abdominal process.          CT scan showed nothing serious or significant except for some mild to moderate constipation and mild distention of the urinary bladder.    It looks like he does have some bladder retention after the nurse performed bladder scan and we did place a Bajwa catheter and will leave it indwelling for now and have him follow-up with urology for his neurogenic bladder.    I also spoke to him about supportive care instructions for constipation.    At this time he looks stable to be discharged home.          Patient Care Considerations:          Consultants/Shared Management Plan:        Social Determinants of Health:    Patient is independent, reliable, and has access to care.       Disposition and Care Coordination:    Discharged: The patient is suitable and stable for discharge with no need for consideration of observation or admission.    I have explained the patient´s condition, diagnoses and treatment plan based on the information available to me at this time. I have answered questions and addressed any concerns. The patient has a good  understanding of the patient´s diagnosis, condition, and treatment plan as can be expected at this point. The vital signs have been stable. The patient´s condition is stable and appropriate for discharge from the emergency department.      The patient will pursue further outpatient evaluation with the primary care physician or other designated or consulting physician as outlined in the discharge instructions. They are agreeable to this plan of care and follow-up instructions have been explained in detail. The patient has received these  instructions in written format and have expressed an understanding of the discharge instructions. The patient is aware that any significant change in condition or worsening of symptoms should prompt an immediate return to this or the closest emergency department or call to 911.    Final diagnoses:   Acute bilateral lower abdominal pain   History of paraplegia   Acute urinary retention   Constipation, unspecified constipation type        ED Disposition       ED Disposition   Discharge    Condition   Stable    Comment   --               This medical record created using voice recognition software.             Freddie Conrad MD  06/17/23 [id number]
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
## 2023-08-08 ENCOUNTER — OFFICE VISIT (OUTPATIENT)
Dept: UROLOGY | Facility: CLINIC | Age: 53
End: 2023-08-08
Payer: COMMERCIAL

## 2023-08-08 VITALS — BODY MASS INDEX: 20.16 KG/M2 | RESPIRATION RATE: 16 BRPM | HEIGHT: 68 IN | WEIGHT: 133 LBS

## 2023-08-08 DIAGNOSIS — Z12.5 PROSTATE CANCER SCREENING: ICD-10-CM

## 2023-08-08 DIAGNOSIS — R33.9 URINARY RETENTION: Primary | ICD-10-CM

## 2023-08-08 DIAGNOSIS — R30.0 DYSURIA: ICD-10-CM

## 2023-08-08 LAB — URINE VOLUME: 102

## 2023-08-08 PROCEDURE — 1160F RVW MEDS BY RX/DR IN RCRD: CPT | Performed by: NURSE PRACTITIONER

## 2023-08-08 PROCEDURE — 99213 OFFICE O/P EST LOW 20 MIN: CPT | Performed by: NURSE PRACTITIONER

## 2023-08-08 PROCEDURE — 87086 URINE CULTURE/COLONY COUNT: CPT | Performed by: NURSE PRACTITIONER

## 2023-08-08 PROCEDURE — 1159F MED LIST DOCD IN RCRD: CPT | Performed by: NURSE PRACTITIONER

## 2023-08-08 RX ORDER — DESVENLAFAXINE SUCCINATE 50 MG/1
50 TABLET, EXTENDED RELEASE ORAL DAILY
COMMUNITY

## 2023-08-09 LAB — BACTERIA SPEC AEROBE CULT: ABNORMAL

## 2023-08-09 NOTE — PROGRESS NOTES
Please advise patient that his urine culture appears to be contaminated. We can have him come in for a cath specimen if he is having symptoms.

## 2023-08-10 ENCOUNTER — TELEPHONE (OUTPATIENT)
Dept: UROLOGY | Facility: CLINIC | Age: 53
End: 2023-08-10
Payer: COMMERCIAL

## 2023-08-10 NOTE — TELEPHONE ENCOUNTER
Called and left a  for patient letting them know that his sample was contaminated and needs to be recollected if he is still having UTI symptoms. I gave options of stopping by here today or tomorrow so that I can straight cath him and get a sample that way, or they can go to any [facility] lab and collect by bringing one of their straight caths and wife would straight cath him and get sample that way. Told them to call me back one way or another and let me know what they would like to do.

## 2023-10-19 ENCOUNTER — HOSPITAL ENCOUNTER (EMERGENCY)
Facility: HOSPITAL | Age: 53
Discharge: HOME | End: 2023-10-19
Attending: EMERGENCY MEDICINE
Payer: COMMERCIAL

## 2023-10-19 ENCOUNTER — APPOINTMENT (OUTPATIENT)
Dept: CT IMAGING | Facility: HOSPITAL | Age: 53
End: 2023-10-19
Payer: COMMERCIAL

## 2023-10-19 VITALS
WEIGHT: 133 LBS | OXYGEN SATURATION: 90 % | RESPIRATION RATE: 17 BRPM | TEMPERATURE: 98.8 F | SYSTOLIC BLOOD PRESSURE: 121 MMHG | HEART RATE: 63 BPM | HEIGHT: 68 IN | DIASTOLIC BLOOD PRESSURE: 80 MMHG | BODY MASS INDEX: 20.16 KG/M2

## 2023-10-19 DIAGNOSIS — N30.01 ACUTE CYSTITIS WITH HEMATURIA: Primary | ICD-10-CM

## 2023-10-19 LAB
ALBUMIN SERPL-MCNC: 4 G/DL (ref 3.5–5.2)
ALBUMIN/GLOB SERPL: 1.5 G/DL
ALP SERPL-CCNC: 77 U/L (ref 39–117)
ALT SERPL W P-5'-P-CCNC: 8 U/L (ref 1–41)
ANION GAP SERPL CALCULATED.3IONS-SCNC: 9 MMOL/L (ref 5–15)
AST SERPL-CCNC: 14 U/L (ref 1–40)
BACTERIA UR QL AUTO: ABNORMAL /HPF
BASOPHILS # BLD AUTO: 0.04 10*3/MM3 (ref 0–0.2)
BASOPHILS NFR BLD AUTO: 0.9 % (ref 0–1.5)
BILIRUB SERPL-MCNC: 0.4 MG/DL (ref 0–1.2)
BILIRUB UR QL STRIP: NEGATIVE
BUN SERPL-MCNC: 16 MG/DL (ref 6–20)
BUN/CREAT SERPL: 16.8 (ref 7–25)
CALCIUM SPEC-SCNC: 9.3 MG/DL (ref 8.6–10.5)
CHLORIDE SERPL-SCNC: 98 MMOL/L (ref 98–107)
CLARITY UR: ABNORMAL
CO2 SERPL-SCNC: 29 MMOL/L (ref 22–29)
COLOR UR: YELLOW
CREAT SERPL-MCNC: 0.95 MG/DL (ref 0.76–1.27)
D-LACTATE SERPL-SCNC: 1.1 MMOL/L (ref 0.5–2)
DEPRECATED RDW RBC AUTO: 43.2 FL (ref 37–54)
EGFRCR SERPLBLD CKD-EPI 2021: 95.1 ML/MIN/1.73
EOSINOPHIL # BLD AUTO: 0.06 10*3/MM3 (ref 0–0.4)
EOSINOPHIL NFR BLD AUTO: 1.4 % (ref 0.3–6.2)
ERYTHROCYTE [DISTWIDTH] IN BLOOD BY AUTOMATED COUNT: 13.6 % (ref 12.3–15.4)
GLOBULIN UR ELPH-MCNC: 2.7 GM/DL
GLUCOSE SERPL-MCNC: 87 MG/DL (ref 65–99)
GLUCOSE UR STRIP-MCNC: NEGATIVE MG/DL
HCT VFR BLD AUTO: 38.8 % (ref 37.5–51)
HGB BLD-MCNC: 13 G/DL (ref 13–17.7)
HGB UR QL STRIP.AUTO: NEGATIVE
HOLD SPECIMEN: NORMAL
HOLD SPECIMEN: NORMAL
HYALINE CASTS UR QL AUTO: ABNORMAL /LPF
IMM GRANULOCYTES # BLD AUTO: 0.01 10*3/MM3 (ref 0–0.05)
IMM GRANULOCYTES NFR BLD AUTO: 0.2 % (ref 0–0.5)
KETONES UR QL STRIP: NEGATIVE
LEUKOCYTE ESTERASE UR QL STRIP.AUTO: ABNORMAL
LIPASE SERPL-CCNC: 14 U/L (ref 13–60)
LYMPHOCYTES # BLD AUTO: 1.2 10*3/MM3 (ref 0.7–3.1)
LYMPHOCYTES NFR BLD AUTO: 27.6 % (ref 19.6–45.3)
MCH RBC QN AUTO: 29.3 PG (ref 26.6–33)
MCHC RBC AUTO-ENTMCNC: 33.5 G/DL (ref 31.5–35.7)
MCV RBC AUTO: 87.4 FL (ref 79–97)
MONOCYTES # BLD AUTO: 0.41 10*3/MM3 (ref 0.1–0.9)
MONOCYTES NFR BLD AUTO: 9.4 % (ref 5–12)
NEUTROPHILS NFR BLD AUTO: 2.62 10*3/MM3 (ref 1.7–7)
NEUTROPHILS NFR BLD AUTO: 60.5 % (ref 42.7–76)
NITRITE UR QL STRIP: NEGATIVE
NRBC BLD AUTO-RTO: 0 /100 WBC (ref 0–0.2)
PH UR STRIP.AUTO: 7.5 [PH] (ref 5–8)
PLATELET # BLD AUTO: 102 10*3/MM3 (ref 140–450)
PMV BLD AUTO: 10.5 FL (ref 6–12)
POTASSIUM SERPL-SCNC: 3.7 MMOL/L (ref 3.5–5.2)
PROT SERPL-MCNC: 6.7 G/DL (ref 6–8.5)
PROT UR QL STRIP: NEGATIVE
RBC # BLD AUTO: 4.44 10*6/MM3 (ref 4.14–5.8)
RBC # UR STRIP: ABNORMAL /HPF
REF LAB TEST METHOD: ABNORMAL
SODIUM SERPL-SCNC: 136 MMOL/L (ref 136–145)
SP GR UR STRIP: 1.01 (ref 1–1.03)
SQUAMOUS #/AREA URNS HPF: ABNORMAL /HPF
UROBILINOGEN UR QL STRIP: ABNORMAL
WBC # UR STRIP: ABNORMAL /HPF
WBC NRBC COR # BLD: 4.34 10*3/MM3 (ref 3.4–10.8)
WHOLE BLOOD HOLD COAG: NORMAL
WHOLE BLOOD HOLD SPECIMEN: NORMAL

## 2023-10-19 PROCEDURE — 25510000001 IOPAMIDOL PER 1 ML: Performed by: PHYSICIAN ASSISTANT

## 2023-10-19 PROCEDURE — 80053 COMPREHEN METABOLIC PANEL: CPT

## 2023-10-19 PROCEDURE — 96375 TX/PRO/DX INJ NEW DRUG ADDON: CPT

## 2023-10-19 PROCEDURE — 25010000002 KETOROLAC TROMETHAMINE PER 15 MG: Performed by: EMERGENCY MEDICINE

## 2023-10-19 PROCEDURE — 96365 THER/PROPH/DIAG IV INF INIT: CPT

## 2023-10-19 PROCEDURE — 87077 CULTURE AEROBIC IDENTIFY: CPT | Performed by: PHYSICIAN ASSISTANT

## 2023-10-19 PROCEDURE — 99285 EMERGENCY DEPT VISIT HI MDM: CPT

## 2023-10-19 PROCEDURE — 83690 ASSAY OF LIPASE: CPT

## 2023-10-19 PROCEDURE — 25810000003 SODIUM CHLORIDE 0.9 % SOLUTION: Performed by: EMERGENCY MEDICINE

## 2023-10-19 PROCEDURE — 83605 ASSAY OF LACTIC ACID: CPT

## 2023-10-19 PROCEDURE — 85025 COMPLETE CBC W/AUTO DIFF WBC: CPT

## 2023-10-19 PROCEDURE — 87186 SC STD MICRODIL/AGAR DIL: CPT | Performed by: PHYSICIAN ASSISTANT

## 2023-10-19 PROCEDURE — 25010000002 CEFTRIAXONE PER 250 MG: Performed by: EMERGENCY MEDICINE

## 2023-10-19 PROCEDURE — 36415 COLL VENOUS BLD VENIPUNCTURE: CPT

## 2023-10-19 PROCEDURE — 87086 URINE CULTURE/COLONY COUNT: CPT | Performed by: PHYSICIAN ASSISTANT

## 2023-10-19 PROCEDURE — 81001 URINALYSIS AUTO W/SCOPE: CPT

## 2023-10-19 PROCEDURE — 74177 CT ABD & PELVIS W/CONTRAST: CPT

## 2023-10-19 RX ORDER — SODIUM CHLORIDE 0.9 % (FLUSH) 0.9 %
10 SYRINGE (ML) INJECTION AS NEEDED
Status: DISCONTINUED | OUTPATIENT
Start: 2023-10-19 | End: 2023-10-19 | Stop reason: HOSPADM

## 2023-10-19 RX ORDER — CETIRIZINE HYDROCHLORIDE 10 MG/1
TABLET ORAL EVERY 24 HOURS
COMMUNITY

## 2023-10-19 RX ORDER — CEPHALEXIN 500 MG/1
500 CAPSULE ORAL 4 TIMES DAILY
Qty: 28 CAPSULE | Refills: 0 | Status: SHIPPED | OUTPATIENT
Start: 2023-10-19

## 2023-10-19 RX ORDER — CEFTRIAXONE SODIUM 1 G/50ML
1000 INJECTION, SOLUTION INTRAVENOUS ONCE
Status: COMPLETED | OUTPATIENT
Start: 2023-10-19 | End: 2023-10-19

## 2023-10-19 RX ORDER — KETOROLAC TROMETHAMINE 15 MG/ML
15 INJECTION, SOLUTION INTRAMUSCULAR; INTRAVENOUS ONCE
Status: COMPLETED | OUTPATIENT
Start: 2023-10-19 | End: 2023-10-19

## 2023-10-19 RX ORDER — PHENAZOPYRIDINE HYDROCHLORIDE 200 MG/1
200 TABLET, FILM COATED ORAL 3 TIMES DAILY PRN
Qty: 6 TABLET | Refills: 0 | Status: SHIPPED | OUTPATIENT
Start: 2023-10-19

## 2023-10-19 RX ORDER — SUCRALFATE 1 G/1
TABLET ORAL EVERY 12 HOURS SCHEDULED
COMMUNITY

## 2023-10-19 RX ADMIN — CEFTRIAXONE SODIUM 1000 MG: 1 INJECTION, SOLUTION INTRAVENOUS at 17:56

## 2023-10-19 RX ADMIN — IOPAMIDOL 75 ML: 755 INJECTION, SOLUTION INTRAVENOUS at 15:59

## 2023-10-19 RX ADMIN — SODIUM CHLORIDE 1000 ML: 9 INJECTION, SOLUTION INTRAVENOUS at 17:56

## 2023-10-19 RX ADMIN — KETOROLAC TROMETHAMINE 15 MG: 15 INJECTION, SOLUTION INTRAMUSCULAR; INTRAVENOUS at 17:56

## 2023-10-19 NOTE — ED PROVIDER NOTES
Time: 2:03 PM EDT  Date of encounter:  10/19/2023  Independent Historian/Clinical History and Information was obtained by:   Patient    History is limited by: N/A    Chief Complaint   Patient presents with    Blood in Urine         History of Present Illness:  Patient is a 52 y.o. year old male who presents to the emergency department for evaluation of lower abdominal pain that started 1 year ago.  Patient states blood in urine since last night brings him to the emergency department today.  Denies pain over upper abdomen.  States pain is suprapubic.  The patient has had no fever and no vomiting.  He states that he had significant hematuria which is now resolved.  Patient has a history of spinal cord injury, but is able to urinate without self cath. (RAMANA Kelly Provider in Triage)     Patient Care Team  Primary Care Provider: Ronen Ohara MD    Past Medical History:     Allergies   Allergen Reactions    Varenicline Other (See Comments)     dizziness     Past Medical History:   Diagnosis Date    Anxiety     Spinal cord injury at C1-C4 level without spinal injury      Past Surgical History:   Procedure Laterality Date    BACK SURGERY      C2-7 Fusion     History reviewed. No pertinent family history.    Home Medications:  Prior to Admission medications    Medication Sig Start Date End Date Taking? Authorizing Provider   baclofen (LIORESAL) 20 MG tablet baclofen 20 mg oral tablet take 1 tablet by oral route 4 times a day   Active    Provider, MD José Antonio   busPIRone (BUSPAR) 15 MG tablet buspirone 15 mg oral tablet take 1 tablet (15 mg) by oral route 2 times per day   Active    Provider, MD José Antonio   desvenlafaxine (PRISTIQ) 50 MG 24 hr tablet Take 1 tablet by mouth Daily.    Provider, MD José Antonio   diazePAM (VALIUM) 10 MG tablet Valium 10 mg oral tablet take 1 tablet 1 hr prior to MRI, repeat 30 min prior to MRI prn.   Active  Patient not taking: Reported on 7/11/2023    Provider,  "MD José Antonio   fluticasone (FLONASE) 50 MCG/ACT nasal spray Daily.    ProviderJosé Antonio MD   hydrOXYzine (ATARAX) 50 MG tablet hydroxyzine HCl 50 mg oral tablet take 1 tablet by oral route daily   Active    José Antonio Esposito MD   ibuprofen (ADVIL,MOTRIN) 800 MG tablet Take 1 tablet by mouth 2 (Two) Times a Day With Meals. 12/29/21   José Antonio Esposito MD   omeprazole (priLOSEC) 40 MG capsule Take 1 capsule by mouth Daily. 12/29/21   José Antonio Esposito MD   pantoprazole (PROTONIX) 40 MG EC tablet Daily.    José Antonio Esposito MD   QUEtiapine Fumarate (SEROQUEL PO) Take  by mouth.    José Antonio Esposito MD   tamsulosin (FLOMAX) 0.4 MG capsule 24 hr capsule Take 1 capsule by mouth Daily for 360 days. 7/11/23 7/5/24  Rosey Fallon APRN   traZODone (DESYREL) 100 MG tablet 1 tablet every night at bedtime.    ProviderJosé Antonio MD        Social History:   Social History     Tobacco Use    Smoking status: Every Day     Packs/day: 1     Types: Cigarettes     Passive exposure: Current    Smokeless tobacco: Never   Vaping Use    Vaping Use: Never used   Substance Use Topics    Alcohol use: Not Currently    Drug use: Yes     Comment: fentanyl powder \"off the street\" for managing pain         Review of Systems:  Review of Systems   Gastrointestinal:  Positive for abdominal pain. Negative for diarrhea, nausea and vomiting.   Genitourinary:  Positive for hematuria. Negative for difficulty urinating.        Physical Exam:  /80   Pulse 63   Temp 98.8 °F (37.1 °C) (Oral)   Resp 17   Ht 172.7 cm (68\")   Wt 60.3 kg (133 lb)   SpO2 90%   BMI 20.22 kg/m²         Physical Exam  Vitals and nursing note reviewed.   Constitutional:       General: He is not in acute distress.     Appearance: Normal appearance. He is not toxic-appearing.   HENT:      Head: Normocephalic and atraumatic.      Nose: Nose normal.      Mouth/Throat:      Mouth: Mucous membranes are moist.   Eyes:      General: No " scleral icterus.     Conjunctiva/sclera: Conjunctivae normal.   Cardiovascular:      Rate and Rhythm: Normal rate and regular rhythm.      Pulses: Normal pulses.      Heart sounds: Normal heart sounds.   Pulmonary:      Effort: Pulmonary effort is normal. No respiratory distress.      Breath sounds: Normal breath sounds.   Abdominal:      General: Abdomen is flat.      Palpations: Abdomen is soft. There is no mass.      Tenderness: There is abdominal tenderness. There is no guarding or rebound.      Comments: Mild suprapubic tenderness   Musculoskeletal:         General: Normal range of motion.      Cervical back: Normal range of motion and neck supple.   Skin:     General: Skin is warm and dry.   Neurological:      Mental Status: He is alert and oriented to person, place, and time. Mental status is at baseline.   Psychiatric:         Mood and Affect: Mood normal.         Behavior: Behavior normal.         Thought Content: Thought content normal.         Judgment: Judgment normal.                      Procedures:  Procedures      Medical Decision Making:      Comorbidities that affect care:    Prior spinal cord injury    External Notes reviewed:    Previous Clinic Note: Urology visit for urinary retention      The following orders were placed and all results were independently analyzed by me:  Orders Placed This Encounter   Procedures    Urine Culture - Urine, Urine, Clean Catch    CT Abdomen Pelvis With Contrast    [place] Draw    Comprehensive Metabolic Panel    Lipase    Urinalysis With Microscopic If Indicated (No Culture) - Urine, Clean Catch    Lactic Acid, Plasma    CBC Auto Differential    Urinalysis, Microscopic Only - Urine, Clean Catch    Undress & Gown    CBC & Differential    Green Top (Gel)    Lavender Top    Gold Top - SST    Light Blue Top       Medications Given in the Emergency Department:  Medications   iopamidol (ISOVUE-370) 76 % injection 100 mL (75 mL Intravenous Given 10/19/23 9394)   sodium  chloride 0.9 % bolus 1,000 mL (0 mL Intravenous Stopped 10/19/23 1858)   ketorolac (TORADOL) injection 15 mg (15 mg Intravenous Given 10/19/23 1756)   cefTRIAXone (ROCEPHIN) IVPB 1,000 mg (0 mg Intravenous Stopped 10/19/23 1849)        ED Course:    The patient was initially evaluated in the triage area where orders were placed. The patient was later dispositioned by Castro Senior DO.      The patient was advised to stay for completion of workup which includes but is not limited to communication of labs and radiological results, reassessment and plan. The patient was advised that leaving prior to disposition by a provider could result in critical findings that are not communicated to the patient.          Labs:    Results for orders placed or performed during the hospital encounter of 10/19/23   Urine Culture - Urine, Urine, Clean Catch    Specimen: Urine, Clean Catch   Result Value Ref Range    Urine Culture Culture in progress    Comprehensive Metabolic Panel    Specimen: Arm, Right; Blood   Result Value Ref Range    Glucose 87 65 - 99 mg/dL    BUN 16 6 - 20 mg/dL    Creatinine 0.95 0.76 - 1.27 mg/dL    Sodium 136 136 - 145 mmol/L    Potassium 3.7 3.5 - 5.2 mmol/L    Chloride 98 98 - 107 mmol/L    CO2 29.0 22.0 - 29.0 mmol/L    Calcium 9.3 8.6 - 10.5 mg/dL    Total Protein 6.7 6.0 - 8.5 g/dL    Albumin 4.0 3.5 - 5.2 g/dL    ALT (SGPT) 8 1 - 41 U/L    AST (SGOT) 14 1 - 40 U/L    Alkaline Phosphatase 77 39 - 117 U/L    Total Bilirubin 0.4 0.0 - 1.2 mg/dL    Globulin 2.7 gm/dL    A/G Ratio 1.5 g/dL    BUN/Creatinine Ratio 16.8 7.0 - 25.0    Anion Gap 9.0 5.0 - 15.0 mmol/L    eGFR 95.1 >60.0 mL/min/1.73   Lipase    Specimen: Arm, Right; Blood   Result Value Ref Range    Lipase 14 13 - 60 U/L   Urinalysis With Microscopic If Indicated (No Culture) - Urine, Clean Catch    Specimen: Urine, Clean Catch   Result Value Ref Range    Color, UA Yellow Yellow, Straw    Appearance, UA Turbid (A) Clear    pH, UA 7.5 5.0 -  8.0    Specific Gravity, UA 1.013 1.005 - 1.030    Glucose, UA Negative Negative    Ketones, UA Negative Negative    Bilirubin, UA Negative Negative    Blood, UA Negative Negative    Protein, UA Negative Negative    Leuk Esterase, UA Large (3+) (A) Negative    Nitrite, UA Negative Negative    Urobilinogen, UA 1.0 E.U./dL 0.2 - 1.0 E.U./dL   Lactic Acid, Plasma    Specimen: Arm, Right; Blood   Result Value Ref Range    Lactate 1.1 0.5 - 2.0 mmol/L   CBC Auto Differential    Specimen: Arm, Right; Blood   Result Value Ref Range    WBC 4.34 3.40 - 10.80 10*3/mm3    RBC 4.44 4.14 - 5.80 10*6/mm3    Hemoglobin 13.0 13.0 - 17.7 g/dL    Hematocrit 38.8 37.5 - 51.0 %    MCV 87.4 79.0 - 97.0 fL    MCH 29.3 26.6 - 33.0 pg    MCHC 33.5 31.5 - 35.7 g/dL    RDW 13.6 12.3 - 15.4 %    RDW-SD 43.2 37.0 - 54.0 fl    MPV 10.5 6.0 - 12.0 fL    Platelets 102 (L) 140 - 450 10*3/mm3    Neutrophil % 60.5 42.7 - 76.0 %    Lymphocyte % 27.6 19.6 - 45.3 %    Monocyte % 9.4 5.0 - 12.0 %    Eosinophil % 1.4 0.3 - 6.2 %    Basophil % 0.9 0.0 - 1.5 %    Immature Grans % 0.2 0.0 - 0.5 %    Neutrophils, Absolute 2.62 1.70 - 7.00 10*3/mm3    Lymphocytes, Absolute 1.20 0.70 - 3.10 10*3/mm3    Monocytes, Absolute 0.41 0.10 - 0.90 10*3/mm3    Eosinophils, Absolute 0.06 0.00 - 0.40 10*3/mm3    Basophils, Absolute 0.04 0.00 - 0.20 10*3/mm3    Immature Grans, Absolute 0.01 0.00 - 0.05 10*3/mm3    nRBC 0.0 0.0 - 0.2 /100 WBC   Urinalysis, Microscopic Only - Urine, Clean Catch    Specimen: Urine, Clean Catch   Result Value Ref Range    RBC, UA 6-10 (A) None Seen, 0-2 /HPF    WBC, UA Too Numerous to Count (A) None Seen, 0-2 /HPF    Bacteria, UA 4+ (A) None Seen /HPF    Squamous Epithelial Cells, UA 0-2 None Seen, 0-2 /HPF    Hyaline Casts, UA 0-2 None Seen /LPF    Methodology Automated Microscopy    Green Top (Gel)   Result Value Ref Range    Extra Tube Hold for add-ons.    Lavender Top   Result Value Ref Range    Extra Tube hold for add-on    Gold Top - SST    Result Value Ref Range    Extra Tube Hold for add-ons.    Light Blue Top   Result Value Ref Range    Extra Tube Hold for add-ons.          Lab Results (last 24 hours)       ** No results found for the last 24 hours. **             Imaging:    CT Abdomen Pelvis With Contrast    Result Date: 10/19/2023  Narrative: PROCEDURE: CT ABDOMEN PELVIS W CONTRAST  COMPARISON: [facility], CT, CT ABDOMEN PELVIS W CONTRAST, 6/17/2023, 11:22.  INDICATIONS: abdominal pain, hematuria at home x1 day, hx spinal cord injury  TECHNIQUE: After obtaining the patient's consent, CT images were created with non-ionic intravenous contrast material.   PROTOCOL:   Standard imaging protocol performed    RADIATION:   DLP: 426.1 mGy*cm   Automated exposure control was utilized to minimize radiation dose. CONTRAST: 75 cc Isovue 370 I.V. LABS:   eGFR: 95.1 ml/min/1.73m2  FINDINGS:  The lung bases are stable.  There is mild atelectasis.  There is dystrophic soft tissue calcifications in the right flank soft tissues.  There is mild edema in the soft tissues.  There is mild intra and extrahepatic biliary dilation.  Extrahepatic bile duct measures up to about 0.8 centimeters.  The gallbladder demonstrates no acute finding.  Spleen appears similar to previous.  No acute pancreatic finding.  No adrenal finding.  There are several bilateral renal cysts.  There is no obstructive uropathy.  Ureters are difficult to follow due to lack of intra-abdominal fat.  There are multiple phleboliths.  There is trabeculations involving the bladder wall.  Bladder is mildly distended There is a fold or septation in the bladder.  Portions the bladder wall mildly thickened.  No significant inflammation.  Bladder is mildly distended.  There is echogenic material seen with lumen of the dependent bladder measuring 3.4 x 1.6 centimeter.  There is an irregular contour of the bladder.  There is moderate to large amount of stool throughout the colon.  There is no  inflammatory process.  Appendix not well visualized not clearly abnormal.  Mild atherosclerosis.  No enlarged adenopathy.  Mild Prostatomegaly slightly impinging on the bladder base.  Mild levocurvature in the spine; there is a mild compression deformity of L2 is stable.  There is mild to moderate compression deformity of L1.  Mild compression deformity of T11 and T10 also appears similar.  Degenerative changes seen in the lumbar spine.  A trace amount of pelvic ascites.      Impression:   1. No renal calculi.  No obstructive uropathy.  Ureters are difficult to follow due to lack of intra-abdominal fat.  There is multiple phleboliths in the pelvis. 2. 3.4 x 1.6 centimeter collect of echogenic debris or cluster of tiny stones in the dependent left aspect of the bladder.  This appears new compared to prior CT 6/17/2023.  3. Bladder is mildly distended.  There are multiple bladder trabeculations which appear new.  Portions of the bladder wall could be mildly thickened.  Correlate with urinalysis to exclude cystitis.  Follow-up cystoscopy may be useful. 4. Trace pelvic ascites. 5. Moderate to large amount of stool throughout the colon. 6. There is mild intra and extrahepatic biliary dilation.  No cholelithiasis.  Correlate with history and lab values and follow-up with MRCP if indicated clinically.  Findings are similar to prior CT scan. 7. Multiple compression deformities in the lower thoracic and lumbar spine greatest at L1 similar to prior CT 6/17/2023 likely old compression fractures.     SHAYNA GOTTI MD       Electronically Signed and Approved By: SHAYNA GOTTI MD on 10/19/2023 at 16:40                No Radiology Exams Resulted Within Past 24 Hours      Differential Diagnosis and Discussion:      Hematuria: Differential diagnosis includes but is not limited to medications, coagulopathy, glomerulonephritis, nephritis, neoplasm, vascular abnormalities, cystitis, urethritis, neoplasms of the bladder, and  autoimmune disorders.    All labs were reviewed and interpreted by me.    MDM     Amount and/or Complexity of Data Reviewed  Clinical lab tests: reviewed  Tests in the radiology section of CPT®: reviewed             Patient Care Considerations:    CHEST X-RAY: I considered ordering a chest x-ray however the patient has no pulmonary symptoms.      Consultants/Shared Management Plan:    None    Social Determinants of Health:    Patient is independent, reliable, and has access to care.       Disposition and Care Coordination:    Discharged: The patient is suitable and stable for discharge with no need for consideration of observation or admission.    I have explained discharge medications and the need for follow up with the patient/caretakers. This was also printed in the discharge instructions. Patient was discharged with the following medications and follow up:      Medication List        New Prescriptions      cephalexin 500 MG capsule  Commonly known as: KEFLEX  Take 1 capsule by mouth 4 (Four) Times a Day.     phenazopyridine 200 MG tablet  Commonly known as: PYRIDIUM  Take 1 tablet by mouth 3 (Three) Times a Day As Needed for Bladder Spasms.               Where to Get Your Medications        These medications were sent to [id number] - [place] [id number] [facility] AT [facility] OF [address] & [place] - [phone]  - [phone] FX  [address] [facility] [address]      Phone: [phone]   cephalexin 500 MG capsule  phenazopyridine 200 MG tablet      Deirdre Lee MD  [address]  [place] [address] [phone]    In 1 week         Final diagnoses:   Acute cystitis with hematuria        ED Disposition       ED Disposition   Discharge    Condition   Stable    Comment   --               This medical record created using voice recognition software.             Castro Senior,   10/20/23 5666

## 2023-10-19 NOTE — DISCHARGE INSTRUCTIONS
Plenty of fluids.  Take medication as directed.  Return for worsening symptoms.  Follow-up with urology next week.

## 2023-10-22 ENCOUNTER — TELEPHONE (OUTPATIENT)
Dept: EMERGENCY DEPT | Facility: HOSPITAL | Age: 53
End: 2023-10-22
Payer: COMMERCIAL

## 2023-10-22 LAB
BACTERIA SPEC AEROBE CULT: ABNORMAL
BACTERIA SPEC AEROBE CULT: ABNORMAL

## 2023-10-22 RX ORDER — SULFAMETHOXAZOLE AND TRIMETHOPRIM 800; 160 MG/1; MG/1
1 TABLET ORAL 2 TIMES DAILY
Qty: 14 TABLET | Refills: 0 | Status: SHIPPED | OUTPATIENT
Start: 2023-10-22 | End: 2023-10-29

## 2023-11-01 ENCOUNTER — LAB (OUTPATIENT)
Dept: LAB | Facility: HOSPITAL | Age: 53
End: 2023-11-01
Payer: COMMERCIAL

## 2023-11-01 DIAGNOSIS — Z12.5 PROSTATE CANCER SCREENING: ICD-10-CM

## 2023-11-01 LAB — PSA SERPL-MCNC: 1.4 NG/ML (ref 0–4)

## 2023-11-01 PROCEDURE — G0103 PSA SCREENING: HCPCS

## 2023-11-01 PROCEDURE — 36415 COLL VENOUS BLD VENIPUNCTURE: CPT

## 2023-11-07 NOTE — PROGRESS NOTES
"Chief Complaint: Urinary Retention    Subjective         History of Present Illness  Haider Jacobs is a 52 y.o. male presents to [facility] to be seen for follow-up.    Patient was previously seen by me with last visit on 8/8/2023 for BPH/urinary retention.  We did discuss at that visit that he needs to set an alarm to void at least every 3 hours to ensure he is emptying his bladder well.  We also discussed that he did need to get his PSA prior to this appointment.  His PVR at that visit was 102.  He previously had done CIC so is well versed with doing this as needed.  Patient was seen in the ER on 10/19/2023 for blood in his urine.  He did have a CT scan completed at that visit.  He is here for follow-up.      He states he is doing better with urination since starting on tamsulosin. He does not get the sensation to void but does \"bladder tapping\" to help him void. He does reports he wears depends at night and it is full in the morning. He is not doing timed voiding.         PROCEDURE:  CT ABDOMEN PELVIS W CONTRAST     COMPARISON: [facility], CT, CT ABDOMEN PELVIS W CONTRAST, 6/17/2023, 11:22.     INDICATIONS:  abdominal pain, hematuria at home x1 day, hx spinal cord injury     TECHNIQUE:    After obtaining the patient's consent, CT images were created with non-ionic intravenous   contrast material.       PROTOCOL:     Standard imaging protocol performed                 RADIATION:      DLP: 426.1 mGy*cm               Automated exposure control was utilized to minimize radiation dose.   CONTRAST:      75 cc Isovue 370 I.V.  LABS:   eGFR: 95.1 ml/min/1.73m2     FINDINGS:          The lung bases are stable.  There is mild atelectasis.  There is dystrophic soft tissue   calcifications in the right flank soft tissues.  There is mild edema in the soft tissues.  There is   mild intra and extrahepatic biliary dilation.  Extrahepatic bile duct measures up to about 0.8   centimeters.  " The gallbladder demonstrates no acute finding.  Spleen appears similar to previous.    No acute pancreatic finding.  No adrenal finding.  There are several bilateral renal cysts.  There   is no obstructive uropathy.  Ureters are difficult to follow due to lack of intra-abdominal fat.      There are multiple phleboliths.  There is trabeculations involving the bladder wall.  Bladder is   mildly distended There is a fold or septation in the bladder.  Portions the bladder wall mildly   thickened.  No significant inflammation.  Bladder is mildly distended.  There is echogenic material   seen with lumen of the dependent bladder measuring 3.4 x 1.6 centimeter.  There is an irregular   contour of the bladder.  There is moderate to large amount of stool throughout the colon.  There is   no inflammatory process.  Appendix not well visualized not clearly abnormal.  Mild atherosclerosis.    No enlarged adenopathy.  Mild Prostatomegaly slightly impinging on the bladder base.  Mild   levocurvature in the spine; there is a mild compression deformity of L2 is stable.  There is mild   to moderate compression deformity of L1.  Mild compression deformity of T11 and T10 also appears   similar.  Degenerative changes seen in the lumbar spine.  A trace amount of pelvic ascites.     IMPRESSION:                 1. No renal calculi.  No obstructive uropathy.  Ureters are difficult to follow due to lack of   intra-abdominal fat.  There is multiple phleboliths in the pelvis.  2. 3.4 x 1.6 centimeter collect of echogenic debris or cluster of tiny stones in the dependent left   aspect of the bladder.  This appears new compared to prior CT 6/17/2023.    3. Bladder is mildly distended.  There are multiple bladder trabeculations which appear new.    Portions of the bladder wall could be mildly thickened.  Correlate with urinalysis to exclude   cystitis.  Follow-up cystoscopy may be useful.  4. Trace pelvic ascites.  5. Moderate to large amount of  stool throughout the colon.  6. There is mild intra and extrahepatic biliary dilation.  No cholelithiasis.  Correlate with   history and lab values and follow-up with MRCP if indicated clinically.  Findings are similar to   prior CT scan.  7. Multiple compression deformities in the lower thoracic and lumbar spine greatest at L1 similar   to prior CT 6/17/2023 likely old compression fractures.            SHAYNA GOTTI MD         Electronically Signed and Approved By: SHAYNA GOTTI MD on 10/19/2023 at 16:40       PSA  11/1/2023 1.4    Urine culture  10/19/2023 greater than 100,000 colony-forming's per mL Morganella Morgagni, resistant to ampicillin, Unasyn, cefazolin, nitrofurantoin, Zosyn otherwise sensitive; #2 greater than 100,000 colony-forming's per mL Klebsiella oxytoca resistant to ampicillin, intermediate to Unasyn, otherwise sensitive  8/8/2023 greater than 100 Thousand colony-forming units per mL mixed yady    UA with microscopy  10/19/2023 RBCs 6-10 per high-powered field, WBCs too numerous to count, bacteria 4+        Previous 8/8/2023:   Patient was previously seen by me with last visit on 7/11/2023 for BPH/urinary retention.  We did start him on tamsulosin and had him come back 1 week later for a void trial he is here for follow-up. He reports tamsulosin is helping with his symptoms. He reports he is going more frequently during the night since starting tamsulosin. He does report he is having some bladder spasms, burning with urination and foul odor to urine.      Previous 7/11/2023:  Patient was seen in the ER on 6/17/2023 with lower abdominal pain and foul-smelling urine.  Patient is paraplegic from spinal cord injury and has difficulty with urination but does not require self-catheterization.Patient was sent home with a Jaquez catheter and he is here for urinary retention follow-up.     Patient reports he was having a lot of bladder pain. He was seen in ER and was treated with a jaquez  catheter. He reports he is still having bladder pain when he touches his lower abdomen and when laying on back. He reports he was able to void prior to having the catheter placed. He reports it took 3 tries to get the catheter placed. He reports he was having burning with urination and urinary frequency. He reports he was tested for UTI in the ER and was negative. He reports that he does have urethral pain as well that started prior to having the jaquez catheter placed. He did have to do CIC after he had his accident which caused him to be paraplegic.         CT Abdomen Pelvis With Contrast     Result Date: 6/17/2023  PROCEDURE:            CT ABDOMEN PELVIS W CONTRAST  COMPARISON:    [facility], CT, CT ABDOMEN PELVIS W CONTRAST, 2/04/2022, 16:38.  INDICATIONS:          GENERALIZED Abdominal pain  TECHNIQUE:         After obtaining the patient's consent, CT images were created with non-ionic intravenous contrast material.   PROTOCOL:             Standard imaging protocol performed    RADIATION:              DLP: 534.7 mGy*cm   Automated exposure control was utilized to minimize radiation dose. CONTRAST:     75 cc Isovue 370 I.V.  FINDINGS:   Minimal density in the left lung base is consistent with scarring or atelectasis.  The liver, gallbladder, spleen, pancreas and adrenal glands appear unremarkable.  Arising from the right kidneys inferior pole is a 1.1 cm cyst.  In the mid left kidney is a 1.3 cm cyst.  There are sub cm hypodense foci elsewhere in the kidneys likely representing small cysts but too small for definitive characterization.  No adenopathy is seen in the abdomen or pelvis.  The urinary bladder is mildly distended but otherwise appears unremarkable.  Multiple pelvic calcifications are consistent with phleboliths.  No convincing ureteral stone is seen.  There is a small to moderate amount of stool in the large bowel.  The bowel otherwise appears unremarkable.  The lack of oral contrast  limits evaluation of the bowel.  No hernia is identified.  The appendix is not well seen.  No overt inflammatory changes identified in the right lower quadrant.  There are compression fractures of L1 and T11 which are chronic and unchanged.  No acute fracture is seen.                    1. Small to moderate amount of stool in the large bowel.  Correlate clinically for constipation. 2. Chronic T11 and L1 compression fractures 3. Multiple suspected bilateral renal cysts     Edward Hicks M.D.       Electronically Signed and Approved By: Edward Hicks M.D. on 6/17/2023 at 12:29                 Objective     Past Medical History:   Diagnosis Date    Anxiety     Spinal cord injury at C1-C4 level without spinal injury        Past Surgical History:   Procedure Laterality Date    BACK SURGERY      C2-7 Fusion         Current Outpatient Medications:     baclofen (LIORESAL) 20 MG tablet, baclofen 20 mg oral tablet take 1 tablet by oral route 4 times a day   Active, Disp: , Rfl:     busPIRone (BUSPAR) 15 MG tablet, buspirone 15 mg oral tablet take 1 tablet (15 mg) by oral route 2 times per day   Active, Disp: , Rfl:     cephalexin (KEFLEX) 500 MG capsule, Take 1 capsule by mouth 4 (Four) Times a Day., Disp: 28 capsule, Rfl: 0    cetirizine (zyrTEC) 10 MG tablet, Daily., Disp: , Rfl:     desvenlafaxine (PRISTIQ) 50 MG 24 hr tablet, Take 1 tablet by mouth Daily., Disp: , Rfl:     fluticasone (FLONASE) 50 MCG/ACT nasal spray, Daily., Disp: , Rfl:     ibuprofen (ADVIL,MOTRIN) 800 MG tablet, Take 1 tablet by mouth 2 (Two) Times a Day With Meals., Disp: , Rfl:     omeprazole (priLOSEC) 40 MG capsule, Take 1 capsule by mouth Daily., Disp: , Rfl:     phenazopyridine (PYRIDIUM) 200 MG tablet, Take 1 tablet by mouth 3 (Three) Times a Day As Needed for Bladder Spasms., Disp: 6 tablet, Rfl: 0    QUEtiapine Fumarate (SEROQUEL PO), Take  by mouth., Disp: , Rfl:     sucralfate (CARAFATE) 1 g tablet, Every 12 (Twelve) Hours., Disp: , Rfl:      "tamsulosin (FLOMAX) 0.4 MG capsule 24 hr capsule, Take 1 capsule by mouth Daily for 360 days., Disp: 90 capsule, Rfl: 3    traZODone (DESYREL) 100 MG tablet, 1 tablet every night at bedtime., Disp: , Rfl:     diazePAM (VALIUM) 10 MG tablet, Valium 10 mg oral tablet take 1 tablet 1 hr prior to MRI, repeat 30 min prior to MRI prn.   Active (Patient not taking: Reported on 7/11/2023), Disp: , Rfl:     Allergies   Allergen Reactions    Varenicline Other (See Comments)     dizziness        No family history on file.    Social History     Socioeconomic History    Marital status:    Tobacco Use    Smoking status: Every Day     Packs/day: 1     Types: Cigarettes     Passive exposure: Current    Smokeless tobacco: Never   Vaping Use    Vaping Use: Never used   Substance and Sexual Activity    Alcohol use: Not Currently    Drug use: Yes     Comment: fentanyl powder \"off the street\" for managing pain    Sexual activity: Defer       Vital Signs:   /63 (BP Location: Left arm, Patient Position: Sitting, Cuff Size: Adult)   Pulse 57   Ht 172.7 cm (68\")   Wt 60.3 kg (132 lb 15 oz)   BMI 20.21 kg/m²      Physical Exam  Vitals reviewed.   Constitutional:       Appearance: Normal appearance.   Neurological:      General: No focal deficit present.      Mental Status: He is alert and oriented to person, place, and time.   Psychiatric:         Mood and Affect: Mood normal.         Behavior: Behavior normal.          Result Review :   The following data was reviewed by: RAMANA Pendleton on 11/08/2023:  Results for orders placed or performed in visit on 11/01/23   PSA Screen    Specimen: Arm, Right; Blood   Result Value Ref Range    PSA 1.400 0.000 - 4.000 ng/mL      PSA          11/1/2023    11:21   PSA   PSA 1.400        Bladder Scan interpretation 11/08/2023    Estimation of residual urine via BVI 3000 Verathon Bladder Scan  MA/nurse performing: Cielo IGLESIAS MA  Residual Urine: 171 ml  Indication: Urinary " retention   Position: Supine  Examination: Incremental scanning of the suprapubic area using 2.0 MHz transducer using copious amounts of acoustic gel.   Findings: An anechoic area was demonstrated which represented the bladder, with measurement of residual urine as noted. I inspected this myself. In that the residual urine was  insignificant, refer to plan for treatment and plan necessary at this time.           Procedures        Assessment and Plan    Diagnoses and all orders for this visit:    1. Urinary retention (Primary)  -     Bladder Scan    Advised timed voiding, advising him that he should try to void during the day at least every 3 hours to try to get his bladder is empty as possible.  He is getting go ahead and increase his tamsulosin to 2 capsules daily.    I am good to get him set up to do cystoscopy with bilateral retrogrades in the operating room with Dr. Bowman per her recommendations based on his CT scan and his hematuria.  This is scheduled for 11/21/2023.  Orders were placed.    I will see him back in 6 weeks to see how he is doing with his PVR.  He was advised that if he is not able to void he should not wait 6 weeks to follow-up he should go to the emergency department if it is on the weekend or call us if it is during the week.          Follow Up   No follow-ups on file.  Patient was given instructions and counseling regarding his condition or for health maintenance advice. Please see specific information pulled into the AVS if appropriate.         This document has been electronically signed by RAMANA Pendleton  November 8, 2023 11:14 EST

## 2023-11-08 ENCOUNTER — OFFICE VISIT (OUTPATIENT)
Dept: UROLOGY | Facility: CLINIC | Age: 53
End: 2023-11-08
Payer: COMMERCIAL

## 2023-11-08 VITALS
HEART RATE: 57 BPM | BODY MASS INDEX: 20.15 KG/M2 | SYSTOLIC BLOOD PRESSURE: 110 MMHG | DIASTOLIC BLOOD PRESSURE: 63 MMHG | WEIGHT: 132.94 LBS | HEIGHT: 68 IN

## 2023-11-08 DIAGNOSIS — N40.1 BENIGN PROSTATIC HYPERPLASIA WITH LOWER URINARY TRACT SYMPTOMS, SYMPTOM DETAILS UNSPECIFIED: ICD-10-CM

## 2023-11-08 DIAGNOSIS — R33.9 URINARY RETENTION: Primary | ICD-10-CM

## 2023-11-08 DIAGNOSIS — R31.0 GROSS HEMATURIA: ICD-10-CM

## 2023-11-08 LAB — URINE VOLUME: 171

## 2023-11-08 RX ORDER — SODIUM CHLORIDE 0.9 % (FLUSH) 0.9 %
10 SYRINGE (ML) INJECTION AS NEEDED
OUTPATIENT
Start: 2023-11-08

## 2023-11-08 RX ORDER — TAMSULOSIN HYDROCHLORIDE 0.4 MG/1
2 CAPSULE ORAL DAILY
Qty: 180 CAPSULE | Refills: 3 | Status: SHIPPED | OUTPATIENT
Start: 2023-11-08 | End: 2024-11-02

## 2023-11-08 RX ORDER — SODIUM CHLORIDE 9 MG/ML
40 INJECTION, SOLUTION INTRAVENOUS AS NEEDED
OUTPATIENT
Start: 2023-11-08

## 2023-11-08 RX ORDER — SODIUM CHLORIDE 0.9 % (FLUSH) 0.9 %
10 SYRINGE (ML) INJECTION EVERY 12 HOURS SCHEDULED
OUTPATIENT
Start: 2023-11-08

## 2023-11-08 RX ORDER — SODIUM CHLORIDE 9 MG/ML
100 INJECTION, SOLUTION INTRAVENOUS CONTINUOUS
OUTPATIENT
Start: 2023-11-08

## 2023-11-17 RX ORDER — HYDROXYZINE 50 MG/1
50 TABLET, FILM COATED ORAL DAILY PRN
COMMUNITY

## 2023-11-17 NOTE — NURSING NOTE
"Notifed Dr. Islas about patient using \"off street\" drugs for pain management.  Instructed per Dr. Islas to document and try to have patient not use for 2 days if possible.   Patient's wife called and notified that patient should refrain from street drugs for 2 days, she stated she understood.   Instructed if any further questions please call back   "

## 2023-11-17 NOTE — SIGNIFICANT NOTE
DID NOT ASK ABUSE QUESTIONS OR SELF HARM QUESTIONS DUE TO PATIENT HAVING ME ON SPEAKER PHONE WITH WIFE PRESENT

## 2023-11-17 NOTE — PRE-PROCEDURE INSTRUCTIONS
IMPORTANT INSTRUCTIONS - PRE-ADMISSION TESTING  DO NOT EAT OR CHEW anything after midnight the night before your procedure.    You may have CLEAR liquids up to __2___ hours prior to ARRIVAL time.   Take the following medications the morning of your procedure with JUST A SIP OF WATER:  _BACLOFEN, BUSPAR, HYDROXYZINE, PRILOSEC,FLOMAX  _____________    DO NOT BRING your medications to the hospital with you, UNLESS something has changed since your PRE-Admission Testing appointment.  Hold all vitamins, supplements, and NSAIDS (Non- steroidal anti-inflammatory meds) for one week prior to surgery (you MAY take Tylenol or Acetaminophen).  If you are diabetic, check your blood sugar the morning of your procedure. If it is less than 70 or if you are feeling symptomatic, call the following number for further instructions: [phone]_.  Use your inhalers/nebulizers as usual, the morning of your procedure. BRING YOUR INHALERS with you.   Bring your CPAP or BIPAP to hospital, ONLY IF YOU WILL BE SPENDING THE NIGHT.   Make sure you have a ride home and have someone who will stay with you the day of your procedure after you go home.  If you have any questions, please call your Pre-Admission Testing Nurse,CHAPARRITA_ at [phone]___.   Per anesthesia request, do not smoke for 24 hours before your procedure or as instructed by your surgeon.    Clear Liquid Diet        Find out when you need to start a clear liquid diet.   Think of “clear liquids” as anything you could read a newspaper through. This includes things like water, broth, sports drinks, or tea WITHOUT any kind of milk or cream.           Once you are told to start a clear liquid diet, only drink these things until 2 hours before arrival to the hospital or when the hospital says to stop. Total volume limitation: 8 oz.       Clear liquids you CAN drink:   Water   Clear broth: beef, chicken, vegetable, or bone broth with nothing in it   Gatorade   Lemonade or Simone-aid   Soda    Tea, coffee (NO cream or honey)   Jell-O (without fruit)   Popsicles (without fruit or cream)   Italian ices   Juice without pulp: apple, white, grape   You may use salt, pepper, and sugar  NO RED LIQUIDS     Do NOT drink:   Milk or cream   Soy milk, almond milk, coconut milk, or other non-dairy drinks and   creamers   Milkshakes or smoothies   Tomato juice   Orange juice   Grapefruit juice   Cream soups or any other than broth         Clear Liquid Diet:  Do NOT eat any solid food.  Do NOT eat or suck on mints or candy.  Do NOT chew gum.  Do NOT drink thick liquids like milk or juice with pulp in it.  Do NOT add milk, cream, or anything like soy milk or almond milk to coffee or tea.

## 2023-11-21 ENCOUNTER — HOSPITAL ENCOUNTER (OUTPATIENT)
Facility: HOSPITAL | Age: 53
Setting detail: HOSPITAL OUTPATIENT SURGERY
Discharge: HOME | End: 2023-11-22
Attending: UROLOGY | Admitting: UROLOGY
Payer: COMMERCIAL

## 2023-11-21 ENCOUNTER — ANESTHESIA (OUTPATIENT)
Dept: PERIOP | Facility: HOSPITAL | Age: 53
End: 2023-11-21
Payer: COMMERCIAL

## 2023-11-21 ENCOUNTER — ANESTHESIA EVENT (OUTPATIENT)
Dept: PERIOP | Facility: HOSPITAL | Age: 53
End: 2023-11-21
Payer: COMMERCIAL

## 2023-11-21 ENCOUNTER — APPOINTMENT (OUTPATIENT)
Dept: GENERAL RADIOLOGY | Facility: HOSPITAL | Age: 53
End: 2023-11-21
Payer: COMMERCIAL

## 2023-11-21 VITALS
SYSTOLIC BLOOD PRESSURE: 120 MMHG | RESPIRATION RATE: 20 BRPM | TEMPERATURE: 99.9 F | OXYGEN SATURATION: 97 % | HEART RATE: 84 BPM | DIASTOLIC BLOOD PRESSURE: 86 MMHG | WEIGHT: 132.5 LBS | HEIGHT: 60 IN | BODY MASS INDEX: 26.01 KG/M2

## 2023-11-21 DIAGNOSIS — R33.9 URINARY RETENTION: ICD-10-CM

## 2023-11-21 DIAGNOSIS — R31.0 GROSS HEMATURIA: ICD-10-CM

## 2023-11-21 PROCEDURE — 25010000002 KETOROLAC TROMETHAMINE PER 15 MG: Performed by: NURSE ANESTHETIST, CERTIFIED REGISTERED

## 2023-11-21 PROCEDURE — 25010000002 FENTANYL CITRATE (PF) 50 MCG/ML SOLUTION: Performed by: NURSE ANESTHETIST, CERTIFIED REGISTERED

## 2023-11-21 PROCEDURE — 76000 FLUOROSCOPY <1 HR PHYS/QHP: CPT

## 2023-11-21 PROCEDURE — S0260 H&P FOR SURGERY: HCPCS | Performed by: UROLOGY

## 2023-11-21 PROCEDURE — 25510000001 IOPAMIDOL PER 1 ML: Performed by: UROLOGY

## 2023-11-21 PROCEDURE — 25810000003 LACTATED RINGERS PER 1000 ML: Performed by: ANESTHESIOLOGY

## 2023-11-21 PROCEDURE — C1769 GUIDE WIRE: HCPCS | Performed by: UROLOGY

## 2023-11-21 PROCEDURE — 25010000002 MIDAZOLAM PER 1MG: Performed by: ANESTHESIOLOGY

## 2023-11-21 PROCEDURE — C1758 CATHETER, URETERAL: HCPCS | Performed by: UROLOGY

## 2023-11-21 PROCEDURE — 25010000002 PROPOFOL 10 MG/ML EMULSION: Performed by: NURSE ANESTHETIST, CERTIFIED REGISTERED

## 2023-11-21 PROCEDURE — 52310 CYSTOSCOPY AND TREATMENT: CPT | Performed by: UROLOGY

## 2023-11-21 PROCEDURE — 74420 UROGRAPHY RTRGR +-KUB: CPT | Performed by: UROLOGY

## 2023-11-21 PROCEDURE — 25010000002 LEVOFLOXACIN PER 250 MG: Performed by: NURSE PRACTITIONER

## 2023-11-21 PROCEDURE — C1894 INTRO/SHEATH, NON-LASER: HCPCS | Performed by: UROLOGY

## 2023-11-21 RX ORDER — MAGNESIUM HYDROXIDE 1200 MG/15ML
LIQUID ORAL AS NEEDED
Status: DISCONTINUED | OUTPATIENT
Start: 2023-11-21 | End: 2023-11-21 | Stop reason: HOSPADM

## 2023-11-21 RX ORDER — FENTANYL CITRATE 50 UG/ML
INJECTION, SOLUTION INTRAMUSCULAR; INTRAVENOUS AS NEEDED
Status: DISCONTINUED | OUTPATIENT
Start: 2023-11-21 | End: 2023-11-21 | Stop reason: SURG

## 2023-11-21 RX ORDER — DEXMEDETOMIDINE HYDROCHLORIDE 100 UG/ML
INJECTION, SOLUTION INTRAVENOUS AS NEEDED
Status: DISCONTINUED | OUTPATIENT
Start: 2023-11-21 | End: 2023-11-21 | Stop reason: SURG

## 2023-11-21 RX ORDER — SODIUM CHLORIDE 9 MG/ML
40 INJECTION, SOLUTION INTRAVENOUS AS NEEDED
Status: DISCONTINUED | OUTPATIENT
Start: 2023-11-21 | End: 2023-11-21 | Stop reason: HOSPADM

## 2023-11-21 RX ORDER — MEPERIDINE HYDROCHLORIDE 25 MG/ML
12.5 INJECTION INTRAMUSCULAR; INTRAVENOUS; SUBCUTANEOUS
Status: DISCONTINUED | OUTPATIENT
Start: 2023-11-21 | End: 2023-11-22 | Stop reason: HOSPADM

## 2023-11-21 RX ORDER — SODIUM CHLORIDE 9 MG/ML
100 INJECTION, SOLUTION INTRAVENOUS CONTINUOUS
Status: DISCONTINUED | OUTPATIENT
Start: 2023-11-21 | End: 2023-11-22 | Stop reason: HOSPADM

## 2023-11-21 RX ORDER — LEVOFLOXACIN 5 MG/ML
500 INJECTION, SOLUTION INTRAVENOUS ONCE
Status: COMPLETED | OUTPATIENT
Start: 2023-11-21 | End: 2023-11-21

## 2023-11-21 RX ORDER — PROMETHAZINE HYDROCHLORIDE 12.5 MG/1
12.5 TABLET ORAL ONCE AS NEEDED
Status: DISCONTINUED | OUTPATIENT
Start: 2023-11-21 | End: 2023-11-22 | Stop reason: HOSPADM

## 2023-11-21 RX ORDER — KETOROLAC TROMETHAMINE 30 MG/ML
INJECTION, SOLUTION INTRAMUSCULAR; INTRAVENOUS AS NEEDED
Status: DISCONTINUED | OUTPATIENT
Start: 2023-11-21 | End: 2023-11-21 | Stop reason: SURG

## 2023-11-21 RX ORDER — GLYCOPYRROLATE 0.2 MG/ML
INJECTION INTRAMUSCULAR; INTRAVENOUS AS NEEDED
Status: DISCONTINUED | OUTPATIENT
Start: 2023-11-21 | End: 2023-11-21 | Stop reason: SURG

## 2023-11-21 RX ORDER — ACETAMINOPHEN 325 MG/1
650 TABLET ORAL ONCE
Status: DISCONTINUED | OUTPATIENT
Start: 2023-11-21 | End: 2023-11-22 | Stop reason: HOSPADM

## 2023-11-21 RX ORDER — SODIUM CHLORIDE 0.9 % (FLUSH) 0.9 %
10 SYRINGE (ML) INJECTION EVERY 12 HOURS SCHEDULED
Status: DISCONTINUED | OUTPATIENT
Start: 2023-11-21 | End: 2023-11-21 | Stop reason: HOSPADM

## 2023-11-21 RX ORDER — ONDANSETRON 2 MG/ML
4 INJECTION INTRAMUSCULAR; INTRAVENOUS ONCE AS NEEDED
Status: DISCONTINUED | OUTPATIENT
Start: 2023-11-21 | End: 2023-11-22 | Stop reason: HOSPADM

## 2023-11-21 RX ORDER — IBUPROFEN 600 MG/1
600 TABLET ORAL EVERY 6 HOURS PRN
Status: DISCONTINUED | OUTPATIENT
Start: 2023-11-21 | End: 2023-11-22 | Stop reason: HOSPADM

## 2023-11-21 RX ORDER — SODIUM CHLORIDE 0.9 % (FLUSH) 0.9 %
10 SYRINGE (ML) INJECTION AS NEEDED
Status: DISCONTINUED | OUTPATIENT
Start: 2023-11-21 | End: 2023-11-21 | Stop reason: HOSPADM

## 2023-11-21 RX ORDER — SODIUM CHLORIDE, SODIUM LACTATE, POTASSIUM CHLORIDE, CALCIUM CHLORIDE 600; 310; 30; 20 MG/100ML; MG/100ML; MG/100ML; MG/100ML
9 INJECTION, SOLUTION INTRAVENOUS CONTINUOUS PRN
Status: DISCONTINUED | OUTPATIENT
Start: 2023-11-21 | End: 2023-11-22 | Stop reason: HOSPADM

## 2023-11-21 RX ORDER — ACETAMINOPHEN 500 MG
1000 TABLET ORAL ONCE
Status: COMPLETED | OUTPATIENT
Start: 2023-11-21 | End: 2023-11-21

## 2023-11-21 RX ORDER — LIDOCAINE HYDROCHLORIDE 20 MG/ML
INJECTION, SOLUTION EPIDURAL; INFILTRATION; INTRACAUDAL; PERINEURAL AS NEEDED
Status: DISCONTINUED | OUTPATIENT
Start: 2023-11-21 | End: 2023-11-21 | Stop reason: SURG

## 2023-11-21 RX ORDER — OXYCODONE HYDROCHLORIDE 5 MG/1
5 TABLET ORAL
Status: DISCONTINUED | OUTPATIENT
Start: 2023-11-21 | End: 2023-11-22 | Stop reason: HOSPADM

## 2023-11-21 RX ORDER — PROMETHAZINE HYDROCHLORIDE 25 MG/1
25 SUPPOSITORY RECTAL ONCE AS NEEDED
Status: DISCONTINUED | OUTPATIENT
Start: 2023-11-21 | End: 2023-11-22 | Stop reason: HOSPADM

## 2023-11-21 RX ORDER — MIDAZOLAM HYDROCHLORIDE 2 MG/2ML
0.5 INJECTION, SOLUTION INTRAMUSCULAR; INTRAVENOUS ONCE
Status: COMPLETED | OUTPATIENT
Start: 2023-11-21 | End: 2023-11-21

## 2023-11-21 RX ORDER — PROMETHAZINE HYDROCHLORIDE 12.5 MG/1
25 TABLET ORAL ONCE AS NEEDED
Status: DISCONTINUED | OUTPATIENT
Start: 2023-11-21 | End: 2023-11-22 | Stop reason: HOSPADM

## 2023-11-21 RX ADMIN — KETOROLAC TROMETHAMINE 15 MG: 30 INJECTION, SOLUTION INTRAMUSCULAR; INTRAVENOUS at 09:11

## 2023-11-21 RX ADMIN — ACETAMINOPHEN 1000 MG: 500 TABLET ORAL at 08:49

## 2023-11-21 RX ADMIN — GLYCOPYRROLATE 0.2 MG: 0.2 INJECTION INTRAMUSCULAR; INTRAVENOUS at 09:24

## 2023-11-21 RX ADMIN — PROPOFOL 150 MCG/KG/MIN: 10 INJECTION, EMULSION INTRAVENOUS at 09:02

## 2023-11-21 RX ADMIN — FENTANYL CITRATE 50 MCG: 50 INJECTION, SOLUTION INTRAMUSCULAR; INTRAVENOUS at 09:08

## 2023-11-21 RX ADMIN — DEXMEDETOMIDINE 8 MCG: 100 INJECTION, SOLUTION INTRAVENOUS at 09:22

## 2023-11-21 RX ADMIN — LIDOCAINE HYDROCHLORIDE 60 MG: 20 INJECTION, SOLUTION EPIDURAL; INFILTRATION; INTRACAUDAL; PERINEURAL at 09:03

## 2023-11-21 RX ADMIN — SODIUM CHLORIDE, POTASSIUM CHLORIDE, SODIUM LACTATE AND CALCIUM CHLORIDE: 600; 310; 30; 20 INJECTION, SOLUTION INTRAVENOUS at 09:00

## 2023-11-21 RX ADMIN — GLYCOPYRROLATE 0.2 MG: 0.2 INJECTION INTRAMUSCULAR; INTRAVENOUS at 09:15

## 2023-11-21 RX ADMIN — MIDAZOLAM HYDROCHLORIDE 0.5 MG: 1 INJECTION, SOLUTION INTRAMUSCULAR; INTRAVENOUS at 08:49

## 2023-11-21 RX ADMIN — LEVOFLOXACIN 500 MG: 5 INJECTION, SOLUTION INTRAVENOUS at 09:06

## 2023-11-21 RX ADMIN — SODIUM CHLORIDE, POTASSIUM CHLORIDE, SODIUM LACTATE AND CALCIUM CHLORIDE 9 ML/HR: 600; 310; 30; 20 INJECTION, SOLUTION INTRAVENOUS at 08:49

## 2023-11-21 RX ADMIN — FENTANYL CITRATE 50 MCG: 50 INJECTION, SOLUTION INTRAMUSCULAR; INTRAVENOUS at 09:01

## 2023-11-21 NOTE — DISCHARGE INSTRUCTIONS
DISCHARGE INSTRUCTIONS CYSTOSCOPY      For your surgery you had:  General anesthesia (you may have a sore throat for the first 24 hours)  IV sedation  Local anesthesia  Monitored anesthesia care  You received a medicated patch for nausea prevention today (behind your ear). It is recommended that you remove it 24-48 hours post-operatively. It must be removed within 72 hours.  You have received an anesthesia medication today that can cause hormonal forms of birth control to be ineffective. You should use a different form of birth control (to prevent pregnancy) for 7 days.   You may experience dizziness, drowsiness, or lightheadedness for several hours following surgery.  Do not stay alone today or tonight.  Limit your activity for 24 hours.  You should not drive, operate machinery, drink alcohol, or sign legally binding documents for 24 hours or while you are taking pain medication.  Resume your diet slowly.  Follow any special dietary instructions you may have been given by your doctor.  Last dose of pain medication given at:   .  NOTIFY YOUR DOCTOR IF YOU EXPERIENCE ANY OF THE FOLLOWING:  Temperature greater than 101 degrees Fahrenheit  Shaking Chills  Redness or excessive drainage from incision  Nausea, vomiting and/or pain that is not controlled by prescribed medications  Increase in bleeding or bleeding that is excessive  Unable to urinate in 6 hours after surgery  If unable to reach your doctor, please go to the closest Emergency Room   Following your cystoscopy exam, you may experience burning upon urination.  You may also pass some bloody urine.  If the burning sensation and/or bloody urine should persist beyond 48 hours, call your doctor.  To encourage kidney and bladder function, you should drink as much fluid as possible.  If you have difficulty urinating, try sitting in a bath tub of warm water.  If you become uncomfortable because you cannot urinate, call your doctor or come to the Emergency Room at the  hospital.  Medications per physician instructions as indicated on Discharge Medication Information Sheet.  You should see   for follow-up care  on   .  Phone number:        SPECIAL INSTRUCTIONS:  No tylenol until 12:49   No ibuprofen until 03:11

## 2023-11-21 NOTE — H&P
[facility]   Urology HISTORY AND PHYSICAL    Patient Name: Haider Jacobs  : 1970  MRN: [id number]  Primary Care Physician:  Ronen Ohara MD  Date of admission: 2023    Subjective   Subjective       History of Present Illness  Patient has urinary retention, possible bladder mass and bladder wall thickening and hematuria and presents for cystoscopy and bilateral retrograde pyelograms.       Personal History     Past Medical History:   Diagnosis Date    Anxiety     Chronic tetraplegia     Contracture of hand     LEFT HAND TIGHT HAND CONTRACTURE. RIGHT HAD CAN OPEN 30%.    Drug abuse     Hematuria     Neurogenic bladder     Spinal cord injury at C1-C4 level without spinal injury     Urinary retention        Past Surgical History:   Procedure Laterality Date    ANKLE SURGERY      CERVICAL FUSION      C2-C7    FRACTURE SURGERY      ARM       Family History: family history is not on file. Otherwise pertinent FHx was reviewed and not pertinent to current issue.    Social History:  reports that he has been smoking cigarettes. He has been smoking an average of 1 pack per day. He has been exposed to tobacco smoke. He has never used smokeless tobacco. He reports that he does not currently use alcohol. He reports current drug use.    Home Medications:  Biotin, QUEtiapine Fumarate, baclofen, busPIRone, hydrOXYzine, omeprazole, tamsulosin, and traZODone    Allergies:  Allergies   Allergen Reactions    Varenicline Other (See Comments)     dizziness       Objective    Objective     Vitals:   Temp:  [99 °F (37.2 °C)] 99 °F (37.2 °C)  Heart Rate:  [76] 76  Resp:  [18] 18  BP: (110)/(76) 110/76    Physical Exam  Constitutional:       Appearance: Normal appearance.   Cardiovascular:      Rate and Rhythm: Normal rate and regular rhythm.   Pulmonary:      Effort: Pulmonary effort is normal.      Breath sounds: Normal breath sounds.   Neurological:      Mental Status: He is alert. Mental status is at baseline.    Psychiatric:         Mood and Affect: Mood and affect normal.         Speech: Speech normal.         Judgment: Judgment normal.         Result Review    Result Review:  I have personally reviewed the results from the time of this admission to 11/21/2023 08:21 EST and agree with these findings:  [x]  Laboratory  []  Microbiology  [x]  Radiology  []  EKG/Telemetry   []  Cardiology/Vascular   []  Pathology  [x]  Old records  []  Other:      Assessment & Plan   Assessment / Plan       Active Hospital Problems:  Active Hospital Problems    Diagnosis     Urinary retention     Gross hematuria        Plan: cystoscopy and bilateral retrograde pyelograms  Risks and benefits discussed with patient and they are agreeable to proceed.    DVT prophylaxis:  No DVT prophylaxis order currently exists.    CODE STATUS:           Electronically signed by Jennifer Bowman MD, 11/21/23, 8:21 AM EST.

## 2023-11-21 NOTE — ANESTHESIA PREPROCEDURE EVALUATION
Anesthesia Evaluation     Patient summary reviewed and Nursing notes reviewed                Airway   Mallampati: I  TM distance: >3 FB  Neck ROM: full  No difficulty expected  Dental      Pulmonary - negative pulmonary ROS and normal exam    breath sounds clear to auscultation  Cardiovascular - negative cardio ROS and normal exam    Rhythm: regular  Rate: normal        Neuro/Psych  (+) neuromuscular disease, psychiatric history  GI/Hepatic/Renal/Endo - negative ROS     Musculoskeletal (-) negative ROS    Abdominal    Substance History - negative use     OB/GYN negative ob/gyn ROS         Other        ROS/Med Hx Other: Spinal cord injury at C1-C4 level 2006.  C-3 level tetraparesis.               Anesthesia Plan    ASA 3     general     intravenous induction     Anesthetic plan, risks, benefits, and alternatives have been provided, discussed and informed consent has been obtained with: patient.    CODE STATUS:

## 2023-11-21 NOTE — ANESTHESIA POSTPROCEDURE EVALUATION
Patient: Haider Jacobs    Procedure Summary       Date: 11/21/23 Room / Location: [facility] OR 07 / [facility] MAIN OR    Anesthesia Start: 0900 Anesthesia Stop: 0932    Procedure: CYSTOSCOPY BILATERAL RETROGRADE PYELOGRAM BLADDER STONE EXTRACTION (Bilateral: Ureter) Diagnosis:       Urinary retention      Gross hematuria      (Urinary retention [R33.9])      (Gross hematuria [R31.0])    Surgeons: Jennifer Bowman MD Provider: Michael Al MD    Anesthesia Type: general ASA Status: 3            Anesthesia Type: general    Vitals  Vitals Value Taken Time   /73 11/21/23 0959   Temp 36.1 °C (97 °F) 11/21/23 0935   Pulse 67 11/21/23 1004   Resp 13 11/21/23 0945   SpO2 98 % 11/21/23 1004   Vitals shown include unfiled device data.        Post Anesthesia Care and Evaluation    Patient location during evaluation: bedside  Patient participation: complete - patient participated  Level of consciousness: awake  Pain management: adequate    Airway patency: patent  PONV Status: none  Cardiovascular status: acceptable and stable  Respiratory status: acceptable  Hydration status: acceptable    Comments: An Anesthesiologist personally participated in the most demanding procedures (including induction and emergence if applicable) in the anesthesia plan, monitored the course of anesthesia administration at frequent intervals and remained physically present and available for immediate diagnosis and treatment of emergencies.

## 2023-11-21 NOTE — OP NOTE
URETEROSCOPY LASER LITHOTRIPSY WITH STENT INSERTION  Procedure Report    Patient Name:  Haider Jacobs  YOB: 1970    Date of Surgery:  11/21/2023     Pre-op Diagnosis:   Urinary retention [R33.9]  Gross hematuria [R31.0]       Post-Op Diagnosis Codes:     * Urinary retention [R33.9]     * Gross hematuria [R31.0]      Procedure/CPT® Codes:    Procedure(s):  CYSTOSCOPY WITH BILATERAL RETROGRADE PYELOGRAM   BLADDER STONE REMOVAL      Staff:  Surgeon(s):  Jennifer Bowman MD    Assistant: Brian Cabrera RN CSA    Anesthesia: Monitored Anesthesia Care    Estimated Blood Loss: 2 mL    Implants:    Nothing was implanted during the procedure    Specimen:          None        Complications: None    Description of Procedure:     After proper consent was obtained, patient was taken to operating room and MAC anesthesia was performed.  The patient was placed in the dorsal lithotomy position and prepped and draped in the normal sterile fashion for cystoscopy.      A 22 Polish rigid cystoscope was inserted into the bladder.  The bladder was inspected in a systemic meridian fashion using a 30  degree lens. There were no tumors seen but the patient did have diffuse trabeculations as well as stone broad-based diverticuli.  This was consistent with his neurogenic bladder and urinary retention.  The bladder looked diffusely abnormal but no tumors were seen.  There was very small stones and sludge in the bottom of the bladder.  These were irrigated out and removed using the scope..  Both ureteral orifices were normal in appearance but were on an elevated trigone again due to the trabeculations.      Bilateral retrograde pyelograms were then performed, first on the patient's left side and then on the right.  There were no filling defects, tumors, stones or other abnormalities seen except for the fact that the ureters were angulated due to the trabeculations of the bladder.  The bladder was emptied and the  cystoscope removed.      The patient tolerated the procedure well and was transferred to the PACU in stable condition.      Jennifer Bowman MD     Date: 11/21/2023  Time: 09:43 EST

## 2023-11-27 ENCOUNTER — TELEPHONE (OUTPATIENT)
Dept: UROLOGY | Facility: CLINIC | Age: 53
End: 2023-11-27
Payer: COMMERCIAL

## 2023-11-27 NOTE — SIGNIFICANT NOTE
Pt states he is still in pain (6/10 on pain scale).  Told to inform surgeon's office and let them know.  Pt voiced understanding.

## 2023-11-27 NOTE — TELEPHONE ENCOUNTER
Left message for patient's wife explaining that per Dr. Bowman, results from procedure were normal and that appointment on Wednesday can be canceled unless patient is having problems. I asked for a callback to let us know and informed that if canceling patient has a follow up with Jerica on 12/20/23.

## 2023-12-04 ENCOUNTER — TELEPHONE (OUTPATIENT)
Dept: UROLOGY | Facility: CLINIC | Age: 53
End: 2023-12-04
Payer: COMMERCIAL

## 2023-12-19 ENCOUNTER — TRANSCRIBE ORDERS (OUTPATIENT)
Dept: ADMINISTRATIVE | Facility: HOSPITAL | Age: 53
End: 2023-12-19
Payer: COMMERCIAL

## 2023-12-19 ENCOUNTER — HOSPITAL ENCOUNTER (OUTPATIENT)
Dept: GENERAL RADIOLOGY | Facility: HOSPITAL | Age: 53
Discharge: HOME | End: 2023-12-19
Payer: COMMERCIAL

## 2023-12-19 DIAGNOSIS — R06.00 DYSPNEA, UNSPECIFIED TYPE: ICD-10-CM

## 2023-12-19 DIAGNOSIS — M54.50 LOW BACK PAIN, UNSPECIFIED BACK PAIN LATERALITY, UNSPECIFIED CHRONICITY, UNSPECIFIED WHETHER SCIATICA PRESENT: ICD-10-CM

## 2023-12-19 DIAGNOSIS — R06.00 DYSPNEA, UNSPECIFIED TYPE: Primary | ICD-10-CM

## 2023-12-19 PROCEDURE — 72110 X-RAY EXAM L-2 SPINE 4/>VWS: CPT

## 2023-12-19 PROCEDURE — 71046 X-RAY EXAM CHEST 2 VIEWS: CPT

## 2023-12-20 NOTE — PROGRESS NOTES
Chief Complaint: Urinary Retention    Subjective         History of Present Illness  Haider Jacobs is a 53 y.o. male presents to [facility] to be seen for follow-up.    Patient was previously seen by me with last visit on 11/8/2023 for BPH/urinary retention at that visit we discussed that he should be voiding every 3 hours to try to keep his bladder empty.  He did increase his tamsulosin to 2 capsules daily and then had a cystoscopy with bilateral retrogrades per Dr. Bowman.  His PVR at his last visit was 171.    Cystoscopy report:  A 22 [place] rigid cystoscope was inserted into the bladder.  The bladder was inspected in a systemic meridian fashion using a 30  degree lens. There were no tumors seen but the patient did have diffuse trabeculations as well as stone broad-based diverticuli.  This was consistent with his neurogenic bladder and urinary retention.  The bladder looked diffusely abnormal but no tumors were seen.  There was very small stones and sludge in the bottom of the bladder.  These were irrigated out and removed using the scope..  Both ureteral orifices were normal in appearance but were on an elevated trigone again due to the trabeculations.       Bilateral retrograde pyelograms were then performed, first on the patient's left side and then on the right.  There were no filling defects, tumors, stones or other abnormalities seen except for the fact that the ureters were angulated due to the trabeculations of the bladder.  The bladder was emptied and the cystoscope removed.         Previous 11/8/2023:  Patient was previously seen by me with last visit on 8/8/2023 for BPH/urinary retention.  We did discuss at that visit that he needs to set an alarm to void at least every 3 hours to ensure he is emptying his bladder well.  We also discussed that he did need to get his PSA prior to this appointment.  His PVR at that visit was 102.  He previously had done CIC so is well versed  "with doing this as needed.  Patient was seen in the ER on 10/19/2023 for blood in his urine.  He did have a CT scan completed at that visit.  He is here for follow-up.        He states he is doing better with urination since starting on tamsulosin. He does not get the sensation to void but does \"bladder tapping\" to help him void. He does reports he wears depends at night and it is full in the morning. He is not doing timed voiding.            PROCEDURE:  CT ABDOMEN PELVIS W CONTRAST     COMPARISON: [facility], CT, CT ABDOMEN PELVIS W CONTRAST, 6/17/2023, 11:22.     INDICATIONS:  abdominal pain, hematuria at home x1 day, hx spinal cord injury     TECHNIQUE:    After obtaining the patient's consent, CT images were created with non-ionic intravenous   contrast material.       PROTOCOL:     Standard imaging protocol performed                 RADIATION:      DLP: 426.1 mGy*cm               Automated exposure control was utilized to minimize radiation dose.   CONTRAST:      75 cc Isovue 370 I.V.  LABS:   eGFR: 95.1 ml/min/1.73m2     FINDINGS:          The lung bases are stable.  There is mild atelectasis.  There is dystrophic soft tissue   calcifications in the right flank soft tissues.  There is mild edema in the soft tissues.  There is   mild intra and extrahepatic biliary dilation.  Extrahepatic bile duct measures up to about 0.8   centimeters.  The gallbladder demonstrates no acute finding.  Spleen appears similar to previous.    No acute pancreatic finding.  No adrenal finding.  There are several bilateral renal cysts.  There   is no obstructive uropathy.  Ureters are difficult to follow due to lack of intra-abdominal fat.      There are multiple phleboliths.  There is trabeculations involving the bladder wall.  Bladder is   mildly distended There is a fold or septation in the bladder.  Portions the bladder wall mildly   thickened.  No significant inflammation.  Bladder is mildly distended.  There is " echogenic material   seen with lumen of the dependent bladder measuring 3.4 x 1.6 centimeter.  There is an irregular   contour of the bladder.  There is moderate to large amount of stool throughout the colon.  There is   no inflammatory process.  Appendix not well visualized not clearly abnormal.  Mild atherosclerosis.    No enlarged adenopathy.  Mild Prostatomegaly slightly impinging on the bladder base.  Mild   levocurvature in the spine; there is a mild compression deformity of L2 is stable.  There is mild   to moderate compression deformity of L1.  Mild compression deformity of T11 and T10 also appears   similar.  Degenerative changes seen in the lumbar spine.  A trace amount of pelvic ascites.     IMPRESSION:                 1. No renal calculi.  No obstructive uropathy.  Ureters are difficult to follow due to lack of   intra-abdominal fat.  There is multiple phleboliths in the pelvis.  2. 3.4 x 1.6 centimeter collect of echogenic debris or cluster of tiny stones in the dependent left   aspect of the bladder.  This appears new compared to prior CT 6/17/2023.    3. Bladder is mildly distended.  There are multiple bladder trabeculations which appear new.    Portions of the bladder wall could be mildly thickened.  Correlate with urinalysis to exclude   cystitis.  Follow-up cystoscopy may be useful.  4. Trace pelvic ascites.  5. Moderate to large amount of stool throughout the colon.  6. There is mild intra and extrahepatic biliary dilation.  No cholelithiasis.  Correlate with   history and lab values and follow-up with MRCP if indicated clinically.  Findings are similar to   prior CT scan.  7. Multiple compression deformities in the lower thoracic and lumbar spine greatest at L1 similar   to prior CT 6/17/2023 likely old compression fractures.            SHAYNA GOTTI MD         Electronically Signed and Approved By: SHAYNA GOTTI MD on 10/19/2023 at 16:40        PSA  11/1/2023 1.4     Urine  culture  10/19/2023 greater than 100,000 colony-forming's per mL Morganella Morgagni, resistant to ampicillin, Unasyn, cefazolin, nitrofurantoin, Zosyn otherwise sensitive; #2 greater than 100,000 colony-forming's per mL Klebsiella oxytoca resistant to ampicillin, intermediate to Unasyn, otherwise sensitive  8/8/2023 greater than 100 Thousand colony-forming units per mL mixed yady     UA with microscopy  10/19/2023 RBCs 6-10 per high-powered field, WBCs too numerous to count, bacteria 4+           Previous 8/8/2023:   Patient was previously seen by me with last visit on 7/11/2023 for BPH/urinary retention.  We did start him on tamsulosin and had him come back 1 week later for a void trial he is here for follow-up. He reports tamsulosin is helping with his symptoms. He reports he is going more frequently during the night since starting tamsulosin. He does report he is having some bladder spasms, burning with urination and foul odor to urine.      Previous 7/11/2023:  Patient was seen in the ER on 6/17/2023 with lower abdominal pain and foul-smelling urine.  Patient is paraplegic from spinal cord injury and has difficulty with urination but does not require self-catheterization.Patient was sent home with a Jaquez catheter and he is here for urinary retention follow-up.     Patient reports he was having a lot of bladder pain. He was seen in ER and was treated with a jaquez catheter. He reports he is still having bladder pain when he touches his lower abdomen and when laying on back. He reports he was able to void prior to having the catheter placed. He reports it took 3 tries to get the catheter placed. He reports he was having burning with urination and urinary frequency. He reports he was tested for UTI in the ER and was negative. He reports that he does have urethral pain as well that started prior to having the jaquez catheter placed. He did have to do CIC after he had his accident which caused him to be paraplegic.          CT Abdomen Pelvis With Contrast     Result Date: 6/17/2023  PROCEDURE:            CT ABDOMEN PELVIS W CONTRAST  COMPARISON:    [facility], CT, CT ABDOMEN PELVIS W CONTRAST, 2/04/2022, 16:38.  INDICATIONS:          GENERALIZED Abdominal pain  TECHNIQUE:         After obtaining the patient's consent, CT images were created with non-ionic intravenous contrast material.   PROTOCOL:             Standard imaging protocol performed    RADIATION:              DLP: 534.7 mGy*cm   Automated exposure control was utilized to minimize radiation dose. CONTRAST:     75 cc Isovue 370 I.V.  FINDINGS:   Minimal density in the left lung base is consistent with scarring or atelectasis.  The liver, gallbladder, spleen, pancreas and adrenal glands appear unremarkable.  Arising from the right kidneys inferior pole is a 1.1 cm cyst.  In the mid left kidney is a 1.3 cm cyst.  There are sub cm hypodense foci elsewhere in the kidneys likely representing small cysts but too small for definitive characterization.  No adenopathy is seen in the abdomen or pelvis.  The urinary bladder is mildly distended but otherwise appears unremarkable.  Multiple pelvic calcifications are consistent with phleboliths.  No convincing ureteral stone is seen.  There is a small to moderate amount of stool in the large bowel.  The bowel otherwise appears unremarkable.  The lack of oral contrast limits evaluation of the bowel.  No hernia is identified.  The appendix is not well seen.  No overt inflammatory changes identified in the right lower quadrant.  There are compression fractures of L1 and T11 which are chronic and unchanged.  No acute fracture is seen.                    1. Small to moderate amount of stool in the large bowel.  Correlate clinically for constipation. 2. Chronic T11 and L1 compression fractures 3. Multiple suspected bilateral renal cysts     Edward Hicks M.D.       Electronically Signed and Approved By: Edward Hicks M.D.  on 6/17/2023 at 12:29                  Objective     Past Medical History:   Diagnosis Date    Anxiety     Chronic tetraplegia     Contracture of hand     LEFT HAND TIGHT HAND CONTRACTURE. RIGHT HAD CAN OPEN 30%.    Drug abuse     Hematuria     Neurogenic bladder     Spinal cord injury at C1-C4 level without spinal injury     Urinary retention        Past Surgical History:   Procedure Laterality Date    ANKLE SURGERY      CERVICAL FUSION      C2-C7    FRACTURE SURGERY      ARM    URETEROSCOPY LASER LITHOTRIPSY WITH STENT INSERTION Bilateral 11/21/2023    Procedure: CYSTOSCOPY BILATERAL RETROGRADE PYELOGRAM BLADDER STONE EXTRACTION;  Surgeon: Jennifer Bowman MD;  Location: [facility] MAIN OR;  Service: Urology;  Laterality: Bilateral;         Current Outpatient Medications:     baclofen (LIORESAL) 20 MG tablet, Take 1 tablet by mouth 2 (Two) Times a Day. Takes 2 tablets, Disp: , Rfl:     BIOTIN PO, Take  by mouth., Disp: , Rfl:     busPIRone (BUSPAR) 15 MG tablet, buspirone 15 mg oral tablet take 1 tablet (15 mg) by oral route 2 times per day   Active, Disp: , Rfl:     furosemide (LASIX) 20 MG tablet, Take 1 tablet by mouth Daily., Disp: , Rfl:     hydrOXYzine (ATARAX) 50 MG tablet, Take 1 tablet by mouth Daily As Needed for Itching., Disp: , Rfl:     omeprazole (priLOSEC) 40 MG capsule, Take 1 capsule by mouth Daily., Disp: , Rfl:     QUEtiapine Fumarate (SEROQUEL PO), Take 400 mg by mouth Every Night., Disp: , Rfl:     tamsulosin (FLOMAX) 0.4 MG capsule 24 hr capsule, Take 2 capsules by mouth Daily for 360 days., Disp: 180 capsule, Rfl: 3    traZODone (DESYREL) 100 MG tablet, 1 tablet every night at bedtime., Disp: , Rfl:     Allergies   Allergen Reactions    Varenicline Other (See Comments)     dizziness        History reviewed. No pertinent family history.    Social History     Socioeconomic History    Marital status:    Tobacco Use    Smoking status: Every Day     Packs/day: 1     Types: Cigarettes      "Passive exposure: Current    Smokeless tobacco: Never   Vaping Use    Vaping Use: Never used   Substance and Sexual Activity    Alcohol use: Not Currently    Drug use: Yes     Comment: fentanyl powder /percocets \"off the street\" for managing pain    Sexual activity: Defer       Vital Signs:   /90 (BP Location: Right arm, Patient Position: Sitting, Cuff Size: Adult)   Pulse 90   Ht 142.2 cm (56\")   Wt 60.1 kg (132 lb 7.9 oz)   BMI 29.71 kg/m²      Physical Exam  Vitals reviewed.   Constitutional:       Appearance: Normal appearance.   Neurological:      General: No focal deficit present.      Mental Status: He is alert and oriented to person, place, and time.   Psychiatric:         Mood and Affect: Mood normal.         Behavior: Behavior normal.          Result Review :   The following data was reviewed by: RAMANA Pendleton on 12/22/2023:  Results for orders placed or performed in visit on 12/22/23   Bladder Scan   Result Value Ref Range    Urine Volume 260       PSA          11/1/2023    11:21   PSA   PSA 1.400        Bladder Scan interpretation 12/22/2023    Estimation of residual urine via [employer] 3000 Trans[employer]on Bladder Scan  MA/nurse performing: Cielo IGLESIAS MA  Residual Urine: 260 ml  Indication: Urinary retention   Position: Supine  Examination: Incremental scanning of the suprapubic area using 2.0 MHz transducer using copious amounts of acoustic gel.   Findings: An anechoic area was demonstrated which represented the bladder, with measurement of residual urine as noted. I inspected this myself. In that the residual urine was significant, refer to plan for treatment and plan necessary at this time.           Procedures        Assessment and Plan    Diagnoses and all orders for this visit:    1. Urinary retention (Primary)  -     Bladder Scan  -     Cystometrogram; Future    Given his history of spinal cord injury, I feel that his retention may be related to this versus the size of his prostate.  " I know to schedule him for urodynamics so that we can evaluate the squeezing function of his bladder.  We did discuss that he does need to continue to schedule void time every 3 hours to try to rest his bladder is much as possible.  I am sending him home with a few straight caths so that he can cath if needed he is comfortable with cathing.  He prefers not to do it all the time because he did have many more urinary tract infections with cathing that he did with being in retention.  I will plan to see him back 2 weeks after his urodynamics to go over those results and come up with a plan of treatment.      Follow Up   Return for please schedule urodynamics and a 2 week f/u after with me.  Patient was given instructions and counseling regarding his condition or for health maintenance advice. Please see specific information pulled into the AVS if appropriate.         This document has been electronically signed by RAMANA Pendleton  December 22, 2023 13:36 EST

## 2023-12-22 ENCOUNTER — OFFICE VISIT (OUTPATIENT)
Dept: UROLOGY | Facility: CLINIC | Age: 53
End: 2023-12-22
Payer: COMMERCIAL

## 2023-12-22 VITALS
BODY MASS INDEX: 26.01 KG/M2 | HEIGHT: 60 IN | HEART RATE: 90 BPM | WEIGHT: 132.5 LBS | DIASTOLIC BLOOD PRESSURE: 90 MMHG | SYSTOLIC BLOOD PRESSURE: 130 MMHG

## 2023-12-22 DIAGNOSIS — R33.9 URINARY RETENTION: Primary | ICD-10-CM

## 2023-12-22 LAB — URINE VOLUME: 260

## 2023-12-22 RX ORDER — FUROSEMIDE 20 MG/1
20 TABLET ORAL EVERY 24 HOURS
COMMUNITY

## 2023-12-28 ENCOUNTER — APPOINTMENT (OUTPATIENT)
Dept: GENERAL RADIOLOGY | Facility: HOSPITAL | Age: 53
End: 2023-12-28
Payer: COMMERCIAL

## 2023-12-28 ENCOUNTER — HOSPITAL ENCOUNTER (EMERGENCY)
Facility: HOSPITAL | Age: 53
Discharge: HOME | End: 2023-12-28
Attending: EMERGENCY MEDICINE
Payer: COMMERCIAL

## 2023-12-28 VITALS
OXYGEN SATURATION: 99 % | SYSTOLIC BLOOD PRESSURE: 127 MMHG | TEMPERATURE: 98.4 F | HEIGHT: 60 IN | BODY MASS INDEX: 29.71 KG/M2 | DIASTOLIC BLOOD PRESSURE: 82 MMHG | RESPIRATION RATE: 16 BRPM | HEART RATE: 74 BPM

## 2023-12-28 DIAGNOSIS — M54.9 BACK PAIN, UNSPECIFIED BACK LOCATION, UNSPECIFIED BACK PAIN LATERALITY, UNSPECIFIED CHRONICITY: Primary | ICD-10-CM

## 2023-12-28 LAB
ALBUMIN SERPL-MCNC: 4.4 G/DL (ref 3.5–5.2)
ALBUMIN/GLOB SERPL: 1.6 G/DL
ALP SERPL-CCNC: 81 U/L (ref 39–117)
ALT SERPL W P-5'-P-CCNC: 6 U/L (ref 1–41)
AMPHET+METHAMPHET UR QL: POSITIVE
ANION GAP SERPL CALCULATED.3IONS-SCNC: 11.1 MMOL/L (ref 5–15)
APAP SERPL-MCNC: <5 MCG/ML (ref 0–30)
AST SERPL-CCNC: 13 U/L (ref 1–40)
BARBITURATES UR QL SCN: NEGATIVE
BASOPHILS # BLD AUTO: 0.04 10*3/MM3 (ref 0–0.2)
BASOPHILS NFR BLD AUTO: 1.1 % (ref 0–1.5)
BENZODIAZ UR QL SCN: NEGATIVE
BILIRUB SERPL-MCNC: 0.3 MG/DL (ref 0–1.2)
BUN SERPL-MCNC: 19 MG/DL (ref 6–20)
BUN/CREAT SERPL: 19.8 (ref 7–25)
CALCIUM SPEC-SCNC: 9.6 MG/DL (ref 8.6–10.5)
CANNABINOIDS SERPL QL: NEGATIVE
CHLORIDE SERPL-SCNC: 99 MMOL/L (ref 98–107)
CO2 SERPL-SCNC: 29.9 MMOL/L (ref 22–29)
COCAINE UR QL: NEGATIVE
CREAT SERPL-MCNC: 0.96 MG/DL (ref 0.76–1.27)
DEPRECATED RDW RBC AUTO: 41.2 FL (ref 37–54)
EGFRCR SERPLBLD CKD-EPI 2021: 93.3 ML/MIN/1.73
EOSINOPHIL # BLD AUTO: 0.06 10*3/MM3 (ref 0–0.4)
EOSINOPHIL NFR BLD AUTO: 1.6 % (ref 0.3–6.2)
ERYTHROCYTE [DISTWIDTH] IN BLOOD BY AUTOMATED COUNT: 13.1 % (ref 12.3–15.4)
ETHANOL BLD-MCNC: <10 MG/DL (ref 0–10)
ETHANOL UR QL: <0.01 %
FENTANYL UR-MCNC: POSITIVE NG/ML
GLOBULIN UR ELPH-MCNC: 2.8 GM/DL
GLUCOSE BLDC GLUCOMTR-MCNC: 116 MG/DL (ref 70–99)
GLUCOSE SERPL-MCNC: 118 MG/DL (ref 65–99)
HCT VFR BLD AUTO: 41.3 % (ref 37.5–51)
HGB BLD-MCNC: 14 G/DL (ref 13–17.7)
HOLD SPECIMEN: NORMAL
HOLD SPECIMEN: NORMAL
IMM GRANULOCYTES # BLD AUTO: 0 10*3/MM3 (ref 0–0.05)
IMM GRANULOCYTES NFR BLD AUTO: 0 % (ref 0–0.5)
LYMPHOCYTES # BLD AUTO: 1.25 10*3/MM3 (ref 0.7–3.1)
LYMPHOCYTES NFR BLD AUTO: 33.9 % (ref 19.6–45.3)
MCH RBC QN AUTO: 29.4 PG (ref 26.6–33)
MCHC RBC AUTO-ENTMCNC: 33.9 G/DL (ref 31.5–35.7)
MCV RBC AUTO: 86.8 FL (ref 79–97)
METHADONE UR QL SCN: POSITIVE
MONOCYTES # BLD AUTO: 0.26 10*3/MM3 (ref 0.1–0.9)
MONOCYTES NFR BLD AUTO: 7 % (ref 5–12)
NEUTROPHILS NFR BLD AUTO: 2.08 10*3/MM3 (ref 1.7–7)
NEUTROPHILS NFR BLD AUTO: 56.4 % (ref 42.7–76)
NRBC BLD AUTO-RTO: 0 /100 WBC (ref 0–0.2)
OPIATES UR QL: POSITIVE
OXYCODONE UR QL SCN: NEGATIVE
PLATELET # BLD AUTO: 141 10*3/MM3 (ref 140–450)
PMV BLD AUTO: 10.8 FL (ref 6–12)
POTASSIUM SERPL-SCNC: 3.7 MMOL/L (ref 3.5–5.2)
PROT SERPL-MCNC: 7.2 G/DL (ref 6–8.5)
RBC # BLD AUTO: 4.76 10*6/MM3 (ref 4.14–5.8)
SALICYLATES SERPL-MCNC: <0.3 MG/DL
SODIUM SERPL-SCNC: 140 MMOL/L (ref 136–145)
WBC NRBC COR # BLD AUTO: 3.69 10*3/MM3 (ref 3.4–10.8)
WHOLE BLOOD HOLD COAG: NORMAL
WHOLE BLOOD HOLD SPECIMEN: NORMAL

## 2023-12-28 PROCEDURE — 85025 COMPLETE CBC W/AUTO DIFF WBC: CPT

## 2023-12-28 PROCEDURE — 80307 DRUG TEST PRSMV CHEM ANLYZR: CPT

## 2023-12-28 PROCEDURE — 72100 X-RAY EXAM L-S SPINE 2/3 VWS: CPT

## 2023-12-28 PROCEDURE — 82948 REAGENT STRIP/BLOOD GLUCOSE: CPT

## 2023-12-28 PROCEDURE — 99285 EMERGENCY DEPT VISIT HI MDM: CPT

## 2023-12-28 PROCEDURE — 80179 DRUG ASSAY SALICYLATE: CPT

## 2023-12-28 PROCEDURE — 25010000002 LORAZEPAM PER 2 MG: Performed by: EMERGENCY MEDICINE

## 2023-12-28 PROCEDURE — 82077 ASSAY SPEC XCP UR&BREATH IA: CPT

## 2023-12-28 PROCEDURE — 96375 TX/PRO/DX INJ NEW DRUG ADDON: CPT

## 2023-12-28 PROCEDURE — 25010000002 KETOROLAC TROMETHAMINE PER 15 MG: Performed by: EMERGENCY MEDICINE

## 2023-12-28 PROCEDURE — 96374 THER/PROPH/DIAG INJ IV PUSH: CPT

## 2023-12-28 PROCEDURE — 36415 COLL VENOUS BLD VENIPUNCTURE: CPT

## 2023-12-28 PROCEDURE — 80053 COMPREHEN METABOLIC PANEL: CPT

## 2023-12-28 PROCEDURE — 80143 DRUG ASSAY ACETAMINOPHEN: CPT

## 2023-12-28 RX ORDER — KETOROLAC TROMETHAMINE 15 MG/ML
15 INJECTION, SOLUTION INTRAMUSCULAR; INTRAVENOUS ONCE
Status: COMPLETED | OUTPATIENT
Start: 2023-12-28 | End: 2023-12-28

## 2023-12-28 RX ORDER — LORAZEPAM 2 MG/ML
1 INJECTION INTRAMUSCULAR ONCE
Status: COMPLETED | OUTPATIENT
Start: 2023-12-28 | End: 2023-12-28

## 2023-12-28 RX ORDER — CYCLOBENZAPRINE HCL 10 MG
10 TABLET ORAL 3 TIMES DAILY
Qty: 20 TABLET | Refills: 0 | Status: SHIPPED | OUTPATIENT
Start: 2023-12-28

## 2023-12-28 RX ORDER — SODIUM CHLORIDE 0.9 % (FLUSH) 0.9 %
10 SYRINGE (ML) INJECTION AS NEEDED
Status: DISCONTINUED | OUTPATIENT
Start: 2023-12-28 | End: 2023-12-28 | Stop reason: HOSPADM

## 2023-12-28 RX ADMIN — KETOROLAC TROMETHAMINE 15 MG: 15 INJECTION, SOLUTION INTRAMUSCULAR; INTRAVENOUS at 18:24

## 2023-12-28 RX ADMIN — LORAZEPAM 1 MG: 2 INJECTION INTRAMUSCULAR; INTRAVENOUS at 18:23

## 2023-12-28 NOTE — ED PROVIDER NOTES
"Time: 2:09 PM EST  Date of encounter:  12/28/2023  Independent Historian/Clinical History and Information was obtained by:   Patient    History is limited by: N/A    Chief Complaint   Patient presents with    Psychiatric Evaluation         History of Present Illness:  Patient is a 53 y.o. year old male who presents to the emergency department for evaluation of psychiatric evaluation.  Patient states that \"I feel like things keep getting on me\".  Patient states has been going on for the last 3 to 5 days and is very irritating to her skin.  Patient states to get so intense that it causes his legs and arms to straighten out.  Patient states \"it is either my medications or I need a psychiatric evaluation\".  Patient denies any changes in medications recently.  (Provider in triage, Reji Griffith PA-C)    Patient Care Team  Primary Care Provider: Ronen Ohara MD    Past Medical History:     Allergies   Allergen Reactions    Varenicline Other (See Comments)     dizziness     Past Medical History:   Diagnosis Date    Anxiety     Chronic tetraplegia     Contracture of hand     LEFT HAND TIGHT HAND CONTRACTURE. RIGHT HAD CAN OPEN 30%.    Drug abuse     Hematuria     Neurogenic bladder     Spinal cord injury at C1-C4 level without spinal injury     Urinary retention      Past Surgical History:   Procedure Laterality Date    ANKLE SURGERY      CERVICAL FUSION      C2-C7    FRACTURE SURGERY      ARM    URETEROSCOPY LASER LITHOTRIPSY WITH STENT INSERTION Bilateral 11/21/2023    Procedure: CYSTOSCOPY BILATERAL RETROGRADE PYELOGRAM BLADDER STONE EXTRACTION;  Surgeon: Jennifer Bowman MD;  Location: [facility];  Service: Urology;  Laterality: Bilateral;     History reviewed. No pertinent family history.    Home Medications:  Prior to Admission medications    Medication Sig Start Date End Date Taking? Authorizing Provider   baclofen (LIORESAL) 20 MG tablet Take 1 tablet by mouth 2 (Two) Times a Day. Takes 2 tablets    " "José Antonio Esposito MD   BIOTIN PO Take  by mouth.    José Antonio Esposito MD   busPIRone (BUSPAR) 15 MG tablet buspirone 15 mg oral tablet take 1 tablet (15 mg) by oral route 2 times per day   Active    José Antonio Esposito MD   furosemide (LASIX) 20 MG tablet Take 1 tablet by mouth Daily.    José Antonio Esposito MD   hydrOXYzine (ATARAX) 50 MG tablet Take 1 tablet by mouth Daily As Needed for Itching.    José Antonio Esposito MD   omeprazole (priLOSEC) 40 MG capsule Take 1 capsule by mouth Daily. 12/29/21   José Antonio Esposito MD   QUEtiapine Fumarate (SEROQUEL PO) Take 400 mg by mouth Every Night.    José Antonio Esposito MD   tamsulosin (FLOMAX) 0.4 MG capsule 24 hr capsule Take 2 capsules by mouth Daily for 360 days. 11/8/23 11/2/24  Rosey Fallon APRN   traZODone (DESYREL) 100 MG tablet 1 tablet every night at bedtime.    José Antonio Esposito MD        Social History:   Social History     Tobacco Use    Smoking status: Every Day     Packs/day: 1     Types: Cigarettes     Passive exposure: Current    Smokeless tobacco: Never   Vaping Use    Vaping Use: Never used   Substance Use Topics    Alcohol use: Not Currently    Drug use: Yes     Comment: fentanyl powder /percocets \"off the street\" for managing pain         Review of Systems:  Review of Systems   Constitutional:  Negative for chills and fever.   HENT:  Negative for congestion, rhinorrhea and sore throat.    Eyes:  Negative for pain and visual disturbance.   Respiratory:  Negative for apnea, cough, chest tightness and shortness of breath.    Cardiovascular:  Negative for chest pain and palpitations.   Gastrointestinal:  Negative for abdominal pain, diarrhea, nausea and vomiting.   Genitourinary:  Negative for difficulty urinating and dysuria.   Musculoskeletal:  Negative for joint swelling and myalgias.   Skin:  Negative for color change.   Neurological:  Negative for seizures and headaches.   Psychiatric/Behavioral: Negative.     All " "other systems reviewed and are negative.       Physical Exam:  /82 (BP Location: Left arm, Patient Position: Lying)   Pulse 74   Temp 98.4 °F (36.9 °C) (Oral)   Resp 16   Ht 142.2 cm (56\")   SpO2 99%   BMI 29.71 kg/m²         Physical Exam  Vitals and nursing note reviewed.   Constitutional:       General: He is not in acute distress.     Appearance: Normal appearance. He is not toxic-appearing.   HENT:      Head: Normocephalic and atraumatic.      Jaw: There is normal jaw occlusion.   Eyes:      General: Lids are normal.      Extraocular Movements: Extraocular movements intact.      Conjunctiva/sclera: Conjunctivae normal.      Pupils: Pupils are equal, round, and reactive to light.   Cardiovascular:      Rate and Rhythm: Normal rate and regular rhythm.      Pulses: Normal pulses.      Heart sounds: Normal heart sounds.   Pulmonary:      Effort: Pulmonary effort is normal. No respiratory distress.      Breath sounds: Normal breath sounds. No wheezing or rhonchi.   Abdominal:      General: Abdomen is flat. There is no distension.      Palpations: Abdomen is soft.      Tenderness: There is no abdominal tenderness. There is no guarding or rebound.   Musculoskeletal:         General: Normal range of motion.      Cervical back: Normal range of motion and neck supple.      Right lower leg: No edema.      Left lower leg: No edema.   Skin:     General: Skin is warm and dry.      Coloration: Skin is not cyanotic.   Neurological:      Mental Status: He is alert and oriented to person, place, and time. Mental status is at baseline.   Psychiatric:         Attention and Perception: Attention and perception normal.         Mood and Affect: Mood normal.                      Procedures:  Procedures      Medical Decision Making:      Comorbidities that affect care:    Anxiety, spinal cord injury    External Notes reviewed:    Previous Clinic Note: Patient was last seen in clinic for urinary retention.      The following " orders were placed and all results were independently analyzed by me:  Orders Placed This Encounter   Procedures    XR Spine Lumbar 2 or 3 View    Festus Draw    Comprehensive Metabolic Panel    Acetaminophen Level    Ethanol    Salicylate Level    Urine Drug Screen - Urine, Clean Catch    CBC Auto Differential    NPO Diet NPO Type: Strict NPO    Continuous Pulse Oximetry    Vital Signs    Undress & Gown    Psych / Access to See    Oxygen Therapy- Nasal Cannula; Titrate 1-6 LPM Per SpO2; 90 - 95%    POC Glucose Once    POC Glucose Once    Insert Peripheral IV    Suicide Precautions    CBC & Differential    Green Top (Gel)    Lavender Top    Gold Top - SST    Light Blue Top       Medications Given in the Emergency Department:  Medications   sodium chloride 0.9 % flush 10 mL (has no administration in time range)   LORazepam (ATIVAN) injection 1 mg (1 mg Intravenous Given 12/28/23 1823)   ketorolac (TORADOL) injection 15 mg (15 mg Intravenous Given 12/28/23 1824)        ED Course:    The patient was initially evaluated in the triage area where orders were placed. The patient was later dispositioned by Jose Roy MD.      The patient was advised to stay for completion of workup which includes but is not limited to communication of labs and radiological results, reassessment and plan. The patient was advised that leaving prior to disposition by a provider could result in critical findings that are not communicated to the patient.     ED Course as of 12/28/23 2040 Thu Dec 28, 2023   1410 PROVIDER IN TRIAGE  Patient was evaluated by me in triage, Reji Griffith PA-C.  Orders were placed and patient is currently awaiting final results and disposition.  [MD]      ED Course User Index  [MD] Reji Griffith PA-C       Labs:    Lab Results (last 24 hours)       Procedure Component Value Units Date/Time    CBC & Differential [[id number]]  (Normal) Collected: 12/28/23 1415    Specimen: Blood from Arm, Right Updated:  12/28/23 1449    Narrative:      The following orders were created for panel order CBC & Differential.  Procedure                               Abnormality         Status                     ---------                               -----------         ------                     CBC Auto Differential[[id number]]        Normal              Final result                 Please view results for these tests on the individual orders.    Comprehensive Metabolic Panel [[id number]]  (Abnormal) Collected: 12/28/23 1415    Specimen: Blood from Arm, Right Updated: 12/28/23 1508     Glucose 118 mg/dL      BUN 19 mg/dL      Creatinine 0.96 mg/dL      Sodium 140 mmol/L      Potassium 3.7 mmol/L      Chloride 99 mmol/L      CO2 29.9 mmol/L      Calcium 9.6 mg/dL      Total Protein 7.2 g/dL      Albumin 4.4 g/dL      ALT (SGPT) 6 U/L      AST (SGOT) 13 U/L      Alkaline Phosphatase 81 U/L      Total Bilirubin 0.3 mg/dL      Globulin 2.8 gm/dL      A/G Ratio 1.6 g/dL      BUN/Creatinine Ratio 19.8     Anion Gap 11.1 mmol/L      eGFR 93.3 mL/min/1.73     Narrative:      GFR Normal >60  Chronic Kidney Disease <60  Kidney Failure <15      Acetaminophen Level [[id number]]  (Normal) Collected: 12/28/23 1415    Specimen: Blood from Arm, Right Updated: 12/28/23 1508     Acetaminophen <5.0 mcg/mL     Ethanol [[id number]] Collected: 12/28/23 1415    Specimen: Blood from Arm, Right Updated: 12/28/23 1508     Ethanol <10 mg/dL      Ethanol % <0.010 %     Narrative:      Ethanol (Plasma)  <10 Essentially Negative    Toxic Concentrations           mg/dL    Flushing, slowing of reflexes    Impaired visual activity         Depression of CNS              >100  Possible Coma                  >300       Salicylate Level [[id number]]  (Normal) Collected: 12/28/23 1415    Specimen: Blood from Arm, Right Updated: 12/28/23 1508     Salicylate <0.3 mg/dL     CBC Auto Differential [[id number]]  (Normal) Collected: 12/28/23 1415    Specimen: Blood from  Arm, Right Updated: 12/28/23 1449     WBC 3.69 10*3/mm3      RBC 4.76 10*6/mm3      Hemoglobin 14.0 g/dL      Hematocrit 41.3 %      MCV 86.8 fL      MCH 29.4 pg      MCHC 33.9 g/dL      RDW 13.1 %      RDW-SD 41.2 fl      MPV 10.8 fL      Platelets 141 10*3/mm3      Neutrophil % 56.4 %      Lymphocyte % 33.9 %      Monocyte % 7.0 %      Eosinophil % 1.6 %      Basophil % 1.1 %      Immature Grans % 0.0 %      Neutrophils, Absolute 2.08 10*3/mm3      Lymphocytes, Absolute 1.25 10*3/mm3      Monocytes, Absolute 0.26 10*3/mm3      Eosinophils, Absolute 0.06 10*3/mm3      Basophils, Absolute 0.04 10*3/mm3      Immature Grans, Absolute 0.00 10*3/mm3      nRBC 0.0 /100 WBC     Urine Drug Screen - Urine, Clean Catch [[id number]]  (Abnormal) Collected: 12/28/23 1557    Specimen: Urine, Clean Catch Updated: 12/28/23 1639     Amphet/Methamphet, Screen Positive     Barbiturates Screen, Urine Negative     Benzodiazepine Screen, Urine Negative     Cocaine Screen, Urine Negative     Opiate Screen Positive     THC, Screen, Urine Negative     Methadone Screen, Urine Positive     Oxycodone Screen, Urine Negative     Fentanyl, Urine Positive    Narrative:      Negative Thresholds Per Drugs Screened:    Amphetamines                 500 ng/ml  Barbiturates                 200 ng/ml  Benzodiazepines              100 ng/ml  Cocaine                      300 ng/ml  Methadone                    300 ng/ml  Opiates                      300 ng/ml  Oxycodone                    100 ng/ml  THC                           50 ng/ml  Fentanyl                       5 ng/ml      The Normal Value for all drugs tested is negative. This report includes final unconfirmed screening results to be used for medical treatment purposes only. Unconfirmed results must not be used for non-medical purposes such as employment or legal testing. Clinical consideration should be applied to any drug of abuse test, particularly when unconfirmed results are used.             POC Glucose Once [[id number]]  (Abnormal) Collected: 12/28/23 1640    Specimen: Blood Updated: 12/28/23 1642     Glucose 116 mg/dL      Comment: Serial Number: [id number]:  [id number]                Imaging:    XR Spine Lumbar 2 or 3 View    Result Date: 12/28/2023  PROCEDURE: XR SPINE LUMBAR 2 OR 3 VW  COMPARISON: [facility], CR, XR SPINE LUMBAR COMPLETE 4+VW, 12/19/2023, 13:34.  INDICATIONS: LOW BACK PAIN TODAY - OLD INJURIES - NO NEW INJURY  FINDINGS:  There is a chronic compression fracture of L1, unchanged.  No acute fracture is identified.  Mild degenerative disc changes are noted in the mid and lower lumbar spine.        1. Stable L1 compression fracture 2. Mild degenerative disc changes in the mid to lower lumbar spine     Edward Hicks M.D.       Electronically Signed and Approved By: Edward Hicks M.D. on 12/28/2023 at 20:00                Differential Diagnosis and Discussion:      Psychiatric: Differential diagnosis includes but is not limited to depression, psychosis, bipolar disorder, anxiety, manic episode, schizophrenia, and substance abuse.    All labs were reviewed and interpreted by me.  All X-rays impressions were independently interpreted by me.    MDM     Amount and/or Complexity of Data Reviewed  Clinical lab tests: reviewed  Tests in the radiology section of CPT®: reviewed       The patient´s symptoms are consistent with musculoskeletal back pain.  The patient´s CBC that was reviewed and interpreted by me shows no abnormalities of critical concern. Of note, there is no anemia requiring a blood transfusion and the platelet count is acceptable.  The patient´s CMP that was reviewed and interpretted by me shows no abnormalities of critical concern. Of note, the patient´s sodium and potassium are acceptable. The patient´s liver enzymes are unremarkable. The patient´s renal function (creatinine) is preserved. The patient has a normal anion gap.  X-ray shows no acute fractures.  I  did have the patient evaluated by  and they have given him resources for help with drugs.  The patient is now resting comfortably, feels better, is alert, talkative, interactive and in no distress. The repeat examination is unremarkable and benign. The patient is neurologically intact and is ambulatory in the ED. The patient has no fever, no bowel or bladder incontinence, no saddle anesthesia, and is otherwise alert and well appearing. The history, physical exam, and diagnostics (if any) do not suggest the presence of acute spinal epidural abscess, acute spinal epidural bleed, cauda equina syndrome, abdominal aortic aneurysm, aortic dissection or other process requiring further testing, treatment or consultation in the emergency department. The vital signs have been stable. The patient's condition is stable and appropriate for discharge. The patient will pursue further outpatient evaluation with the primary care physician or other designated for consulting position as indicated in the discharge instructions.          Patient Care Considerations:    NARCOTICS: I considered prescribing opiate pain medication as an outpatient, however patient does want help with narcotics.      Consultants/Shared Management Plan:    None    Social Determinants of Health:    Patient is independent, reliable, and has access to care.       Disposition and Care Coordination:    Discharged: I considered escalation of care by admitting this patient for observation, however the patient has improved and is suitable and  stable for discharge.    I have explained the patient´s condition, diagnoses and treatment plan based on the information available to me at this time. I have answered questions and addressed any concerns. The patient has a good  understanding of the patient´s diagnosis, condition, and treatment plan as can be expected at this point. The vital signs have been stable. The patient´s condition is stable and  appropriate for discharge from the emergency department.      The patient will pursue further outpatient evaluation with the primary care physician or other designated or consulting physician as outlined in the discharge instructions. They are agreeable to this plan of care and follow-up instructions have been explained in detail. The patient has received these instructions in written format and have expressed an understanding of the discharge instructions. The patient is aware that any significant change in condition or worsening of symptoms should prompt an immediate return to this or the closest emergency department or call to 911.  I have explained discharge medications and the need for follow up with the patient/caretakers. This was also printed in the discharge instructions. Patient was discharged with the following medications and follow up:      Medication List        New Prescriptions      cyclobenzaprine 10 MG tablet  Commonly known as: FLEXERIL  Take 1 tablet by mouth 3 (Three) Times a Day.               Where to Get Your Medications        These medications were sent to [id number] - [place] [id number] AT [facility] OF [address] [phone] [facility] [phone]   [address] [facility] [address]      Phone: [phone]   cyclobenzaprine 10 MG tablet      Ronen Ohara MD  [address] [place] [address] [phone]             Final diagnoses:   Back pain, unspecified back location, unspecified back pain laterality, unspecified chronicity        ED Disposition       ED Disposition   Discharge    Condition   Stable    Comment   --               This medical record created using voice recognition software.             Jose Roy MD  12/28/23 2049

## 2023-12-29 NOTE — SIGNIFICANT NOTE
12/28/23 2032   Plan   Plan Comments SW met with patient who requested outpatient resource options for substance use treatment. Pt is not interested in inpatient treatment at this time. However, SW was able to provide a list of hospital detox, residential/[facility] houses and outpatient options with contact information. No further needs at this time.   Final Discharge Disposition Code 01 - home or self-care

## 2024-01-12 ENCOUNTER — PROCEDURE VISIT (OUTPATIENT)
Dept: UROLOGY | Facility: CLINIC | Age: 54
End: 2024-01-12
Payer: COMMERCIAL

## 2024-01-12 DIAGNOSIS — R33.9 URINARY RETENTION: ICD-10-CM

## 2024-01-12 PROCEDURE — 51797 INTRAABDOMINAL PRESSURE TEST: CPT | Performed by: NURSE PRACTITIONER

## 2024-01-12 PROCEDURE — 51728 CYSTOMETROGRAM W/VP: CPT | Performed by: NURSE PRACTITIONER

## 2024-01-12 PROCEDURE — 51784 ANAL/URINARY MUSCLE STUDY: CPT | Performed by: NURSE PRACTITIONER

## 2024-01-17 ENCOUNTER — OFFICE VISIT (OUTPATIENT)
Dept: PODIATRY | Facility: CLINIC | Age: 54
End: 2024-01-17
Payer: COMMERCIAL

## 2024-01-17 VITALS
OXYGEN SATURATION: 98 % | BODY MASS INDEX: 18.94 KG/M2 | HEART RATE: 85 BPM | SYSTOLIC BLOOD PRESSURE: 101 MMHG | WEIGHT: 125 LBS | HEIGHT: 68 IN | TEMPERATURE: 97.5 F | DIASTOLIC BLOOD PRESSURE: 81 MMHG

## 2024-01-17 DIAGNOSIS — I73.9 PERIPHERAL VASCULAR DISEASE: ICD-10-CM

## 2024-01-17 DIAGNOSIS — L97.525 ULCER OF LEFT FOOT WITH MUSCLE INVOLVEMENT WITHOUT EVIDENCE OF NECROSIS: Primary | ICD-10-CM

## 2024-01-17 DIAGNOSIS — G62.9 PERIPHERAL POLYNEUROPATHY: ICD-10-CM

## 2024-01-18 NOTE — PROGRESS NOTES
"    [facility] - PODIATRY    Today's Date: 01/18/24    Patient Name: Haider Jacobs  MRN: [id number]  CSN: [id number]  PCP: Ronen Ohara MD,   Referring Provider: Sanjuana Hoffman,*    SUBJECTIVE     Chief Complaint   Patient presents with    Left Foot - Foot Ulcer     Redness and soreness x 4-6 weeks.  Suffers from a spinal cord injury.     HPI: Haider Jacobs, a 53 y.o.male, presents to clinic.     Patient is a 53-year-old male presenting with an ulcer to the outside of his left foot.  Patient had a spinal cord injury several years ago.  He had a surgery to help with dropfoot on his left foot.  Started noticing this ulcer on his foot several weeks ago it is dime sized on the outside it is painful to his left foot.    Past Medical History:   Diagnosis Date    Anxiety     Chronic tetraplegia     Contracture of hand     LEFT HAND TIGHT HAND CONTRACTURE. RIGHT HAD CAN OPEN 30%.    Drug abuse     Hematuria     Neurogenic bladder     Spinal cord injury at C1-C4 level without spinal injury     Urinary retention      Past Surgical History:   Procedure Laterality Date    ANKLE SURGERY      CERVICAL FUSION      C2-C7    FRACTURE SURGERY      ARM    URETEROSCOPY LASER LITHOTRIPSY WITH STENT INSERTION Bilateral 11/21/2023    Procedure: CYSTOSCOPY BILATERAL RETROGRADE PYELOGRAM BLADDER STONE EXTRACTION;  Surgeon: Jennifer Bowman MD;  Location: [facility];  Service: Urology;  Laterality: Bilateral;     History reviewed. No pertinent family history.  Social History     Socioeconomic History    Marital status:    Tobacco Use    Smoking status: Every Day     Packs/day: 1     Types: Cigarettes     Passive exposure: Current    Smokeless tobacco: Never   Vaping Use    Vaping Use: Never used   Substance and Sexual Activity    Alcohol use: Not Currently    Drug use: Yes     Comment: fentanyl powder /percocets \"off the street\" for managing pain    Sexual activity: Defer     Allergies   Allergen " Reactions    Varenicline Other (See Comments)     dizziness     Current Outpatient Medications   Medication Sig Dispense Refill    baclofen (LIORESAL) 20 MG tablet Take 1 tablet by mouth 2 (Two) Times a Day. Takes 2 tablets      BIOTIN PO Take  by mouth.      busPIRone (BUSPAR) 15 MG tablet buspirone 15 mg oral tablet take 1 tablet (15 mg) by oral route 2 times per day   Active      furosemide (LASIX) 20 MG tablet Take 1 tablet by mouth Daily.      hydrOXYzine (ATARAX) 50 MG tablet Take 1 tablet by mouth Daily As Needed for Itching.      omeprazole (priLOSEC) 40 MG capsule Take 1 capsule by mouth Daily.      QUEtiapine Fumarate (SEROQUEL PO) Take 400 mg by mouth Every Night.      tamsulosin (FLOMAX) 0.4 MG capsule 24 hr capsule Take 2 capsules by mouth Daily for 360 days. 180 capsule 3    traZODone (DESYREL) 100 MG tablet 1 tablet every night at bedtime.      cyclobenzaprine (FLEXERIL) 10 MG tablet Take 1 tablet by mouth 3 (Three) Times a Day. (Patient not taking: Reported on 1/17/2024) 20 tablet 0    mupirocin (BACTROBAN) 2 % ointment Apply 1 application  topically to the appropriate area as directed 2 (Two) Times a Day. 60 g 5     No current facility-administered medications for this visit.     Review of Systems   All other systems reviewed and are negative.      OBJECTIVE     Vitals:    01/17/24 0817   BP: 101/81   Pulse: 85   Temp: 97.5 °F (36.4 °C)   SpO2: 98%       WBC   Date Value Ref Range Status   12/28/2023 3.69 3.40 - 10.80 10*3/mm3 Final   12/25/2019 10.53 4.5 - 11.0 10*3/uL Final     RBC   Date Value Ref Range Status   12/28/2023 4.76 4.14 - 5.80 10*6/mm3 Final   12/25/2019 4.28 (L) 4.5 - 5.9 10*6/uL Final     Hemoglobin   Date Value Ref Range Status   12/28/2023 14.0 13.0 - 17.7 g/dL Final   12/25/2019 13.5 13.5 - 17.5 g/dL Final     Hematocrit   Date Value Ref Range Status   12/28/2023 41.3 37.5 - 51.0 % Final   12/25/2019 40.6 (L) 41.0 - 53.0 % Final     MCV   Date Value Ref Range Status   12/28/2023  86.8 79.0 - 97.0 fL Final   12/25/2019 94.9 80.0 - 100.0 fL Final     MCH   Date Value Ref Range Status   12/28/2023 29.4 26.6 - 33.0 pg Final   12/25/2019 31.5 26.0 - 34.0 pg Final     MCHC   Date Value Ref Range Status   12/28/2023 33.9 31.5 - 35.7 g/dL Final   12/25/2019 33.3 31.0 - 37.0 g/dL Final     RDW   Date Value Ref Range Status   12/28/2023 13.1 12.3 - 15.4 % Final   12/25/2019 14.4 12.0 - 16.8 % Final     RDW-SD   Date Value Ref Range Status   12/28/2023 41.2 37.0 - 54.0 fl Final     MPV   Date Value Ref Range Status   12/28/2023 10.8 6.0 - 12.0 fL Final   12/25/2019 12.0 (H) 6.7 - 10.8 fL Final     Platelets   Date Value Ref Range Status   12/28/2023 141 140 - 450 10*3/mm3 Final   12/25/2019 129 (L) 140 - 440 10*3/uL Final     Neutrophil Rel %   Date Value Ref Range Status   12/25/2019 71.3 45 - 80 % Final     Neutrophil %   Date Value Ref Range Status   12/28/2023 56.4 42.7 - 76.0 % Final     Lymphocyte Rel %   Date Value Ref Range Status   12/25/2019 15.3 15 - 50 % Final     Lymphocyte %   Date Value Ref Range Status   12/28/2023 33.9 19.6 - 45.3 % Final     Monocyte Rel %   Date Value Ref Range Status   12/25/2019 10.8 0 - 15 % Final     Monocyte %   Date Value Ref Range Status   12/28/2023 7.0 5.0 - 12.0 % Final     Eosinophil %   Date Value Ref Range Status   12/28/2023 1.6 0.3 - 6.2 % Final   12/25/2019 2.1 0 - 7 % Final     Basophil Rel %   Date Value Ref Range Status   12/25/2019 0.3 0 - 2 % Final     Basophil %   Date Value Ref Range Status   12/28/2023 1.1 0.0 - 1.5 % Final     Immature Grans %   Date Value Ref Range Status   12/28/2023 0.0 0.0 - 0.5 % Final   12/25/2019 0.2 (H) 0 % Final     Neutrophils Absolute   Date Value Ref Range Status   12/25/2019 7.51 2.0 - 8.8 10*3/uL Final     Neutrophils, Absolute   Date Value Ref Range Status   12/28/2023 2.08 1.70 - 7.00 10*3/mm3 Final     Lymphocytes Absolute   Date Value Ref Range Status   12/25/2019 1.61 0.7 - 5.5 10*3/uL Final      Lymphocytes, Absolute   Date Value Ref Range Status   12/28/2023 1.25 0.70 - 3.10 10*3/mm3 Final     Monocytes Absolute   Date Value Ref Range Status   12/25/2019 1.14 0.0 - 1.7 10*3/uL Final     Monocytes, Absolute   Date Value Ref Range Status   12/28/2023 0.26 0.10 - 0.90 10*3/mm3 Final     Eosinophils Absolute   Date Value Ref Range Status   12/25/2019 0.22 0.0 - 0.8 10*3/uL Final     Eosinophils, Absolute   Date Value Ref Range Status   12/28/2023 0.06 0.00 - 0.40 10*3/mm3 Final     Basophils Absolute   Date Value Ref Range Status   12/25/2019 0.03 0.0 - 0.2 10*3/uL Final     Basophils, Absolute   Date Value Ref Range Status   12/28/2023 0.04 0.00 - 0.20 10*3/mm3 Final     Immature Grans, Absolute   Date Value Ref Range Status   12/28/2023 0.00 0.00 - 0.05 10*3/mm3 Final   12/25/2019 0.02 <1 10*3/uL Final     nRBC   Date Value Ref Range Status   12/28/2023 0.0 0.0 - 0.2 /100 WBC Final         Lab Results   Component Value Date    GLUCOSE 118 (H) 12/28/2023    BUN 19 12/28/2023    CREATININE 0.96 12/28/2023    EGFRIFNONA 96 02/07/2022    BCR 19.8 12/28/2023    K 3.7 12/28/2023    CO2 29.9 (H) 12/28/2023    CALCIUM 9.6 12/28/2023    ALBUMIN 4.4 12/28/2023    LABIL2 1.5 05/27/2020    AST 13 12/28/2023    ALT 6 12/28/2023       Patient seen in no apparent distress.      PHYSICAL EXAM:     Foot/Ankle Exam     NEUROLOGIC     Right Foot Neurologic   Light touch sensation: absent  Vibratory sensation: absent  Hot/Cold sensation: absent     Left Foot Neurologic   Light touch sensation: absent  Vibratory sensation: absent  Hot/Cold sensation:  absent     Left foot additional comments: Dropfoot to left foot, ulcer to fifth metatarsal base granular with mild necrotic base approximately dime sized.  No erythema.  Pain on palpation.      RADIOLOGY:          XR Spine Lumbar 2 or 3 View    Result Date: 12/28/2023  Narrative: PROCEDURE: XR SPINE LUMBAR 2 OR 3 VW  COMPARISON: [facility], CR, XR SPINE LUMBAR  COMPLETE 4+VW, 12/19/2023, 13:34.  INDICATIONS: LOW BACK PAIN TODAY - OLD INJURIES - NO NEW INJURY  FINDINGS:  There is a chronic compression fracture of L1, unchanged.  No acute fracture is identified.  Mild degenerative disc changes are noted in the mid and lower lumbar spine.      Impression:   1. Stable L1 compression fracture 2. Mild degenerative disc changes in the mid to lower lumbar spine     Edward Hicks M.D.       Electronically Signed and Approved By: Edward Hicks M.D. on 12/28/2023 at 20:00             XR Spine Lumbar Complete 4+VW    Result Date: 12/19/2023  Narrative: PROCEDURE: XR SPINE LUMBAR COMPLETE 4+VW  COMPARISON: [facility], CT, CT ABDOMEN PELVIS W CONTRAST, 10/19/2023, 15:52.  [facility], CR, XR CHEST PA AND LATERAL, 12/19/2023, 13:29.  [facility], CR, LS-SPINE - AP & LAT, 12/05/2020, 9:06.  INDICATIONS: low back pain unspecified  FINDINGS:  No acute fracture or destructive bone lesion.  Stable T10, T11, and L1 compression fractures.  Mild degenerative disc disease at L3-L4 through L5-S1.      Impression:    1. No acute abnormality  2. Stable T10, T11, and L1 compression fractures  3. Degenerative disc disease in the lower lumbar spine     CHRISS HAQ MD       Electronically Signed and Approved By: CHRISS HAQ MD on 12/19/2023 at 15:30             XR Chest PA & Lateral    Result Date: 12/19/2023  Narrative: PROCEDURE: XR CHEST PA AND LATERAL  COMPARISON: [facility], CR, CHEST AP/PA 1 VIEW, 12/23/2019, 8:44.  INDICATIONS: dyspnea  FINDINGS:  Heart size and pulmonary vasculature within normal limits.  Lungs clear.  Costophrenic angles sharp      Impression:  No evidence of pneumonia     CHRISS HAQ MD       Electronically Signed and Approved By: CHRISS HAQ MD on 12/19/2023 at 15:16              ASSESSMENT/PLAN     Diagnoses and all orders for this visit:    1. Ulcer of left foot with muscle involvement without evidence of  necrosis (Primary)  -     Doppler Arterial Multi Level Lower Extremity - Bilateral CAR; Future  -     Miscellaneous DME  -     Miscellaneous DME    2. Peripheral vascular disease  -     Doppler Arterial Multi Level Lower Extremity - Bilateral CAR; Future    3. Peripheral polyneuropathy  -     Miscellaneous DME    Other orders  -     mupirocin (BACTROBAN) 2 % ointment; Apply 1 application  topically to the appropriate area as directed 2 (Two) Times a Day.  Dispense: 60 g; Refill: 5    Daily dressing changes  Offload left foot  Arterial studies ordered  Follow-up after arterial studies      Comprehensive lower extremity examination and evaluation was performed.    Discussed findings and treatment plan including risks, benefits, and treatment options with patient in detail. Patient agreed with treatment plan.    Medications and allergies reviewed.  Reviewed available lab values along with other pertinent labs.  These were discussed with the patient.    An After Visit Summary was printed and given to the patient at discharge, including (if requested) any available informative/educational handouts regarding diagnosis, treatment, or medications. All questions were answered to patient/family satisfaction. Should symptoms fail to improve or worsen they agree to call or return to clinic or to go to the Emergency Department. Discussed the importance of following up with any needed screening tests/labs/specialist appointments and any requested follow-up recommended by me today. Importance of maintaining follow-up discussed and patient accepts that missed appointments can delay diagnosis and potentially lead to worsening of conditions.    No follow-ups on file., or sooner if acute issues arise.    This document has been electronically signed by William Mills DPM on January 18, 2024 09:03 EST

## 2024-01-22 ENCOUNTER — TELEPHONE (OUTPATIENT)
Dept: UROLOGY | Facility: CLINIC | Age: 54
End: 2024-01-22
Payer: COMMERCIAL

## 2024-01-22 DIAGNOSIS — R30.0 DYSURIA: Primary | ICD-10-CM

## 2024-01-22 RX ORDER — SULFAMETHOXAZOLE AND TRIMETHOPRIM 800; 160 MG/1; MG/1
1 TABLET ORAL 2 TIMES DAILY
Qty: 14 TABLET | Refills: 0 | Status: SHIPPED | OUTPATIENT
Start: 2024-01-22 | End: 2024-01-26 | Stop reason: SDUPTHER

## 2024-01-22 NOTE — TELEPHONE ENCOUNTER
Please advise patient that I have sent an order for a urine culture. He can go to any [facility] lab to give a urine specimen. Given he had the procedure and is having symptoms, I am going ahead and sending in an antibiotic to his pharmacy. He may have to change antibiotics based on culture results. He should go give the urine sample before starting the antibiotics.

## 2024-01-22 NOTE — TELEPHONE ENCOUNTER
Called pt and let him know that he needs to go to a [facility] and collect a urine culture and once he has collected that then he can go to his pharmacy and  the ABX that Jerica sent in to his pharmacy, but not to start the ABX until he collects the culture. Pt verbalized understanding and thanked me for calling. I let him know that once we have results that I would call him back and will take at least 48 hrs for results.

## 2024-01-22 NOTE — TELEPHONE ENCOUNTER
PATIENT CALLED AND SAID HE HAD UDS ON 01/12/24.    HE SAID HE HAS GOTTEN A VERY BAD UTI FROM IT.  SYMPTOMS STARTED FRIDAY.    HE HAS CHILLS, BAD PAIN WHEN HE PEES, AND HE SAID THERE IS A LOT OF WHITE BACTERIA AND INFECTION IN URINE AND IT IS TURNING BROWN.    HE DOESN'T KNOW IF HE HAS A FEVER.

## 2024-01-25 NOTE — PROGRESS NOTES
Chief Complaint: Urinary Retention    Subjective         History of Present Illness  Haider Jacobs is a 53 y.o. male presents to [facility] to be seen for follow-up UDS.    Patient was previously seen by me with last visit on 12/22/2023 for BPH and urinary retention.  He is here today to follow-up on his urodynamic studies.    Patient has been having rectal pain and feels like he is sitting on a ball. He started on Bactrim 2 days ago for UTI after his urodynamics. He reports the pain has been going on for the last couple of months.     Urodynamic findings   1.  Incomplete bladder emptying  2.  Weak urine stream  3.  Urinary retention    PSA  11/1/2023 1.4    Previous 12/22/2023:  Patient was previously seen by me with last visit on 11/8/2023 for BPH/urinary retention at that visit we discussed that he should be voiding every 3 hours to try to keep his bladder empty.  He did increase his tamsulosin to 2 capsules daily and then had a cystoscopy with bilateral retrogrades per Dr. Bowman.  His PVR at his last visit was 171.     Cystoscopy report:  A 22 Indonesian rigid cystoscope was inserted into the bladder.  The bladder was inspected in a systemic meridian fashion using a 30  degree lens. There were no tumors seen but the patient did have diffuse trabeculations as well as stone broad-based diverticuli.  This was consistent with his neurogenic bladder and urinary retention.  The bladder looked diffusely abnormal but no tumors were seen.  There was very small stones and sludge in the bottom of the bladder.  These were irrigated out and removed using the scope..  Both ureteral orifices were normal in appearance but were on an elevated trigone again due to the trabeculations.       Bilateral retrograde pyelograms were then performed, first on the patient's left side and then on the right.  There were no filling defects, tumors, stones or other abnormalities seen except for the fact that the ureters  "were angulated due to the trabeculations of the bladder.  The bladder was emptied and the cystoscope removed.          Previous 11/8/2023:  Patient was previously seen by me with last visit on 8/8/2023 for BPH/urinary retention.  We did discuss at that visit that he needs to set an alarm to void at least every 3 hours to ensure he is emptying his bladder well.  We also discussed that he did need to get his PSA prior to this appointment.  His PVR at that visit was 102.  He previously had done CIC so is well versed with doing this as needed.  Patient was seen in the ER on 10/19/2023 for blood in his urine.  He did have a CT scan completed at that visit.  He is here for follow-up.        He states he is doing better with urination since starting on tamsulosin. He does not get the sensation to void but does \"bladder tapping\" to help him void. He does reports he wears depends at night and it is full in the morning. He is not doing timed voiding.            PROCEDURE:  CT ABDOMEN PELVIS W CONTRAST     COMPARISON: [facility], CT, CT ABDOMEN PELVIS W CONTRAST, 6/17/2023, 11:22.     INDICATIONS:  abdominal pain, hematuria at home x1 day, hx spinal cord injury     TECHNIQUE:    After obtaining the patient's consent, CT images were created with non-ionic intravenous   contrast material.       PROTOCOL:     Standard imaging protocol performed                 RADIATION:      DLP: 426.1 mGy*cm               Automated exposure control was utilized to minimize radiation dose.   CONTRAST:      75 cc Isovue 370 I.V.  LABS:   eGFR: 95.1 ml/min/1.73m2     FINDINGS:          The lung bases are stable.  There is mild atelectasis.  There is dystrophic soft tissue   calcifications in the right flank soft tissues.  There is mild edema in the soft tissues.  There is   mild intra and extrahepatic biliary dilation.  Extrahepatic bile duct measures up to about 0.8   centimeters.  The gallbladder demonstrates no acute finding.  " Spleen appears similar to previous.    No acute pancreatic finding.  No adrenal finding.  There are several bilateral renal cysts.  There   is no obstructive uropathy.  Ureters are difficult to follow due to lack of intra-abdominal fat.      There are multiple phleboliths.  There is trabeculations involving the bladder wall.  Bladder is   mildly distended There is a fold or septation in the bladder.  Portions the bladder wall mildly   thickened.  No significant inflammation.  Bladder is mildly distended.  There is echogenic material   seen with lumen of the dependent bladder measuring 3.4 x 1.6 centimeter.  There is an irregular   contour of the bladder.  There is moderate to large amount of stool throughout the colon.  There is   no inflammatory process.  Appendix not well visualized not clearly abnormal.  Mild atherosclerosis.    No enlarged adenopathy.  Mild Prostatomegaly slightly impinging on the bladder base.  Mild   levocurvature in the spine; there is a mild compression deformity of L2 is stable.  There is mild   to moderate compression deformity of L1.  Mild compression deformity of T11 and T10 also appears   similar.  Degenerative changes seen in the lumbar spine.  A trace amount of pelvic ascites.     IMPRESSION:                 1. No renal calculi.  No obstructive uropathy.  Ureters are difficult to follow due to lack of   intra-abdominal fat.  There is multiple phleboliths in the pelvis.  2. 3.4 x 1.6 centimeter collect of echogenic debris or cluster of tiny stones in the dependent left   aspect of the bladder.  This appears new compared to prior CT 6/17/2023.    3. Bladder is mildly distended.  There are multiple bladder trabeculations which appear new.    Portions of the bladder wall could be mildly thickened.  Correlate with urinalysis to exclude   cystitis.  Follow-up cystoscopy may be useful.  4. Trace pelvic ascites.  5. Moderate to large amount of stool throughout the colon.  6. There is mild  intra and extrahepatic biliary dilation.  No cholelithiasis.  Correlate with   history and lab values and follow-up with MRCP if indicated clinically.  Findings are similar to   prior CT scan.  7. Multiple compression deformities in the lower thoracic and lumbar spine greatest at L1 similar   to prior CT 6/17/2023 likely old compression fractures.            SHAYNA GOTTI MD         Electronically Signed and Approved By: SHAYNA GOTTI MD on 10/19/2023 at 16:40        PSA  11/1/2023 1.4     Urine culture  10/19/2023 greater than 100,000 colony-forming's per mL Morganella Morgagni, resistant to ampicillin, Unasyn, cefazolin, nitrofurantoin, Zosyn otherwise sensitive; #2 greater than 100,000 colony-forming's per mL Klebsiella oxytoca resistant to ampicillin, intermediate to Unasyn, otherwise sensitive  8/8/2023 greater than 100 Thousand colony-forming units per mL mixed yady     UA with microscopy  10/19/2023 RBCs 6-10 per high-powered field, WBCs too numerous to count, bacteria 4+           Previous 8/8/2023:   Patient was previously seen by me with last visit on 7/11/2023 for BPH/urinary retention.  We did start him on tamsulosin and had him come back 1 week later for a void trial he is here for follow-up. He reports tamsulosin is helping with his symptoms. He reports he is going more frequently during the night since starting tamsulosin. He does report he is having some bladder spasms, burning with urination and foul odor to urine.      Previous 7/11/2023:  Patient was seen in the ER on 6/17/2023 with lower abdominal pain and foul-smelling urine.  Patient is paraplegic from spinal cord injury and has difficulty with urination but does not require self-catheterization.Patient was sent home with a Jaquez catheter and he is here for urinary retention follow-up.     Patient reports he was having a lot of bladder pain. He was seen in ER and was treated with a jaquez catheter. He reports he is still having bladder  pain when he touches his lower abdomen and when laying on back. He reports he was able to void prior to having the catheter placed. He reports it took 3 tries to get the catheter placed. He reports he was having burning with urination and urinary frequency. He reports he was tested for UTI in the ER and was negative. He reports that he does have urethral pain as well that started prior to having the jaquez catheter placed. He did have to do CIC after he had his accident which caused him to be paraplegic.         CT Abdomen Pelvis With Contrast     Result Date: 6/17/2023  PROCEDURE:            CT ABDOMEN PELVIS W CONTRAST  COMPARISON:    [facility], CT, CT ABDOMEN PELVIS W CONTRAST, 2/04/2022, 16:38.  INDICATIONS:          GENERALIZED Abdominal pain  TECHNIQUE:         After obtaining the patient's consent, CT images were created with non-ionic intravenous contrast material.   PROTOCOL:             Standard imaging protocol performed    RADIATION:              DLP: 534.7 mGy*cm   Automated exposure control was utilized to minimize radiation dose. CONTRAST:     75 cc Isovue 370 I.V.  FINDINGS:   Minimal density in the left lung base is consistent with scarring or atelectasis.  The liver, gallbladder, spleen, pancreas and adrenal glands appear unremarkable.  Arising from the right kidneys inferior pole is a 1.1 cm cyst.  In the mid left kidney is a 1.3 cm cyst.  There are sub cm hypodense foci elsewhere in the kidneys likely representing small cysts but too small for definitive characterization.  No adenopathy is seen in the abdomen or pelvis.  The urinary bladder is mildly distended but otherwise appears unremarkable.  Multiple pelvic calcifications are consistent with phleboliths.  No convincing ureteral stone is seen.  There is a small to moderate amount of stool in the large bowel.  The bowel otherwise appears unremarkable.  The lack of oral contrast limits evaluation of the bowel.  No hernia is  identified.  The appendix is not well seen.  No overt inflammatory changes identified in the right lower quadrant.  There are compression fractures of L1 and T11 which are chronic and unchanged.  No acute fracture is seen.                    1. Small to moderate amount of stool in the large bowel.  Correlate clinically for constipation. 2. Chronic T11 and L1 compression fractures 3. Multiple suspected bilateral renal cysts     Edward Hicks M.D.       Electronically Signed and Approved By: Edward Hicks M.D. on 6/17/2023 at 12:29                  Objective     Past Medical History:   Diagnosis Date    Anxiety     Chronic tetraplegia     Contracture of hand     LEFT HAND TIGHT HAND CONTRACTURE. RIGHT HAD CAN OPEN 30%.    Drug abuse     Hematuria     Neurogenic bladder     Spinal cord injury at C1-C4 level without spinal injury     Urinary retention        Past Surgical History:   Procedure Laterality Date    ANKLE SURGERY      CERVICAL FUSION      C2-C7    FRACTURE SURGERY      ARM    URETEROSCOPY LASER LITHOTRIPSY WITH STENT INSERTION Bilateral 11/21/2023    Procedure: CYSTOSCOPY BILATERAL RETROGRADE PYELOGRAM BLADDER STONE EXTRACTION;  Surgeon: Jennifer Bowman MD;  Location: [facility];  Service: Urology;  Laterality: Bilateral;         Current Outpatient Medications:     baclofen (LIORESAL) 20 MG tablet, Take 1 tablet by mouth 2 (Two) Times a Day. Takes 2 tablets, Disp: , Rfl:     BIOTIN PO, Take  by mouth., Disp: , Rfl:     busPIRone (BUSPAR) 15 MG tablet, buspirone 15 mg oral tablet take 1 tablet (15 mg) by oral route 2 times per day   Active, Disp: , Rfl:     furosemide (LASIX) 20 MG tablet, Take 1 tablet by mouth Daily., Disp: , Rfl:     hydrOXYzine (ATARAX) 50 MG tablet, Take 1 tablet by mouth Daily As Needed for Itching., Disp: , Rfl:     mupirocin (BACTROBAN) 2 % ointment, Apply 1 application  topically to the appropriate area as directed 2 (Two) Times a Day., Disp: 60 g, Rfl: 5    omeprazole  "(priLOSEC) 40 MG capsule, Take 1 capsule by mouth Daily., Disp: , Rfl:     QUEtiapine Fumarate (SEROQUEL PO), Take 400 mg by mouth Every Night., Disp: , Rfl:     sulfamethoxazole-trimethoprim (Bactrim DS) 800-160 MG per tablet, Take 1 tablet by mouth 2 (Two) Times a Day for 21 days., Disp: 42 tablet, Rfl: 0    tamsulosin (FLOMAX) 0.4 MG capsule 24 hr capsule, Take 2 capsules by mouth Daily for 360 days., Disp: 180 capsule, Rfl: 3    traZODone (DESYREL) 100 MG tablet, 1 tablet every night at bedtime., Disp: , Rfl:     cyclobenzaprine (FLEXERIL) 10 MG tablet, Take 1 tablet by mouth 3 (Three) Times a Day. (Patient not taking: Reported on 1/17/2024), Disp: 20 tablet, Rfl: 0    Lactobacillus (Florajen Acidophilus) capsule, Take 1 capsule by mouth Daily., Disp: 28 capsule, Rfl: 0    Allergies   Allergen Reactions    Varenicline Other (See Comments)     dizziness        History reviewed. No pertinent family history.    Social History     Socioeconomic History    Marital status:    Tobacco Use    Smoking status: Every Day     Packs/day: 1     Types: Cigarettes     Passive exposure: Current    Smokeless tobacco: Never   Vaping Use    Vaping Use: Never used   Substance and Sexual Activity    Alcohol use: Not Currently    Drug use: Yes     Comment: fentanyl powder /percocets \"off the street\" for managing pain    Sexual activity: Defer       Vital Signs:   /80 (BP Location: Right arm, Patient Position: Sitting, Cuff Size: Adult)   Pulse 81   Ht 172.7 cm (68\")   Wt 56.7 kg (125 lb)   BMI 19.01 kg/m²      Physical Exam  Vitals reviewed.   Constitutional:       Appearance: Normal appearance.   Neurological:      General: No focal deficit present.      Mental Status: He is alert and oriented to person, place, and time.   Psychiatric:         Mood and Affect: Mood normal.         Behavior: Behavior normal.          Result Review :   The following data was reviewed by: RAMANA Pendleton on " 01/26/2024:  Results for orders placed or performed in visit on 01/26/24   Bladder Scan   Result Value Ref Range    Urine Volume 387       PSA          11/1/2023    11:21   PSA   PSA 1.400          Bladder Scan interpretation 01/26/2024    Estimation of residual urine via BVI 3000 Verathon Bladder Scan  MA/nurse performing: Cielo IGLESIAS MA  Residual Urine: 387 ml  Indication: Urinary retention    Benign prostatic hyperplasia with lower urinary tract symptoms, symptom details unspecified    Prostatitis, unspecified prostatitis type   Position: Supine  Examination: Incremental scanning of the suprapubic area using 2.0 MHz transducer using copious amounts of acoustic gel.   Findings: An anechoic area was demonstrated which represented the bladder, with measurement of residual urine as noted. I inspected this myself. In that the residual urine was significant, refer to plan for treatment and plan necessary at this time.         Procedures        Assessment and Plan    Diagnoses and all orders for this visit:    1. Urinary retention (Primary)  -     Bladder Scan  -     US Renal Bilateral; Future    2. Benign prostatic hyperplasia with lower urinary tract symptoms, symptom details unspecified  -     US Renal Bilateral; Future    3. Prostatitis, unspecified prostatitis type  -     sulfamethoxazole-trimethoprim (Bactrim DS) 800-160 MG per tablet; Take 1 tablet by mouth 2 (Two) Times a Day for 21 days.  Dispense: 42 tablet; Refill: 0  -     Lactobacillus (Florajen Acidophilus) capsule; Take 1 capsule by mouth Daily.  Dispense: 28 capsule; Refill: 0    Results of urodynamics discussed with patient and significant other.  We did discuss that because of his urinary retention that we would need to monitor closely his kidney function as well as check for hydronephrosis.  We will plan to get a renal ultrasound in October since he had a CT scan in October of last year that did not show any hydronephrosis.  Will plan to do this once  a year.  He will also need to have chemistry for renal function at least yearly.  He does report that his PCP checks this every 6 months and he will bring a copy when he has his lab work completed.    Given that he has a symptom of feel like he is sitting on a ball and it is becoming more difficult for him to urinate even with bladder tapping, I am going to wait and treat him with a 4-month long course of antibiotics for possible prostatitis.  We did discuss that urinary symptoms are much more common in men as they age because of the size of the prostate.  He is currently on tamsulosin and will continue with this medication.    At this time he is not interested in doing CIC though he does know how to do it and his significant other does have catheters at home and can do the procedure if needed if he is not able to void.  He does understand that it is risking his bladder health as well as kidney function if he is not able to empty his bladder.  We did discuss the possibility of twice daily catheterization but he is not interested in this as he got multiple UTIs when he was previously doing CIC.    I will see him back in 3 months to see if he is having improved symptoms from the prostatitis.    Follow Up   Return in about 3 months (around 4/26/2024).  Patient was given instructions and counseling regarding his condition or for health maintenance advice. Please see specific information pulled into the AVS if appropriate.         This document has been electronically signed by RAMANA Pendleton  January 26, 2024 13:32 EST

## 2024-01-26 ENCOUNTER — OFFICE VISIT (OUTPATIENT)
Dept: UROLOGY | Facility: CLINIC | Age: 54
End: 2024-01-26
Payer: COMMERCIAL

## 2024-01-26 VITALS
HEIGHT: 68 IN | HEART RATE: 81 BPM | BODY MASS INDEX: 18.94 KG/M2 | WEIGHT: 125 LBS | DIASTOLIC BLOOD PRESSURE: 80 MMHG | SYSTOLIC BLOOD PRESSURE: 127 MMHG

## 2024-01-26 DIAGNOSIS — N41.9 PROSTATITIS, UNSPECIFIED PROSTATITIS TYPE: ICD-10-CM

## 2024-01-26 DIAGNOSIS — R33.9 URINARY RETENTION: Primary | ICD-10-CM

## 2024-01-26 DIAGNOSIS — N40.1 BENIGN PROSTATIC HYPERPLASIA WITH LOWER URINARY TRACT SYMPTOMS, SYMPTOM DETAILS UNSPECIFIED: ICD-10-CM

## 2024-01-26 LAB — URINE VOLUME: 387

## 2024-01-26 RX ORDER — LACTOBACILLUS ACIDOPHILUS 20B CELL
1 CAPSULE ORAL DAILY
Qty: 28 CAPSULE | Refills: 0 | Status: SHIPPED | OUTPATIENT
Start: 2024-01-26

## 2024-01-26 RX ORDER — SULFAMETHOXAZOLE AND TRIMETHOPRIM 800; 160 MG/1; MG/1
1 TABLET ORAL 2 TIMES DAILY
Qty: 42 TABLET | Refills: 0 | Status: SHIPPED | OUTPATIENT
Start: 2024-01-26 | End: 2024-02-16

## 2024-04-26 ENCOUNTER — TELEPHONE (OUTPATIENT)
Dept: UROLOGY | Facility: CLINIC | Age: 54
End: 2024-04-26

## 2024-04-26 NOTE — TELEPHONE ENCOUNTER
Caller: AILYN GRACIA     Relationship: WIFE    Best call back number: [phone]     What is the best time to reach you: ANY    Who are you requesting to speak with (clinical staff, provider,  specific staff member): TAL OR STAFF    Do you know the name of the person who called: WIFE CALLED OFFICE    What was the call regarding: PT NEEDS TO RESCHEDULE APPT THAT WAS FOR 4/26/24 AND WILL NEED TO GET MORE FLOMAX UNTIL THAT TIME. PLEASE CALL PT TO RESCHEDULE APPT AND LET KNOW WHAT CAN BE DONE ABOUT FLOMAX UNTIL NEW APPT. PLEASE CALL WIFE CELL PHONE NUMBER FOR THIS.THANK YOU.

## 2024-04-26 NOTE — TELEPHONE ENCOUNTER
Called pt wife back Jamee and let her know that they just need to call their pharmacy and get a refill because last time he was seen by Jerica she sent in a year refill so he has plenty of refills, wife verbalized understanding. She also asked if 5/28/24 appt was still available to R/S from today and I let her know the only appt open that day is 0915 and she said that was too early. I told her I would have Temo from scheduling call her today and get that R/S as soon as she is available, and wife Jamee verbalized understanding and thanked me for calling her back.

## 2024-05-21 ENCOUNTER — OFFICE VISIT (OUTPATIENT)
Dept: WOUND CARE | Facility: HOSPITAL | Age: 54
End: 2024-05-21
Payer: COMMERCIAL

## 2024-05-21 VITALS
HEART RATE: 63 BPM | TEMPERATURE: 97.8 F | DIASTOLIC BLOOD PRESSURE: 78 MMHG | SYSTOLIC BLOOD PRESSURE: 120 MMHG | RESPIRATION RATE: 16 BRPM

## 2024-05-21 DIAGNOSIS — S71.101A OPEN WOUND OF RIGHT THIGH, INITIAL ENCOUNTER: ICD-10-CM

## 2024-05-21 DIAGNOSIS — L89.890 PRESSURE ULCER OF LEFT LEG, UNSTAGEABLE: ICD-10-CM

## 2024-05-21 DIAGNOSIS — Z87.828 HISTORY OF SPINAL CORD INJURY: ICD-10-CM

## 2024-05-21 DIAGNOSIS — R60.0 BILATERAL LOWER EXTREMITY EDEMA: ICD-10-CM

## 2024-05-21 DIAGNOSIS — L89.890 PRESSURE INJURY OF RIGHT KNEE, UNSTAGEABLE: ICD-10-CM

## 2024-05-21 DIAGNOSIS — L89.890 PRESSURE INJURY OF LEFT FOOT, UNSTAGEABLE: Primary | ICD-10-CM

## 2024-05-21 DIAGNOSIS — F17.210 TOBACCO DEPENDENCE DUE TO CIGARETTES: ICD-10-CM

## 2024-05-21 PROCEDURE — 1159F MED LIST DOCD IN RCRD: CPT | Performed by: EMERGENCY MEDICINE

## 2024-05-21 PROCEDURE — 1160F RVW MEDS BY RX/DR IN RCRD: CPT | Performed by: EMERGENCY MEDICINE

## 2024-05-21 PROCEDURE — G0463 HOSPITAL OUTPT CLINIC VISIT: HCPCS | Performed by: EMERGENCY MEDICINE
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
## 2024-05-29 ENCOUNTER — TELEPHONE (OUTPATIENT)
Dept: UROLOGY | Facility: CLINIC | Age: 54
End: 2024-05-29
Payer: COMMERCIAL

## 2024-06-03 NOTE — TELEPHONE ENCOUNTER
2ND CALL - CALLED PT TO OFFER R/S OF CX'D APPT 5/31 W/ TAL.    SPOKE W/ WIFE LISTED ON [facility] WHO STATED PATIENT HAS WOUND CLINIC AND P/T APPTS AND THEY WILL HAVE TO CALL BACK TO R/S AFTER THEY ARE DONE WITH THOSE APPOINTMENTS.    ANYTHING ELSE TO DO?

## 2024-06-07 ENCOUNTER — OFFICE VISIT (OUTPATIENT)
Dept: WOUND CARE | Facility: HOSPITAL | Age: 54
End: 2024-06-07
Payer: COMMERCIAL

## 2024-06-07 VITALS
SYSTOLIC BLOOD PRESSURE: 112 MMHG | RESPIRATION RATE: 16 BRPM | HEART RATE: 74 BPM | TEMPERATURE: 97.2 F | DIASTOLIC BLOOD PRESSURE: 70 MMHG

## 2024-06-07 DIAGNOSIS — L89.890 PRESSURE ULCER OF LEFT LEG, UNSTAGEABLE: ICD-10-CM

## 2024-06-07 DIAGNOSIS — R09.89 DECREASED PEDAL PULSES: ICD-10-CM

## 2024-06-07 DIAGNOSIS — R60.0 BILATERAL LOWER EXTREMITY EDEMA: ICD-10-CM

## 2024-06-07 DIAGNOSIS — L89.890 PRESSURE INJURY OF LEFT FOOT, UNSTAGEABLE: Primary | ICD-10-CM

## 2024-06-07 DIAGNOSIS — L89.890 PRESSURE INJURY OF RIGHT KNEE, UNSTAGEABLE: ICD-10-CM

## 2024-06-07 PROCEDURE — 1159F MED LIST DOCD IN RCRD: CPT | Performed by: PHYSICIAN ASSISTANT

## 2024-06-07 PROCEDURE — G0463 HOSPITAL OUTPT CLINIC VISIT: HCPCS | Performed by: PHYSICIAN ASSISTANT

## 2024-06-07 PROCEDURE — 1160F RVW MEDS BY RX/DR IN RCRD: CPT | Performed by: PHYSICIAN ASSISTANT

## 2024-06-07 RX ORDER — MUPIROCIN CALCIUM 20 MG/G
1 CREAM TOPICAL DAILY
Qty: 30 G | Refills: 0 | Status: SHIPPED | OUTPATIENT
Start: 2024-06-07 | End: 2024-07-07

## 2024-06-07 RX ORDER — CEPHALEXIN 500 MG/1
500 CAPSULE ORAL 4 TIMES DAILY
Qty: 28 CAPSULE | Refills: 0 | Status: SHIPPED | OUTPATIENT
Start: 2024-06-07 | End: 2024-06-14
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
## 2024-06-27 ENCOUNTER — OFFICE VISIT (OUTPATIENT)
Dept: WOUND CARE | Facility: HOSPITAL | Age: 54
End: 2024-06-27
Payer: COMMERCIAL

## 2024-06-27 VITALS
SYSTOLIC BLOOD PRESSURE: 94 MMHG | TEMPERATURE: 98.1 F | DIASTOLIC BLOOD PRESSURE: 62 MMHG | HEART RATE: 62 BPM | RESPIRATION RATE: 16 BRPM

## 2024-06-27 DIAGNOSIS — S71.101A OPEN WOUND OF RIGHT THIGH, INITIAL ENCOUNTER: ICD-10-CM

## 2024-06-27 DIAGNOSIS — F17.210 TOBACCO DEPENDENCE DUE TO CIGARETTES: ICD-10-CM

## 2024-06-27 DIAGNOSIS — L89.890 PRESSURE INJURY OF LEFT FOOT, UNSTAGEABLE: Primary | ICD-10-CM

## 2024-06-27 DIAGNOSIS — R09.89 DECREASED PEDAL PULSES: ICD-10-CM

## 2024-06-27 DIAGNOSIS — L89.890 PRESSURE ULCER OF LEFT LEG, UNSTAGEABLE: ICD-10-CM

## 2024-06-27 DIAGNOSIS — L89.893 PRESSURE INJURY OF RIGHT KNEE, STAGE 3: ICD-10-CM

## 2024-06-27 DIAGNOSIS — R60.0 BILATERAL LOWER EXTREMITY EDEMA: ICD-10-CM

## 2024-06-27 DIAGNOSIS — Z87.828 HISTORY OF SPINAL CORD INJURY: ICD-10-CM

## 2024-06-27 PROCEDURE — 1160F RVW MEDS BY RX/DR IN RCRD: CPT | Performed by: EMERGENCY MEDICINE

## 2024-06-27 PROCEDURE — G0463 HOSPITAL OUTPT CLINIC VISIT: HCPCS | Performed by: EMERGENCY MEDICINE

## 2024-06-27 PROCEDURE — 1159F MED LIST DOCD IN RCRD: CPT | Performed by: EMERGENCY MEDICINE

## 2024-06-27 RX ORDER — COLLAGENASE SANTYL 250 [ARB'U]/G
1 OINTMENT TOPICAL DAILY
Qty: 30 G | Refills: 1 | Status: SHIPPED | OUTPATIENT
Start: 2024-06-27
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
## 2024-06-28 ENCOUNTER — PATIENT ROUNDING (BHMG ONLY) (OUTPATIENT)
Dept: WOUND CARE | Facility: HOSPITAL | Age: 54
End: 2024-06-28
Payer: COMMERCIAL

## 2024-06-28 NOTE — PROGRESS NOTES
Did we do a good job with your visit?  Yes      Do you have your medications and/or supplies?  Yes, supply order done with [facility]       Do you have a follow-up appointment?  Yes      Do you have any suggestions to improve our service?  None

## 2024-07-30 ENCOUNTER — OFFICE VISIT (OUTPATIENT)
Dept: WOUND CARE | Facility: HOSPITAL | Age: 54
End: 2024-07-30
Payer: COMMERCIAL

## 2024-07-30 VITALS
HEART RATE: 83 BPM | DIASTOLIC BLOOD PRESSURE: 52 MMHG | TEMPERATURE: 99.7 F | SYSTOLIC BLOOD PRESSURE: 96 MMHG | RESPIRATION RATE: 18 BRPM

## 2024-07-30 DIAGNOSIS — L89.893 PRESSURE INJURY OF RIGHT KNEE, STAGE 3: ICD-10-CM

## 2024-07-30 DIAGNOSIS — R60.0 BILATERAL LOWER EXTREMITY EDEMA: ICD-10-CM

## 2024-07-30 DIAGNOSIS — L89.890 PRESSURE INJURY OF LEFT FOOT, UNSTAGEABLE: Primary | ICD-10-CM

## 2024-07-30 DIAGNOSIS — L89.223 PRESSURE INJURY OF LEFT THIGH, STAGE 3: ICD-10-CM

## 2024-07-30 DIAGNOSIS — L89.890 PRESSURE ULCER OF LEFT LEG, UNSTAGEABLE: ICD-10-CM

## 2024-07-30 PROCEDURE — 1160F RVW MEDS BY RX/DR IN RCRD: CPT | Performed by: EMERGENCY MEDICINE

## 2024-07-30 PROCEDURE — G0463 HOSPITAL OUTPT CLINIC VISIT: HCPCS | Performed by: EMERGENCY MEDICINE

## 2024-07-30 PROCEDURE — 1159F MED LIST DOCD IN RCRD: CPT | Performed by: EMERGENCY MEDICINE
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
## 2024-09-11 ENCOUNTER — OFFICE VISIT (OUTPATIENT)
Dept: WOUND CARE | Facility: HOSPITAL | Age: 54
End: 2024-09-11
Payer: COMMERCIAL

## 2024-09-11 VITALS
HEART RATE: 89 BPM | RESPIRATION RATE: 16 BRPM | DIASTOLIC BLOOD PRESSURE: 64 MMHG | TEMPERATURE: 99.4 F | SYSTOLIC BLOOD PRESSURE: 100 MMHG

## 2024-09-11 DIAGNOSIS — L89.890 PRESSURE INJURY OF LEFT FOOT, UNSTAGEABLE: Primary | ICD-10-CM

## 2024-09-11 DIAGNOSIS — L89.223 PRESSURE INJURY OF LEFT THIGH, STAGE 3: ICD-10-CM

## 2024-09-11 DIAGNOSIS — E46 PROTEIN-CALORIE MALNUTRITION, UNSPECIFIED SEVERITY: ICD-10-CM

## 2024-09-11 DIAGNOSIS — L89.890 PRESSURE ULCER OF LEFT LEG, UNSTAGEABLE: ICD-10-CM

## 2024-09-11 DIAGNOSIS — L89.323 PRESSURE INJURY OF LEFT BUTTOCK, STAGE 3: ICD-10-CM

## 2024-09-11 DIAGNOSIS — Z87.828 HISTORY OF SPINAL CORD INJURY: ICD-10-CM

## 2024-09-11 DIAGNOSIS — L89.893 PRESSURE INJURY OF RIGHT KNEE, STAGE 3: ICD-10-CM

## 2024-09-11 DIAGNOSIS — F17.210 TOBACCO DEPENDENCE DUE TO CIGARETTES: ICD-10-CM

## 2024-09-11 PROCEDURE — 1160F RVW MEDS BY RX/DR IN RCRD: CPT | Performed by: EMERGENCY MEDICINE

## 2024-09-11 PROCEDURE — 1159F MED LIST DOCD IN RCRD: CPT | Performed by: EMERGENCY MEDICINE

## 2024-09-11 PROCEDURE — G0463 HOSPITAL OUTPT CLINIC VISIT: HCPCS | Performed by: EMERGENCY MEDICINE

## 2024-09-11 RX ORDER — ARGININE/GLUTAMINE/CALCIUM BMB 7G-7G-1.5G
1 POWDER IN PACKET (EA) ORAL 2 TIMES DAILY
Qty: 60 PACKET | Refills: 5 | Status: SHIPPED | OUTPATIENT
Start: 2024-09-11
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
## 2024-10-10 ENCOUNTER — OFFICE VISIT (OUTPATIENT)
Dept: WOUND CARE | Facility: HOSPITAL | Age: 54
End: 2024-10-10
Payer: COMMERCIAL

## 2024-10-10 VITALS
RESPIRATION RATE: 18 BRPM | TEMPERATURE: 98.8 F | HEART RATE: 89 BPM | DIASTOLIC BLOOD PRESSURE: 68 MMHG | SYSTOLIC BLOOD PRESSURE: 114 MMHG

## 2024-10-10 DIAGNOSIS — L89.893 PRESSURE INJURY OF LEFT FOOT, STAGE 3: ICD-10-CM

## 2024-10-10 DIAGNOSIS — L89.323 PRESSURE INJURY OF LEFT BUTTOCK, STAGE 3: Primary | ICD-10-CM

## 2024-10-10 DIAGNOSIS — Z87.828 HISTORY OF SPINAL CORD INJURY: ICD-10-CM

## 2024-10-10 DIAGNOSIS — L89.313 PRESSURE INJURY OF RIGHT BUTTOCK, STAGE 3: ICD-10-CM

## 2024-10-10 DIAGNOSIS — R60.0 BILATERAL LOWER EXTREMITY EDEMA: ICD-10-CM

## 2024-10-10 DIAGNOSIS — L89.893 PRESSURE INJURY OF LEFT LEG, STAGE 3: ICD-10-CM

## 2024-10-10 DIAGNOSIS — F17.210 TOBACCO DEPENDENCE DUE TO CIGARETTES: ICD-10-CM

## 2024-10-10 DIAGNOSIS — E46 PROTEIN-CALORIE MALNUTRITION, UNSPECIFIED SEVERITY: ICD-10-CM

## 2024-10-10 PROCEDURE — 1159F MED LIST DOCD IN RCRD: CPT | Performed by: EMERGENCY MEDICINE

## 2024-10-10 PROCEDURE — G0463 HOSPITAL OUTPT CLINIC VISIT: HCPCS | Performed by: EMERGENCY MEDICINE

## 2024-10-10 PROCEDURE — 1160F RVW MEDS BY RX/DR IN RCRD: CPT | Performed by: EMERGENCY MEDICINE
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
## 2024-10-15 ENCOUNTER — TELEPHONE (OUTPATIENT)
Dept: WOUND CARE | Facility: HOSPITAL | Age: 54
End: 2024-10-15
Payer: COMMERCIAL

## 2024-10-15 NOTE — TELEPHONE ENCOUNTER
Nicolas almanza called from [facility] wanted give an update on this patient he is out of network with them but they have reached out to [facility] to get him a low air loss mattress company may be reaching out for more information on patient if it doesn't work out with this company they will try another.

## 2024-10-23 ENCOUNTER — TELEPHONE (OUTPATIENT)
Dept: WOUND CARE | Facility: HOSPITAL | Age: 54
End: 2024-10-23
Payer: COMMERCIAL

## 2024-10-23 NOTE — TELEPHONE ENCOUNTER
"Pt's spouse called stating the patient's pressure wound has gotten much worse and it gets worse by the day. She reports the wound having an odor and approximately 1\" deep. She denies the wound being red, streaking or having warmth to touch.     Dr. Alaniz informed. New orders given to stop the Hydrofera Blue ready to that wound and begin NS moistened wet-to-dry dressings and instructed to change dressings daily.     Educated the patient's on the process of applying a wet-to-dry dressing. Pt's Spouse provided a verbal understanding and stated she had the supplies in the home to change the dressing orders.     Pt re-scheduled for next available appointment.     Pt's spouse advised to take pt to ER/UC if concern for infection is noted. Pt's spouse stated she did not feel the wound was infected at this time.  Offloading education re-enforced.    "

## 2024-10-29 ENCOUNTER — APPOINTMENT (OUTPATIENT)
Dept: CT IMAGING | Facility: HOSPITAL | Age: 54
DRG: 570 | End: 2024-10-29
Payer: COMMERCIAL

## 2024-10-29 ENCOUNTER — HOSPITAL ENCOUNTER (INPATIENT)
Facility: HOSPITAL | Age: 54
LOS: 6 days | Discharge: HOME | DRG: 570 | End: 2024-11-04
Attending: EMERGENCY MEDICINE | Admitting: STUDENT IN AN ORGANIZED HEALTH CARE EDUCATION/TRAINING PROGRAM
Payer: COMMERCIAL

## 2024-10-29 ENCOUNTER — OFFICE VISIT (OUTPATIENT)
Dept: WOUND CARE | Facility: HOSPITAL | Age: 54
End: 2024-10-29
Payer: COMMERCIAL

## 2024-10-29 VITALS
DIASTOLIC BLOOD PRESSURE: 68 MMHG | TEMPERATURE: 98.2 F | RESPIRATION RATE: 18 BRPM | SYSTOLIC BLOOD PRESSURE: 132 MMHG | HEART RATE: 93 BPM

## 2024-10-29 DIAGNOSIS — Z87.828 HISTORY OF SPINAL CORD INJURY: ICD-10-CM

## 2024-10-29 DIAGNOSIS — L08.9 WOUND INFECTION: ICD-10-CM

## 2024-10-29 DIAGNOSIS — F17.210 TOBACCO DEPENDENCE DUE TO CIGARETTES: ICD-10-CM

## 2024-10-29 DIAGNOSIS — L02.31 GLUTEAL ABSCESS: ICD-10-CM

## 2024-10-29 DIAGNOSIS — R26.2 DIFFICULTY IN WALKING: ICD-10-CM

## 2024-10-29 DIAGNOSIS — L89.313 PRESSURE INJURY OF RIGHT BUTTOCK, STAGE 3: ICD-10-CM

## 2024-10-29 DIAGNOSIS — L98.429 SKIN ULCER OF SACRUM, UNSPECIFIED ULCER STAGE: Primary | ICD-10-CM

## 2024-10-29 DIAGNOSIS — N40.1 BENIGN PROSTATIC HYPERPLASIA WITH LOWER URINARY TRACT SYMPTOMS, SYMPTOM DETAILS UNSPECIFIED: ICD-10-CM

## 2024-10-29 DIAGNOSIS — E46 PROTEIN-CALORIE MALNUTRITION, UNSPECIFIED SEVERITY: ICD-10-CM

## 2024-10-29 DIAGNOSIS — L89.893 PRESSURE INJURY OF LEFT FOOT, STAGE 3: ICD-10-CM

## 2024-10-29 DIAGNOSIS — T14.8XXA WOUND INFECTION: ICD-10-CM

## 2024-10-29 DIAGNOSIS — L89.323 PRESSURE INJURY OF LEFT BUTTOCK, STAGE 3: Primary | ICD-10-CM

## 2024-10-29 DIAGNOSIS — L89.893 PRESSURE INJURY OF LEFT LEG, STAGE 3: ICD-10-CM

## 2024-10-29 LAB
ALBUMIN SERPL-MCNC: 3.6 G/DL (ref 3.5–5.2)
ALBUMIN/GLOB SERPL: 1 G/DL
ALP SERPL-CCNC: 88 U/L (ref 39–117)
ALT SERPL W P-5'-P-CCNC: 8 U/L (ref 1–41)
ANION GAP SERPL CALCULATED.3IONS-SCNC: 10.7 MMOL/L (ref 5–15)
AST SERPL-CCNC: 13 U/L (ref 1–40)
BASOPHILS # BLD AUTO: 0.05 10*3/MM3 (ref 0–0.2)
BASOPHILS NFR BLD AUTO: 0.6 % (ref 0–1.5)
BILIRUB SERPL-MCNC: 0.2 MG/DL (ref 0–1.2)
BUN SERPL-MCNC: 16 MG/DL (ref 6–20)
BUN/CREAT SERPL: 19 (ref 7–25)
CALCIUM SPEC-SCNC: 8.9 MG/DL (ref 8.6–10.5)
CHLORIDE SERPL-SCNC: 99 MMOL/L (ref 98–107)
CO2 SERPL-SCNC: 27.3 MMOL/L (ref 22–29)
CREAT SERPL-MCNC: 0.84 MG/DL (ref 0.76–1.27)
D-LACTATE SERPL-SCNC: 1.7 MMOL/L (ref 0.5–2)
DEPRECATED RDW RBC AUTO: 41.1 FL (ref 37–54)
EGFRCR SERPLBLD CKD-EPI 2021: 103 ML/MIN/1.73
EOSINOPHIL # BLD AUTO: 0.16 10*3/MM3 (ref 0–0.4)
EOSINOPHIL NFR BLD AUTO: 2 % (ref 0.3–6.2)
ERYTHROCYTE [DISTWIDTH] IN BLOOD BY AUTOMATED COUNT: 13.2 % (ref 12.3–15.4)
FLUAV SUBTYP SPEC NAA+PROBE: NOT DETECTED
FLUBV RNA ISLT QL NAA+PROBE: NOT DETECTED
GLOBULIN UR ELPH-MCNC: 3.5 GM/DL
GLUCOSE SERPL-MCNC: 120 MG/DL (ref 65–99)
HCT VFR BLD AUTO: 37.4 % (ref 37.5–51)
HGB BLD-MCNC: 12.7 G/DL (ref 13–17.7)
HOLD SPECIMEN: NORMAL
HOLD SPECIMEN: NORMAL
IMM GRANULOCYTES # BLD AUTO: 0.01 10*3/MM3 (ref 0–0.05)
IMM GRANULOCYTES NFR BLD AUTO: 0.1 % (ref 0–0.5)
LYMPHOCYTES # BLD AUTO: 1.08 10*3/MM3 (ref 0.7–3.1)
LYMPHOCYTES NFR BLD AUTO: 13.5 % (ref 19.6–45.3)
MCH RBC QN AUTO: 29.1 PG (ref 26.6–33)
MCHC RBC AUTO-ENTMCNC: 34 G/DL (ref 31.5–35.7)
MCV RBC AUTO: 85.8 FL (ref 79–97)
MONOCYTES # BLD AUTO: 0.63 10*3/MM3 (ref 0.1–0.9)
MONOCYTES NFR BLD AUTO: 7.9 % (ref 5–12)
NEUTROPHILS NFR BLD AUTO: 6.08 10*3/MM3 (ref 1.7–7)
NEUTROPHILS NFR BLD AUTO: 75.9 % (ref 42.7–76)
NRBC BLD AUTO-RTO: 0 /100 WBC (ref 0–0.2)
PLATELET # BLD AUTO: 184 10*3/MM3 (ref 140–450)
PMV BLD AUTO: 10.7 FL (ref 6–12)
POTASSIUM SERPL-SCNC: 4.3 MMOL/L (ref 3.5–5.2)
PROT SERPL-MCNC: 7.1 G/DL (ref 6–8.5)
RBC # BLD AUTO: 4.36 10*6/MM3 (ref 4.14–5.8)
RSV RNA NPH QL NAA+NON-PROBE: NOT DETECTED
SARS-COV-2 RNA RESP QL NAA+PROBE: NOT DETECTED
SODIUM SERPL-SCNC: 137 MMOL/L (ref 136–145)
WBC NRBC COR # BLD AUTO: 8.01 10*3/MM3 (ref 3.4–10.8)
WHOLE BLOOD HOLD COAG: NORMAL
WHOLE BLOOD HOLD SPECIMEN: NORMAL

## 2024-10-29 PROCEDURE — 99215 OFFICE O/P EST HI 40 MIN: CPT | Performed by: EMERGENCY MEDICINE

## 2024-10-29 PROCEDURE — 87077 CULTURE AEROBIC IDENTIFY: CPT | Performed by: EMERGENCY MEDICINE

## 2024-10-29 PROCEDURE — 1160F RVW MEDS BY RX/DR IN RCRD: CPT | Performed by: EMERGENCY MEDICINE

## 2024-10-29 PROCEDURE — 25810000003 SODIUM CHLORIDE 0.9 % SOLUTION: Performed by: EMERGENCY MEDICINE

## 2024-10-29 PROCEDURE — 87070 CULTURE OTHR SPECIMN AEROBIC: CPT | Performed by: EMERGENCY MEDICINE

## 2024-10-29 PROCEDURE — 25010000002 VANCOMYCIN 5 G RECONSTITUTED SOLUTION: Performed by: EMERGENCY MEDICINE

## 2024-10-29 PROCEDURE — 1159F MED LIST DOCD IN RCRD: CPT | Performed by: EMERGENCY MEDICINE

## 2024-10-29 PROCEDURE — 85025 COMPLETE CBC W/AUTO DIFF WBC: CPT

## 2024-10-29 PROCEDURE — 80053 COMPREHEN METABOLIC PANEL: CPT

## 2024-10-29 PROCEDURE — 36415 COLL VENOUS BLD VENIPUNCTURE: CPT

## 2024-10-29 PROCEDURE — 83605 ASSAY OF LACTIC ACID: CPT

## 2024-10-29 PROCEDURE — 99291 CRITICAL CARE FIRST HOUR: CPT

## 2024-10-29 PROCEDURE — 87205 SMEAR GRAM STAIN: CPT | Performed by: EMERGENCY MEDICINE

## 2024-10-29 PROCEDURE — 87637 SARSCOV2&INF A&B&RSV AMP PRB: CPT | Performed by: EMERGENCY MEDICINE

## 2024-10-29 PROCEDURE — 87040 BLOOD CULTURE FOR BACTERIA: CPT

## 2024-10-29 PROCEDURE — 74177 CT ABD & PELVIS W/CONTRAST: CPT

## 2024-10-29 PROCEDURE — 25510000001 IOPAMIDOL PER 1 ML: Performed by: EMERGENCY MEDICINE

## 2024-10-29 PROCEDURE — 25010000002 HYDROMORPHONE 1 MG/ML SOLUTION: Performed by: EMERGENCY MEDICINE

## 2024-10-29 PROCEDURE — G0463 HOSPITAL OUTPT CLINIC VISIT: HCPCS | Performed by: EMERGENCY MEDICINE

## 2024-10-29 PROCEDURE — 25010000002 PIPERACILLIN SOD-TAZOBACTAM PER 1 G: Performed by: EMERGENCY MEDICINE

## 2024-10-29 PROCEDURE — 99285 EMERGENCY DEPT VISIT HI MDM: CPT

## 2024-10-29 PROCEDURE — 87186 SC STD MICRODIL/AGAR DIL: CPT | Performed by: EMERGENCY MEDICINE

## 2024-10-29 PROCEDURE — 87040 BLOOD CULTURE FOR BACTERIA: CPT | Performed by: EMERGENCY MEDICINE

## 2024-10-29 RX ORDER — AMOXICILLIN 250 MG
2 CAPSULE ORAL 2 TIMES DAILY PRN
Status: DISCONTINUED | OUTPATIENT
Start: 2024-10-29 | End: 2024-11-04 | Stop reason: HOSPADM

## 2024-10-29 RX ORDER — PROBIOTIC PRODUCT - TAB 1B-250 MG
1 TAB ORAL DAILY
Status: DISCONTINUED | OUTPATIENT
Start: 2024-10-30 | End: 2024-11-04 | Stop reason: HOSPADM

## 2024-10-29 RX ORDER — ACETAMINOPHEN 325 MG/1
650 TABLET ORAL EVERY 4 HOURS PRN
Status: DISCONTINUED | OUTPATIENT
Start: 2024-10-29 | End: 2024-11-04 | Stop reason: HOSPADM

## 2024-10-29 RX ORDER — ACETAMINOPHEN 160 MG/5ML
650 SOLUTION ORAL EVERY 4 HOURS PRN
Status: DISCONTINUED | OUTPATIENT
Start: 2024-10-29 | End: 2024-11-04 | Stop reason: HOSPADM

## 2024-10-29 RX ORDER — IOPAMIDOL 755 MG/ML
100 INJECTION, SOLUTION INTRAVASCULAR
Status: COMPLETED | OUTPATIENT
Start: 2024-10-29 | End: 2024-10-29

## 2024-10-29 RX ORDER — QUETIAPINE FUMARATE 200 MG/1
400 TABLET, FILM COATED ORAL NIGHTLY
Status: DISCONTINUED | OUTPATIENT
Start: 2024-10-30 | End: 2024-11-04 | Stop reason: HOSPADM

## 2024-10-29 RX ORDER — BACLOFEN 10 MG/1
20 TABLET ORAL 2 TIMES DAILY
Status: DISCONTINUED | OUTPATIENT
Start: 2024-10-30 | End: 2024-11-04 | Stop reason: HOSPADM

## 2024-10-29 RX ORDER — IBUPROFEN 800 MG/1
800 TABLET, FILM COATED ORAL EVERY 6 HOURS PRN
COMMUNITY

## 2024-10-29 RX ORDER — NALOXONE HCL 0.4 MG/ML
0.4 VIAL (ML) INJECTION
Status: DISCONTINUED | OUTPATIENT
Start: 2024-10-29 | End: 2024-10-30

## 2024-10-29 RX ORDER — L.ACID,PARA/B.BIFIDUM/S.THERM 8B CELL
1 CAPSULE ORAL DAILY
Status: DISCONTINUED | OUTPATIENT
Start: 2024-10-30 | End: 2024-10-29

## 2024-10-29 RX ORDER — FOLIC ACID 1 MG/1
1 TABLET ORAL DAILY
COMMUNITY

## 2024-10-29 RX ORDER — GABAPENTIN 300 MG/1
300 CAPSULE ORAL 3 TIMES DAILY
COMMUNITY

## 2024-10-29 RX ORDER — ALPRAZOLAM 0.25 MG/1
0.5 TABLET ORAL EVERY 8 HOURS PRN
Status: DISCONTINUED | OUTPATIENT
Start: 2024-10-29 | End: 2024-11-04 | Stop reason: HOSPADM

## 2024-10-29 RX ORDER — ONDANSETRON 2 MG/ML
4 INJECTION INTRAMUSCULAR; INTRAVENOUS EVERY 6 HOURS PRN
Status: DISCONTINUED | OUTPATIENT
Start: 2024-10-29 | End: 2024-11-04 | Stop reason: HOSPADM

## 2024-10-29 RX ORDER — ALUMINA, MAGNESIA, AND SIMETHICONE 2400; 2400; 240 MG/30ML; MG/30ML; MG/30ML
15 SUSPENSION ORAL EVERY 6 HOURS PRN
Status: DISCONTINUED | OUTPATIENT
Start: 2024-10-29 | End: 2024-10-31

## 2024-10-29 RX ORDER — POLYETHYLENE GLYCOL 3350 17 G/17G
17 POWDER, FOR SOLUTION ORAL DAILY PRN
Status: DISCONTINUED | OUTPATIENT
Start: 2024-10-29 | End: 2024-11-04 | Stop reason: HOSPADM

## 2024-10-29 RX ORDER — FUROSEMIDE 20 MG/1
20 TABLET ORAL EVERY 24 HOURS
Status: DISCONTINUED | OUTPATIENT
Start: 2024-10-30 | End: 2024-11-04 | Stop reason: HOSPADM

## 2024-10-29 RX ORDER — BUSPIRONE HYDROCHLORIDE 15 MG/1
15 TABLET ORAL EVERY 12 HOURS SCHEDULED
Status: DISCONTINUED | OUTPATIENT
Start: 2024-10-30 | End: 2024-11-04 | Stop reason: HOSPADM

## 2024-10-29 RX ORDER — ENOXAPARIN SODIUM 100 MG/ML
40 INJECTION SUBCUTANEOUS DAILY
Status: DISCONTINUED | OUTPATIENT
Start: 2024-10-30 | End: 2024-11-04 | Stop reason: HOSPADM

## 2024-10-29 RX ORDER — OXYCODONE AND ACETAMINOPHEN 5; 325 MG/1; MG/1
1 TABLET ORAL EVERY 8 HOURS PRN
Status: DISCONTINUED | OUTPATIENT
Start: 2024-10-29 | End: 2024-10-30 | Stop reason: SDUPTHER

## 2024-10-29 RX ORDER — OLANZAPINE 5 MG/1
5 TABLET ORAL NIGHTLY
COMMUNITY

## 2024-10-29 RX ORDER — PANTOPRAZOLE SODIUM 40 MG/1
40 TABLET, DELAYED RELEASE ORAL
Status: DISCONTINUED | OUTPATIENT
Start: 2024-10-30 | End: 2024-11-04 | Stop reason: HOSPADM

## 2024-10-29 RX ORDER — BISACODYL 5 MG/1
5 TABLET, DELAYED RELEASE ORAL DAILY PRN
Status: DISCONTINUED | OUTPATIENT
Start: 2024-10-29 | End: 2024-11-04 | Stop reason: HOSPADM

## 2024-10-29 RX ORDER — BISACODYL 10 MG
10 SUPPOSITORY, RECTAL RECTAL DAILY PRN
Status: DISCONTINUED | OUTPATIENT
Start: 2024-10-29 | End: 2024-11-04 | Stop reason: HOSPADM

## 2024-10-29 RX ORDER — ACETAMINOPHEN 650 MG/1
650 SUPPOSITORY RECTAL EVERY 4 HOURS PRN
Status: DISCONTINUED | OUTPATIENT
Start: 2024-10-29 | End: 2024-11-04 | Stop reason: HOSPADM

## 2024-10-29 RX ORDER — HYDROXYZINE HYDROCHLORIDE 25 MG/1
50 TABLET, FILM COATED ORAL DAILY PRN
Status: DISCONTINUED | OUTPATIENT
Start: 2024-10-29 | End: 2024-11-04 | Stop reason: HOSPADM

## 2024-10-29 RX ORDER — SODIUM CHLORIDE 0.9 % (FLUSH) 0.9 %
10 SYRINGE (ML) INJECTION AS NEEDED
Status: DISCONTINUED | OUTPATIENT
Start: 2024-10-29 | End: 2024-11-04 | Stop reason: HOSPADM

## 2024-10-29 RX ADMIN — IOPAMIDOL 100 ML: 755 INJECTION, SOLUTION INTRAVENOUS at 20:16

## 2024-10-29 RX ADMIN — HYDROMORPHONE HYDROCHLORIDE 1 MG: 1 INJECTION, SOLUTION INTRAMUSCULAR; INTRAVENOUS; SUBCUTANEOUS at 20:23

## 2024-10-29 RX ADMIN — VANCOMYCIN HYDROCHLORIDE 1250 MG: 5 INJECTION, POWDER, LYOPHILIZED, FOR SOLUTION INTRAVENOUS at 21:32

## 2024-10-29 RX ADMIN — PIPERACILLIN AND TAZOBACTAM 3.38 G: 3; .375 INJECTION, POWDER, FOR SOLUTION INTRAVENOUS at 19:33

## 2024-10-29 NOTE — ED PROVIDER NOTES
Time: 6:02 PM EDT  Date of encounter:  10/29/2024  Independent Historian/Clinical History and Information was obtained by:   Patient    History is limited by: N/A    Chief Complaint   Patient presents with    Wound Check         History of Present Illness:  Patient is a 53 y.o. year old male who presents to the emergency department for evaluation of sacral wound and fever.  Patient states he was at wound care today and they are concerned that there is some tunneling going on.  It appears that there is stool leaking from the wound.  RAMANA Wolff    Patient Care Team  Primary Care Provider: Ronen Ohara MD    Past Medical History:     Allergies   Allergen Reactions    Varenicline Other (See Comments)     dizziness     Past Medical History:   Diagnosis Date    Anxiety     Chronic tetraplegia     Contracture of hand     LEFT HAND TIGHT HAND CONTRACTURE. RIGHT HAD CAN OPEN 30%.    Drug abuse     Hematuria     Neurogenic bladder     Spinal cord injury at C1-C4 level without spinal injury     Urinary retention      Past Surgical History:   Procedure Laterality Date    ANKLE SURGERY      CERVICAL FUSION      C2-C7    FRACTURE SURGERY      ARM    URETEROSCOPY LASER LITHOTRIPSY WITH STENT INSERTION Bilateral 11/21/2023    Procedure: CYSTOSCOPY BILATERAL RETROGRADE PYELOGRAM BLADDER STONE EXTRACTION;  Surgeon: Jennifer Bowman MD;  Location: [facility] MAIN OR;  Service: Urology;  Laterality: Bilateral;     History reviewed. No pertinent family history.    Home Medications:  Prior to Admission medications    Medication Sig Start Date End Date Taking? Authorizing Provider   baclofen (LIORESAL) 20 MG tablet Take 1 tablet by mouth 2 (Two) Times a Day. Takes 2 tablets    José Antonio Esposito MD   BIOTIN PO Take  by mouth.    José Antonio Esposito MD   busPIRone (BUSPAR) 15 MG tablet buspirone 15 mg oral tablet take 1 tablet (15 mg) by oral route 2 times per day   Active    ProviderJosé Antonio MD   collagenase (Santyl)  "250 UNIT/GM ointment Apply 1 Application topically to the appropriate area as directed Daily. 6/27/24   Doreen Alaniz MD   cyclobenzaprine (FLEXERIL) 10 MG tablet Take 1 tablet by mouth 3 (Three) Times a Day.  Patient not taking: Reported on 1/17/2024 12/28/23   Jose Roy MD   furosemide (LASIX) 20 MG tablet Take 1 tablet by mouth Daily.    José nAtonio Esposito MD   hydrOXYzine (ATARAX) 50 MG tablet Take 1 tablet by mouth Daily As Needed for Itching.    José Antonio Esposito MD   Lactobacillus (Florajen Acidophilus) capsule Take 1 capsule by mouth Daily. 1/26/24   Rosey Fallon APRN   mupirocin (BACTROBAN) 2 % ointment Apply 1 application  topically to the appropriate area as directed 2 (Two) Times a Day. 1/17/24   William Mills DPM   Nutritional Supplements (Tristian) pack Take 1 packet by mouth 2 (Two) Times a Day. 9/11/24   Doreen Alaniz MD   omeprazole (priLOSEC) 40 MG capsule Take 1 capsule by mouth Daily. 12/29/21   José Antonio Esposito MD   QUEtiapine Fumarate (SEROQUEL PO) Take 400 mg by mouth Every Night.    José Antonio Esposito MD   tamsulosin (FLOMAX) 0.4 MG capsule 24 hr capsule Take 2 capsules by mouth Daily for 360 days. 11/8/23 11/2/24  Rosey Fallon APRN   traZODone (DESYREL) 100 MG tablet 1 tablet every night at bedtime.    José Antonio Esposito MD        Social History:   Social History     Tobacco Use    Smoking status: Every Day     Current packs/day: 1.00     Types: Cigarettes     Passive exposure: Current    Smokeless tobacco: Never   Vaping Use    Vaping status: Never Used   Substance Use Topics    Alcohol use: Not Currently    Drug use: Yes     Comment: fentanyl powder /percocets \"off the street\" for managing pain         Review of Systems:  Review of Systems   Constitutional:  Positive for fever. Negative for chills.   HENT:  Negative for congestion, rhinorrhea and sore throat.    Eyes:  Negative for pain and visual disturbance.   Respiratory:  " "Negative for apnea, cough, chest tightness and shortness of breath.    Cardiovascular:  Negative for chest pain and palpitations.   Gastrointestinal:  Negative for abdominal pain, diarrhea, nausea and vomiting.   Genitourinary:  Negative for difficulty urinating and dysuria.   Musculoskeletal:  Negative for joint swelling and myalgias.   Skin:  Positive for wound. Negative for color change.   Neurological:  Negative for seizures and headaches.   Psychiatric/Behavioral: Negative.     All other systems reviewed and are negative.       Physical Exam:  /65 (BP Location: Left arm, Patient Position: Sitting)   Pulse 89   Temp (!) 101.3 °F (38.5 °C) (Oral)   Resp 18   Ht 172.7 cm (68\")   Wt 63.9 kg (140 lb 14 oz)   SpO2 90%   BMI 21.42 kg/m²         Physical Exam  Vitals and nursing note reviewed.   Constitutional:       General: He is not in acute distress.     Appearance: Normal appearance. He is ill-appearing. He is not toxic-appearing.   HENT:      Head: Normocephalic and atraumatic.      Jaw: There is normal jaw occlusion.   Eyes:      General: Lids are normal.      Extraocular Movements: Extraocular movements intact.      Conjunctiva/sclera: Conjunctivae normal.      Pupils: Pupils are equal, round, and reactive to light.   Cardiovascular:      Rate and Rhythm: Normal rate and regular rhythm.      Pulses: Normal pulses.      Heart sounds: Normal heart sounds.   Pulmonary:      Effort: Pulmonary effort is normal. No respiratory distress.      Breath sounds: Normal breath sounds. No wheezing or rhonchi.   Abdominal:      General: Abdomen is flat. There is no distension.      Palpations: Abdomen is soft.      Tenderness: There is no abdominal tenderness. There is no guarding or rebound.   Musculoskeletal:         General: Normal range of motion.      Cervical back: Normal range of motion and neck supple.      Right lower leg: No edema.      Left lower leg: No edema.   Skin:     General: Skin is warm.      " Coloration: Skin is not cyanotic.      Comments: Buttock pressure ulcers   Neurological:      Mental Status: He is alert and oriented to person, place, and time. Mental status is at baseline.   Psychiatric:         Attention and Perception: Attention and perception normal.         Mood and Affect: Mood normal.                      Procedures:  Procedures      Medical Decision Making:      Comorbidities that affect care:    Spinal cord injury, history of pressure ulcers    External Notes reviewed:    Previous Clinic Note: Wound clinic office visit for pressure injury management      The following orders were placed and all results were independently analyzed by me:  Orders Placed This Encounter   Procedures    Blood Culture - Blood,    Blood Culture - Blood,    COVID PRE-OP / PRE-PROCEDURE SCREENING ORDER (NO ISOLATION) - Swab, Nasopharynx    COVID-19, FLU A/B, RSV PCR 1 HR TAT - Swab, Nasopharynx    CT Abdomen Pelvis With Contrast    Comprehensive Metabolic Panel    Lactic Acid, Plasma    [place] Draw    CBC Auto Differential    Urinalysis With Culture If Indicated - Urine, Catheter    Undress & Gown    Continuous Pulse Oximetry    Vital Signs    Surgery (on-call MD unless specified)    IP General Consult (Use specialty-specific consult if known)    Oxygen Therapy- Nasal Cannula; Titrate 1-6 LPM Per SpO2; 90 - 95%    Insert Peripheral IV    Inpatient Admission    CBC & Differential    Green Top (Gel)    Lavender Top    Gold Top - SST    Light Blue Top       Medications Given in the Emergency Department:  Medications   sodium chloride 0.9 % flush 10 mL (has no administration in time range)   vancomycin 1250 mg/250 mL 0.9% NS IVPB ([facility]) (1,250 mg Intravenous New Bag 10/29/24 2132)   piperacillin-tazobactam (ZOSYN) IVPB 3.375 g IVPB in 100 mL NS (VTB) (0 g Intravenous Stopped 10/29/24 2023)   HYDROmorphone (DILAUDID) injection 1 mg (1 mg Intravenous Given 10/29/24 2023)   iopamidol (ISOVUE-370) 76 % injection 100 mL  (100 mL Intravenous Given 10/29/24 2016)        ED Course:    The patient was initially evaluated in the triage area where orders were placed. The patient was later dispositioned by Yash Curiel MD.      The patient was advised to stay for completion of workup which includes but is not limited to communication of labs and radiological results, reassessment and plan. The patient was advised that leaving prior to disposition by a provider could result in critical findings that are not communicated to the patient.          Labs:    Lab Results (last 24 hours)       Procedure Component Value Units Date/Time    Wound Culture - Wound, Buttock, Left [[id number]] Collected: 10/29/24 1528    Specimen: Wound from Buttock, Left Updated: 10/29/24 1921     Gram Stain Few (2+) Gram negative bacilli      Rare (1+) Gram positive cocci in pairs and chains      Moderate (3+) WBCs seen    CBC & Differential [[id number]]  (Abnormal) Collected: 10/29/24 1805    Specimen: Blood from Arm, Right Updated: 10/29/24 1815    Narrative:      The following orders were created for panel order CBC & Differential.  Procedure                               Abnormality         Status                     ---------                               -----------         ------                     CBC Auto Differential[[id number]]        Abnormal            Final result                 Please view results for these tests on the individual orders.    Comprehensive Metabolic Panel [[id number]]  (Abnormal) Collected: 10/29/24 1805    Specimen: Blood from Arm, Right Updated: 10/29/24 1837     Glucose 120 mg/dL      BUN 16 mg/dL      Creatinine 0.84 mg/dL      Sodium 137 mmol/L      Potassium 4.3 mmol/L      Chloride 99 mmol/L      CO2 27.3 mmol/L      Calcium 8.9 mg/dL      Total Protein 7.1 g/dL      Albumin 3.6 g/dL      ALT (SGPT) 8 U/L      AST (SGOT) 13 U/L      Alkaline Phosphatase 88 U/L      Total Bilirubin 0.2 mg/dL      Globulin 3.5 gm/dL      A/G Ratio  1.0 g/dL      BUN/Creatinine Ratio 19.0     Anion Gap 10.7 mmol/L      eGFR 103.0 mL/min/1.73     Narrative:      GFR Normal >60  Chronic Kidney Disease <60  Kidney Failure <15      Lactic Acid, Plasma [[id number]]  (Normal) Collected: 10/29/24 1805    Specimen: Blood from Arm, Right Updated: 10/29/24 1831     Lactate 1.7 mmol/L     Blood Culture - Blood, Arm, Right [[id number]] Collected: 10/29/24 1805    Specimen: Blood from Arm, Right Updated: 10/29/24 1812    CBC Auto Differential [[id number]]  (Abnormal) Collected: 10/29/24 1805    Specimen: Blood from Arm, Right Updated: 10/29/24 1815     WBC 8.01 10*3/mm3      RBC 4.36 10*6/mm3      Hemoglobin 12.7 g/dL      Hematocrit 37.4 %      MCV 85.8 fL      MCH 29.1 pg      MCHC 34.0 g/dL      RDW 13.2 %      RDW-SD 41.1 fl      MPV 10.7 fL      Platelets 184 10*3/mm3      Neutrophil % 75.9 %      Lymphocyte % 13.5 %      Monocyte % 7.9 %      Eosinophil % 2.0 %      Basophil % 0.6 %      Immature Grans % 0.1 %      Neutrophils, Absolute 6.08 10*3/mm3      Lymphocytes, Absolute 1.08 10*3/mm3      Monocytes, Absolute 0.63 10*3/mm3      Eosinophils, Absolute 0.16 10*3/mm3      Basophils, Absolute 0.05 10*3/mm3      Immature Grans, Absolute 0.01 10*3/mm3      nRBC 0.0 /100 WBC     COVID PRE-OP / PRE-PROCEDURE SCREENING ORDER (NO ISOLATION) - Swab, Nasopharynx [[id number]]  (Normal) Collected: 10/29/24 1806    Specimen: Swab from Nasopharynx Updated: 10/29/24 1851    Narrative:      The following orders were created for panel order COVID PRE-OP / PRE-PROCEDURE SCREENING ORDER (NO ISOLATION) - Swab, Nasopharynx.  Procedure                               Abnormality         Status                     ---------                               -----------         ------                     COVID-19, FLU A/B, RSV P...[[id number]]  Normal              Final result                 Please view results for these tests on the individual orders.    COVID-19, FLU A/B, RSV PCR 1 HR TAT - Swab,  Nasopharynx [[id number]]  (Normal) Collected: 10/29/24 1806    Specimen: Swab from Nasopharynx Updated: 10/29/24 1851     COVID19 Not Detected     Influenza A PCR Not Detected     Influenza B PCR Not Detected     RSV, PCR Not Detected    Narrative:      Fact sheet for providers: https://www.fda.gov/media/652866/download    Fact sheet for patients: https://www.fda.gov/media/080637/download    Test performed by PCR.    Blood Culture - Blood, Arm, Left [[id number]] Collected: 10/29/24 1850    Specimen: Blood from Arm, Left Updated: 10/29/24 1858             Imaging:    CT Abdomen Pelvis With Contrast    Result Date: 10/29/2024  CT ABDOMEN PELVIS W CONTRAST Date of Exam: 10/29/2024 8:06 PM EDT Indication: wound/sepsis eval Abdominal pain. Comparison: 10/19/2023 Technique: Axial CT images were obtained of the abdomen and pelvis after the uneventful intravenous administration of iodinated contrast. Reconstructed coronal and sagittal images were also obtained. Automated exposure control and iterative construction methods were used. FINDINGS: Lung bases: No masses. No consolidation. Liver:No masses. No intrahepatic biliary ductal dilatation. Spleen:No masses. No perisplenic hematoma. Pancreas:No pancreatic masses. No evidence of pancreatitis. Gallbladder and common bile duct:No evidence of cholelithiasis. No evidence of cholecystitis. Adrenal glands:No adrenal masses Kidneys and ureters: 2.1 cm left renal cyst. No calculi present within the ureters. Normal caliber ureters. Urinary bladder: Lobulated appearance of the urinary bladder wall. Small bowel:Normal caliber small bowel. Large bowel: Constipation in the appropriate clinical setting. Appendix: Not seen however there is no evidence of appendicitis. GENITOURINARY: Normal prostate Ascites or pneumoperitoneum:None. Adenopathy:None present Osseous structures: Left gluteal decubitus ulcer. This appears to be partially filled with packing material. There is a 3.1 cm x 1.3 cm  peripherally enhancing collection within the deeper soft tissues. Degenerative changes of the hips. Osteopenia. Degenerative changes of the lumbar spine. Mild loss of height at the superior endplate of L1. Mild anterior wedging of T11 and T10. Other findings: None     1. Sacral gluteal ulcer. 3.1 cm x 1.3 cm peripherally enhancing collection concerning for an abscess within the deeper soft tissues. 2. Constipation in the appropriate clinical setting. Electronically Signed: Uriel Olivera MD  10/29/2024 9:01 PM EDT  Workstation ID: [id number]       Differential Diagnosis and Discussion:      Metabolic: Differential diagnosis includes but is not limited to hypertension, hyperglycemia, hyperkalemia, hypocalcemia, metabolic acidosis, hypokalemia, hypoglycemia, malnutrition, hypothyroidism, hyperthyroidism, and adrenal insufficiency.   Wound Evaluation: Differential diagnosis includes but is not limited to laceration, abrasion, puncture, burn, ulcer, cellulitis, abscess, vasculitis, malignancy, and rash.    All labs were reviewed and interpreted by me.  CT scan radiology impression was interpreted by me.    MDM  Number of Diagnoses or Management Options  Gluteal abscess  Skin ulcer of sacrum, unspecified ulcer stage  Diagnosis management comments: In summary this is a 53-year-old male patient who presents to the emerged department from wound care for evaluation of sacral wound.  CBC independently reviewed and interpreted by me and shows no critical abnormalities.  CMP independently reviewed and interpreted by me and shows no critical abnormalities.  Lactic acid independent viewed interpreted by me is normal.  Patient was febrile upon arrival 101.3 Fahrenheit.  CT scan abdomen pelvis reveals sacral ulcer with deep soft tissue abscess.  I discussed this finding with general surgery on-call who recommends n.p.o. after midnight, IV antibiotics which were administered, Banken Zosyn and surgery tomorrow.  Patient case has been  discussed with the PCP team who will admit to the hospital for further evaluation and continuation of treatment.                 Sepsis criteria was met in the emergency department and the Sepsis protocol (including antibiotic administration) was initiated.      SIRS criteria considered:   1.  Temperature > 100.4 or <96.8    2.  Heart Rate > 90    3.  Respiratory Rate > 22    4.  WBC > 12K or <4K.             Severe Sepsis:     Respiratory: Mechanical Ventilation or Bipap  Hypotension: SBP > 90 or MAP < 65  Renal: Creatinine > 2  Metabolic: Lactic Acid > 2  Hematologic: Platelets < 100K or INR > 1.5  Hepatic: BILI  >  2  CNS: Sudden AMS     Septic Shock:     Severe Sepsis + Persistent hypotension or Lactic Acid > 4     Normal saline bolus, Antibiotics, and final disposition was based on these definitions.        Sepsis was recognized at 1926    Antibiotics were ordered.     30 cc/kg bolus was not indicated.       Patient did not receive the recommended 30ml/kg fluid bolus for sepsis because it would be harmful or detrimental to the patient.    The patient has Blood Pressure Responded to Fluid Given.   The patient was ordered 1 L of fluids.    The patient presents with 2 out of the 4 SIRS criteria and a suspected source for sepsis.  Patient was evaluated and placed on a cardiac monitor for fear of worsening tachycardia and life-threatening hypotension.  Patient was monitored for shock and signs of end-organ damage.  Mental status was repeatedly checked throughout the ED stay.  Medications were ordered by me which includes IV Zosyn.  The case was discussed at length with admitting physician.    Total Critical Care time of 40 minutes. Total critical care time documented does not include time spent on separately billed procedures for services of nurses or physician assistants. I personally saw and examined the patient. I have reviewed all diagnostic interpretations and treatment plans as written. I was present for the  key portions of any procedures performed and the inclusive time noted in any critical care statement. Critical care time includes patient management by me, time spent at the patients bedside,  time to review lab and imaging results, discussing patient care, documentation in the medical record, and time spent with family or caregiver.    Patient Care Considerations:    SEPSIS FLUIDS: 30 cc/kg bolus was not indicated in this patient due to no hypotension or elevated lactic acid      Consultants/Shared Management Plan:    Consultant: I have discussed the case with Dr. Quesada who agrees to consult on the patient.  PCP: I have discussed the case with Dr. Castro who agrees to accept the patient for admission.    Social Determinants of Health:    Patient has presented with family members who are responsible, reliable and will ensure follow up care.      Disposition and Care Coordination:    Admit:   Through independent evaluation of the patient's history, physical, and imperical data, the patient meets criteria for inpatient admission to the hospital.        Final diagnoses:   Skin ulcer of sacrum, unspecified ulcer stage   Gluteal abscess        ED Disposition       ED Disposition   Decision to Admit    Condition   --    Comment   Level of Care: Med/Surg [1]   Diagnosis: Sacral ulcer [[id number]]   Admitting Physician: SILVER CASTRO [[id number]]   Attending Physician: SILVER CASTRO [[id number]]   Certification: I Certify That Inpatient Hospital Services Are Medically Necessary For Greater Than 2 Midnights                 This medical record created using voice recognition software.             Yash Curiel MD  10/29/24 [id number]
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
## 2024-10-30 ENCOUNTER — ANESTHESIA (OUTPATIENT)
Dept: PERIOP | Facility: HOSPITAL | Age: 54
End: 2024-10-30
Payer: COMMERCIAL

## 2024-10-30 ENCOUNTER — ANESTHESIA EVENT (OUTPATIENT)
Dept: PERIOP | Facility: HOSPITAL | Age: 54
End: 2024-10-30
Payer: COMMERCIAL

## 2024-10-30 PROBLEM — L98.429 SACRAL ULCER: Status: RESOLVED | Noted: 2024-10-29 | Resolved: 2024-10-30

## 2024-10-30 PROBLEM — L02.31 GLUTEAL ABSCESS: Status: ACTIVE | Noted: 2024-10-29

## 2024-10-30 LAB
ALBUMIN SERPL-MCNC: 3.2 G/DL (ref 3.5–5.2)
ANION GAP SERPL CALCULATED.3IONS-SCNC: 9.3 MMOL/L (ref 5–15)
BASOPHILS # BLD AUTO: 0.05 10*3/MM3 (ref 0–0.2)
BASOPHILS NFR BLD AUTO: 0.8 % (ref 0–1.5)
BILIRUB UR QL STRIP: NEGATIVE
BUN SERPL-MCNC: 14 MG/DL (ref 6–20)
BUN/CREAT SERPL: 19.4 (ref 7–25)
CALCIUM SPEC-SCNC: 8.8 MG/DL (ref 8.6–10.5)
CHLORIDE SERPL-SCNC: 102 MMOL/L (ref 98–107)
CLARITY UR: CLEAR
CO2 SERPL-SCNC: 26.7 MMOL/L (ref 22–29)
COLOR UR: YELLOW
CREAT SERPL-MCNC: 0.72 MG/DL (ref 0.76–1.27)
DEPRECATED RDW RBC AUTO: 42.2 FL (ref 37–54)
EGFRCR SERPLBLD CKD-EPI 2021: 107.9 ML/MIN/1.73
EOSINOPHIL # BLD AUTO: 0.21 10*3/MM3 (ref 0–0.4)
EOSINOPHIL NFR BLD AUTO: 3.4 % (ref 0.3–6.2)
ERYTHROCYTE [DISTWIDTH] IN BLOOD BY AUTOMATED COUNT: 13.5 % (ref 12.3–15.4)
GLUCOSE SERPL-MCNC: 100 MG/DL (ref 65–99)
GLUCOSE UR STRIP-MCNC: NEGATIVE MG/DL
HCT VFR BLD AUTO: 33.5 % (ref 37.5–51)
HGB BLD-MCNC: 11.1 G/DL (ref 13–17.7)
HGB UR QL STRIP.AUTO: NEGATIVE
IMM GRANULOCYTES # BLD AUTO: 0.01 10*3/MM3 (ref 0–0.05)
IMM GRANULOCYTES NFR BLD AUTO: 0.2 % (ref 0–0.5)
KETONES UR QL STRIP: NEGATIVE
LEUKOCYTE ESTERASE UR QL STRIP.AUTO: NEGATIVE
LYMPHOCYTES # BLD AUTO: 1.96 10*3/MM3 (ref 0.7–3.1)
LYMPHOCYTES NFR BLD AUTO: 32.1 % (ref 19.6–45.3)
MCH RBC QN AUTO: 28.8 PG (ref 26.6–33)
MCHC RBC AUTO-ENTMCNC: 33.1 G/DL (ref 31.5–35.7)
MCV RBC AUTO: 87 FL (ref 79–97)
MONOCYTES # BLD AUTO: 0.63 10*3/MM3 (ref 0.1–0.9)
MONOCYTES NFR BLD AUTO: 10.3 % (ref 5–12)
NEUTROPHILS NFR BLD AUTO: 3.24 10*3/MM3 (ref 1.7–7)
NEUTROPHILS NFR BLD AUTO: 53.2 % (ref 42.7–76)
NITRITE UR QL STRIP: NEGATIVE
NRBC BLD AUTO-RTO: 0 /100 WBC (ref 0–0.2)
PH UR STRIP.AUTO: 7.5 [PH] (ref 5–8)
PHOSPHATE SERPL-MCNC: 2.9 MG/DL (ref 2.5–4.5)
PLATELET # BLD AUTO: 153 10*3/MM3 (ref 140–450)
PMV BLD AUTO: 11 FL (ref 6–12)
POTASSIUM SERPL-SCNC: 4 MMOL/L (ref 3.5–5.2)
PROT UR QL STRIP: NEGATIVE
RBC # BLD AUTO: 3.85 10*6/MM3 (ref 4.14–5.8)
SODIUM SERPL-SCNC: 138 MMOL/L (ref 136–145)
SP GR UR STRIP: 1.03 (ref 1–1.03)
UROBILINOGEN UR QL STRIP: NORMAL
WBC NRBC COR # BLD AUTO: 6.1 10*3/MM3 (ref 3.4–10.8)

## 2024-10-30 PROCEDURE — 25010000002 DEXAMETHASONE PER 1 MG

## 2024-10-30 PROCEDURE — 25010000002 SUGAMMADEX 200 MG/2ML SOLUTION

## 2024-10-30 PROCEDURE — 99254 IP/OBS CNSLTJ NEW/EST MOD 60: CPT | Performed by: SURGERY

## 2024-10-30 PROCEDURE — 25010000002 PROPOFOL 10 MG/ML EMULSION

## 2024-10-30 PROCEDURE — 25010000002 HYDROMORPHONE 1 MG/ML SOLUTION

## 2024-10-30 PROCEDURE — 81003 URINALYSIS AUTO W/O SCOPE: CPT | Performed by: STUDENT IN AN ORGANIZED HEALTH CARE EDUCATION/TRAINING PROGRAM

## 2024-10-30 PROCEDURE — 25010000002 VANCOMYCIN 1 G RECONSTITUTED SOLUTION 1 EACH VIAL: Performed by: STUDENT IN AN ORGANIZED HEALTH CARE EDUCATION/TRAINING PROGRAM

## 2024-10-30 PROCEDURE — 94799 UNLISTED PULMONARY SVC/PX: CPT

## 2024-10-30 PROCEDURE — 25010000002 VANCOMYCIN 1 G RECONSTITUTED SOLUTION 1 EACH VIAL: Performed by: SURGERY

## 2024-10-30 PROCEDURE — 25010000002 MIDAZOLAM PER 1MG: Performed by: ANESTHESIOLOGY

## 2024-10-30 PROCEDURE — 25010000002 PIPERACILLIN SOD-TAZOBACTAM PER 1 G: Performed by: STUDENT IN AN ORGANIZED HEALTH CARE EDUCATION/TRAINING PROGRAM

## 2024-10-30 PROCEDURE — 25010000002 ONDANSETRON PER 1 MG

## 2024-10-30 PROCEDURE — 87075 CULTR BACTERIA EXCEPT BLOOD: CPT | Performed by: SURGERY

## 2024-10-30 PROCEDURE — 85025 COMPLETE CBC W/AUTO DIFF WBC: CPT | Performed by: STUDENT IN AN ORGANIZED HEALTH CARE EDUCATION/TRAINING PROGRAM

## 2024-10-30 PROCEDURE — 25010000002 FENTANYL CITRATE (PF) 50 MCG/ML SOLUTION

## 2024-10-30 PROCEDURE — 25010000002 BUPIVACAINE (PF) 0.25 % SOLUTION: Performed by: SURGERY

## 2024-10-30 PROCEDURE — 11004 DBRDMT SKIN XTRNL GENT&PER: CPT | Performed by: SURGERY

## 2024-10-30 PROCEDURE — 87015 SPECIMEN INFECT AGNT CONCNTJ: CPT | Performed by: SURGERY

## 2024-10-30 PROCEDURE — 80069 RENAL FUNCTION PANEL: CPT | Performed by: STUDENT IN AN ORGANIZED HEALTH CARE EDUCATION/TRAINING PROGRAM

## 2024-10-30 PROCEDURE — 25010000002 DEXAMETHASONE SODIUM PHOSPHATE 100 MG/10ML SOLUTION: Performed by: SURGERY

## 2024-10-30 PROCEDURE — 25010000002 HYDROMORPHONE 1 MG/ML SOLUTION: Performed by: STUDENT IN AN ORGANIZED HEALTH CARE EDUCATION/TRAINING PROGRAM

## 2024-10-30 PROCEDURE — 25010000002 LIDOCAINE PF 2% 2 % SOLUTION

## 2024-10-30 PROCEDURE — 87205 SMEAR GRAM STAIN: CPT | Performed by: SURGERY

## 2024-10-30 PROCEDURE — 25810000003 LACTATED RINGERS PER 1000 ML: Performed by: ANESTHESIOLOGY

## 2024-10-30 PROCEDURE — 0JB90ZZ EXCISION OF BUTTOCK SUBCUTANEOUS TISSUE AND FASCIA, OPEN APPROACH: ICD-10-PCS | Performed by: SURGERY

## 2024-10-30 PROCEDURE — 94761 N-INVAS EAR/PLS OXIMETRY MLT: CPT

## 2024-10-30 PROCEDURE — 25810000003 SODIUM CHLORIDE 0.9 % SOLUTION 250 ML FLEX CONT: Performed by: STUDENT IN AN ORGANIZED HEALTH CARE EDUCATION/TRAINING PROGRAM

## 2024-10-30 PROCEDURE — 88304 TISSUE EXAM BY PATHOLOGIST: CPT | Performed by: SURGERY

## 2024-10-30 PROCEDURE — 25810000003 LACTATED RINGERS PER 1000 ML: Performed by: STUDENT IN AN ORGANIZED HEALTH CARE EDUCATION/TRAINING PROGRAM

## 2024-10-30 PROCEDURE — 25010000002 HYDROMORPHONE 1 MG/ML SOLUTION: Performed by: SURGERY

## 2024-10-30 PROCEDURE — 25010000002 HYDROMORPHONE 1 MG/ML SOLUTION: Performed by: ORTHOPAEDIC SURGERY

## 2024-10-30 PROCEDURE — 25010000002 PIPERACILLIN SOD-TAZOBACTAM PER 1 G: Performed by: SURGERY

## 2024-10-30 PROCEDURE — 25010000002 BUPRENORPHINE PER 0.1 MG: Performed by: SURGERY

## 2024-10-30 PROCEDURE — 25810000003 SODIUM CHLORIDE 0.9 % SOLUTION 250 ML FLEX CONT: Performed by: SURGERY

## 2024-10-30 PROCEDURE — 87070 CULTURE OTHR SPECIMN AEROBIC: CPT | Performed by: SURGERY

## 2024-10-30 RX ORDER — PROMETHAZINE HYDROCHLORIDE 12.5 MG/1
25 TABLET ORAL ONCE AS NEEDED
Status: DISCONTINUED | OUTPATIENT
Start: 2024-10-30 | End: 2024-10-30 | Stop reason: HOSPADM

## 2024-10-30 RX ORDER — FENTANYL CITRATE 50 UG/ML
INJECTION, SOLUTION INTRAMUSCULAR; INTRAVENOUS AS NEEDED
Status: DISCONTINUED | OUTPATIENT
Start: 2024-10-30 | End: 2024-10-30 | Stop reason: SURG

## 2024-10-30 RX ORDER — DEXAMETHASONE SODIUM PHOSPHATE 4 MG/ML
INJECTION, SOLUTION INTRA-ARTICULAR; INTRALESIONAL; INTRAMUSCULAR; INTRAVENOUS; SOFT TISSUE AS NEEDED
Status: DISCONTINUED | OUTPATIENT
Start: 2024-10-30 | End: 2024-10-30 | Stop reason: SURG

## 2024-10-30 RX ORDER — OXYCODONE HYDROCHLORIDE 5 MG/1
5 TABLET ORAL
Status: COMPLETED | OUTPATIENT
Start: 2024-10-30 | End: 2024-10-30

## 2024-10-30 RX ORDER — ROCURONIUM BROMIDE 10 MG/ML
INJECTION, SOLUTION INTRAVENOUS AS NEEDED
Status: DISCONTINUED | OUTPATIENT
Start: 2024-10-30 | End: 2024-10-30 | Stop reason: SURG

## 2024-10-30 RX ORDER — OXYCODONE AND ACETAMINOPHEN 5; 325 MG/1; MG/1
1 TABLET ORAL EVERY 4 HOURS PRN
Status: DISCONTINUED | OUTPATIENT
Start: 2024-10-30 | End: 2024-11-03

## 2024-10-30 RX ORDER — MEPERIDINE HYDROCHLORIDE 25 MG/ML
12.5 INJECTION INTRAMUSCULAR; INTRAVENOUS; SUBCUTANEOUS
Status: DISCONTINUED | OUTPATIENT
Start: 2024-10-30 | End: 2024-10-30 | Stop reason: HOSPADM

## 2024-10-30 RX ORDER — MIDAZOLAM HYDROCHLORIDE 2 MG/2ML
2 INJECTION, SOLUTION INTRAMUSCULAR; INTRAVENOUS ONCE
Status: COMPLETED | OUTPATIENT
Start: 2024-10-30 | End: 2024-10-30

## 2024-10-30 RX ORDER — ONDANSETRON 2 MG/ML
INJECTION INTRAMUSCULAR; INTRAVENOUS AS NEEDED
Status: DISCONTINUED | OUTPATIENT
Start: 2024-10-30 | End: 2024-10-30 | Stop reason: SURG

## 2024-10-30 RX ORDER — PROPOFOL 10 MG/ML
VIAL (ML) INTRAVENOUS AS NEEDED
Status: DISCONTINUED | OUTPATIENT
Start: 2024-10-30 | End: 2024-10-30 | Stop reason: SURG

## 2024-10-30 RX ORDER — PROMETHAZINE HYDROCHLORIDE 25 MG/1
25 SUPPOSITORY RECTAL ONCE AS NEEDED
Status: DISCONTINUED | OUTPATIENT
Start: 2024-10-30 | End: 2024-10-30 | Stop reason: HOSPADM

## 2024-10-30 RX ORDER — NALOXONE HCL 0.4 MG/ML
0.4 VIAL (ML) INJECTION
Status: DISCONTINUED | OUTPATIENT
Start: 2024-10-30 | End: 2024-11-01

## 2024-10-30 RX ORDER — ACETAMINOPHEN 500 MG
1000 TABLET ORAL ONCE
Status: COMPLETED | OUTPATIENT
Start: 2024-10-30 | End: 2024-10-30

## 2024-10-30 RX ORDER — LIDOCAINE HYDROCHLORIDE 20 MG/ML
INJECTION, SOLUTION EPIDURAL; INFILTRATION; INTRACAUDAL; PERINEURAL AS NEEDED
Status: DISCONTINUED | OUTPATIENT
Start: 2024-10-30 | End: 2024-10-30 | Stop reason: SURG

## 2024-10-30 RX ORDER — SODIUM CHLORIDE, SODIUM LACTATE, POTASSIUM CHLORIDE, CALCIUM CHLORIDE 600; 310; 30; 20 MG/100ML; MG/100ML; MG/100ML; MG/100ML
75 INJECTION, SOLUTION INTRAVENOUS CONTINUOUS
Status: DISCONTINUED | OUTPATIENT
Start: 2024-10-30 | End: 2024-10-31

## 2024-10-30 RX ORDER — NICOTINE 21 MG/24HR
1 PATCH, TRANSDERMAL 24 HOURS TRANSDERMAL
Status: DISCONTINUED | OUTPATIENT
Start: 2024-10-30 | End: 2024-11-04 | Stop reason: HOSPADM

## 2024-10-30 RX ORDER — SODIUM CHLORIDE, SODIUM LACTATE, POTASSIUM CHLORIDE, CALCIUM CHLORIDE 600; 310; 30; 20 MG/100ML; MG/100ML; MG/100ML; MG/100ML
9 INJECTION, SOLUTION INTRAVENOUS ONCE
Status: COMPLETED | OUTPATIENT
Start: 2024-10-30 | End: 2024-10-30

## 2024-10-30 RX ORDER — NALOXONE HCL 0.4 MG/ML
0.4 VIAL (ML) INJECTION
Status: DISCONTINUED | OUTPATIENT
Start: 2024-10-30 | End: 2024-10-30

## 2024-10-30 RX ORDER — OLANZAPINE 5 MG/1
5 TABLET ORAL NIGHTLY
Status: DISCONTINUED | OUTPATIENT
Start: 2024-10-30 | End: 2024-11-04 | Stop reason: HOSPADM

## 2024-10-30 RX ADMIN — ALPRAZOLAM 0.5 MG: 0.25 TABLET ORAL at 12:54

## 2024-10-30 RX ADMIN — HYDROMORPHONE HYDROCHLORIDE 0.5 MG: 1 INJECTION, SOLUTION INTRAMUSCULAR; INTRAVENOUS; SUBCUTANEOUS at 00:02

## 2024-10-30 RX ADMIN — SODIUM CHLORIDE, POTASSIUM CHLORIDE, SODIUM LACTATE AND CALCIUM CHLORIDE 75 ML/HR: 600; 310; 30; 20 INJECTION, SOLUTION INTRAVENOUS at 09:07

## 2024-10-30 RX ADMIN — HYDROMORPHONE HYDROCHLORIDE 0.5 MG: 1 INJECTION, SOLUTION INTRAMUSCULAR; INTRAVENOUS; SUBCUTANEOUS at 20:01

## 2024-10-30 RX ADMIN — LIDOCAINE HYDROCHLORIDE 60 MG: 20 INJECTION, SOLUTION INTRAVENOUS at 10:53

## 2024-10-30 RX ADMIN — BUSPIRONE HYDROCHLORIDE 15 MG: 15 TABLET ORAL at 20:01

## 2024-10-30 RX ADMIN — HYDROMORPHONE HYDROCHLORIDE 1 MG: 1 INJECTION, SOLUTION INTRAMUSCULAR; INTRAVENOUS; SUBCUTANEOUS at 23:36

## 2024-10-30 RX ADMIN — OLANZAPINE 5 MG: 5 TABLET, FILM COATED ORAL at 20:01

## 2024-10-30 RX ADMIN — ROCURONIUM BROMIDE 50 MG: 10 INJECTION, SOLUTION INTRAVENOUS at 10:53

## 2024-10-30 RX ADMIN — PIPERACILLIN AND TAZOBACTAM 3.38 G: 3; .375 INJECTION, POWDER, FOR SOLUTION INTRAVENOUS at 03:58

## 2024-10-30 RX ADMIN — HYDROMORPHONE HYDROCHLORIDE 0.5 MG: 1 INJECTION, SOLUTION INTRAMUSCULAR; INTRAVENOUS; SUBCUTANEOUS at 18:02

## 2024-10-30 RX ADMIN — BACLOFEN 20 MG: 10 TABLET ORAL at 20:01

## 2024-10-30 RX ADMIN — QUETIAPINE FUMARATE 400 MG: 200 TABLET ORAL at 20:03

## 2024-10-30 RX ADMIN — BUSPIRONE HYDROCHLORIDE 15 MG: 15 TABLET ORAL at 09:33

## 2024-10-30 RX ADMIN — PIPERACILLIN AND TAZOBACTAM 3.38 G: 3; .375 INJECTION, POWDER, FOR SOLUTION INTRAVENOUS at 17:01

## 2024-10-30 RX ADMIN — SODIUM CHLORIDE 1000 MG: 9 INJECTION, SOLUTION INTRAVENOUS at 21:01

## 2024-10-30 RX ADMIN — SODIUM CHLORIDE, POTASSIUM CHLORIDE, SODIUM LACTATE AND CALCIUM CHLORIDE: 600; 310; 30; 20 INJECTION, SOLUTION INTRAVENOUS at 10:43

## 2024-10-30 RX ADMIN — OXYCODONE HYDROCHLORIDE AND ACETAMINOPHEN 1 TABLET: 5; 325 TABLET ORAL at 09:04

## 2024-10-30 RX ADMIN — ONDANSETRON HYDROCHLORIDE 4 MG: 2 SOLUTION INTRAMUSCULAR; INTRAVENOUS at 11:24

## 2024-10-30 RX ADMIN — SUGAMMADEX 200 MG: 100 INJECTION, SOLUTION INTRAVENOUS at 11:41

## 2024-10-30 RX ADMIN — HYDROMORPHONE HYDROCHLORIDE 0.5 MG: 1 INJECTION, SOLUTION INTRAMUSCULAR; INTRAVENOUS; SUBCUTANEOUS at 11:54

## 2024-10-30 RX ADMIN — OXYCODONE HYDROCHLORIDE 5 MG: 5 TABLET ORAL at 12:06

## 2024-10-30 RX ADMIN — HYDROXYZINE HYDROCHLORIDE 50 MG: 25 TABLET, FILM COATED ORAL at 18:36

## 2024-10-30 RX ADMIN — HYDROMORPHONE HYDROCHLORIDE 0.5 MG: 1 INJECTION, SOLUTION INTRAMUSCULAR; INTRAVENOUS; SUBCUTANEOUS at 22:42

## 2024-10-30 RX ADMIN — HYDROMORPHONE HYDROCHLORIDE 0.5 MG: 1 INJECTION, SOLUTION INTRAMUSCULAR; INTRAVENOUS; SUBCUTANEOUS at 16:00

## 2024-10-30 RX ADMIN — NICOTINE 1 PATCH: 21 PATCH, EXTENDED RELEASE TRANSDERMAL at 18:10

## 2024-10-30 RX ADMIN — ALPRAZOLAM 0.5 MG: 0.25 TABLET ORAL at 21:01

## 2024-10-30 RX ADMIN — SODIUM CHLORIDE 1000 MG: 9 INJECTION, SOLUTION INTRAVENOUS at 09:04

## 2024-10-30 RX ADMIN — ACETAMINOPHEN 1000 MG: 500 TABLET ORAL at 10:27

## 2024-10-30 RX ADMIN — HYDROMORPHONE HYDROCHLORIDE 0.5 MG: 1 INJECTION, SOLUTION INTRAMUSCULAR; INTRAVENOUS; SUBCUTANEOUS at 14:07

## 2024-10-30 RX ADMIN — ALPRAZOLAM 0.5 MG: 0.25 TABLET ORAL at 00:02

## 2024-10-30 RX ADMIN — DEXAMETHASONE SODIUM PHOSPHATE 4 MG: 4 INJECTION, SOLUTION INTRAMUSCULAR; INTRAVENOUS at 11:09

## 2024-10-30 RX ADMIN — PROPOFOL 100 MG: 10 INJECTION, EMULSION INTRAVENOUS at 10:53

## 2024-10-30 RX ADMIN — HYDROMORPHONE HYDROCHLORIDE 0.5 MG: 1 INJECTION, SOLUTION INTRAMUSCULAR; INTRAVENOUS; SUBCUTANEOUS at 04:30

## 2024-10-30 RX ADMIN — FENTANYL CITRATE 100 MCG: 50 INJECTION, SOLUTION INTRAMUSCULAR; INTRAVENOUS at 10:51

## 2024-10-30 RX ADMIN — PROBIOTIC PRODUCT - TAB 1 TABLET: TAB at 09:04

## 2024-10-30 RX ADMIN — BACLOFEN 20 MG: 10 TABLET ORAL at 09:33

## 2024-10-30 RX ADMIN — HYDROMORPHONE HYDROCHLORIDE 0.25 MG: 1 INJECTION, SOLUTION INTRAMUSCULAR; INTRAVENOUS; SUBCUTANEOUS at 12:22

## 2024-10-30 RX ADMIN — HYDROMORPHONE HYDROCHLORIDE 0.5 MG: 1 INJECTION, SOLUTION INTRAMUSCULAR; INTRAVENOUS; SUBCUTANEOUS at 09:33

## 2024-10-30 RX ADMIN — HYDROMORPHONE HYDROCHLORIDE 0.5 MG: 1 INJECTION, SOLUTION INTRAMUSCULAR; INTRAVENOUS; SUBCUTANEOUS at 11:24

## 2024-10-30 RX ADMIN — OXYCODONE HYDROCHLORIDE 5 MG: 5 TABLET ORAL at 12:22

## 2024-10-30 RX ADMIN — HYDROMORPHONE HYDROCHLORIDE 0.5 MG: 1 INJECTION, SOLUTION INTRAMUSCULAR; INTRAVENOUS; SUBCUTANEOUS at 12:03

## 2024-10-30 RX ADMIN — PIPERACILLIN AND TAZOBACTAM 3.38 G: 3; .375 INJECTION, POWDER, FOR SOLUTION INTRAVENOUS at 11:05

## 2024-10-30 RX ADMIN — MIDAZOLAM HYDROCHLORIDE 2 MG: 1 INJECTION, SOLUTION INTRAMUSCULAR; INTRAVENOUS at 10:29

## 2024-10-30 NOTE — CONSULTS
History and Physical    Patient Name:  Haider Jacobs  YOB: 1970  [id number]    Referring Provider:  Dr. Castro      Patient Care Team:  Ronen Ohara MD as PCP - General (Internal Medicine)  Rosey Fallon APRN as Nurse Practitioner (Urology)    Reason for consult/Chief complaint: left gluteal wound    Subjective .     History of present illness:  Haider Jacobs is a 53 y.o. gentleman with a past medical history of anxiety, quadriplegia now able to ambulate with a walker, left hand contracture, prior drug abuse, hematuria, 1 PPD cigarette smoker, neurogenic bladder,spinal cord injury at C1-C4 who was stent to the ED at [facility] from the wound care clinic for evaluation of left gluteal wound.   Patient reports the wound started approximately one month ago.  He typically sits up in a wheelchair all day or sits in a recliner.        History:  Past Medical History:   Diagnosis Date    Anxiety     Chronic tetraplegia     Contracture of hand     LEFT HAND TIGHT HAND CONTRACTURE. RIGHT HAD CAN OPEN 30%.    Drug abuse     Hematuria     Neurogenic bladder     Spinal cord injury at C1-C4 level without spinal injury     Urinary retention        Past Surgical History:   Procedure Laterality Date    ANKLE SURGERY      CERVICAL FUSION      C2-C7    FRACTURE SURGERY      ARM    URETEROSCOPY LASER LITHOTRIPSY WITH STENT INSERTION Bilateral 11/21/2023    Procedure: CYSTOSCOPY BILATERAL RETROGRADE PYELOGRAM BLADDER STONE EXTRACTION;  Surgeon: Jennifer Bowman MD;  Location: [facility] MAIN OR;  Service: Urology;  Laterality: Bilateral;       History reviewed. No pertinent family history.    Social History     Tobacco Use    Smoking status: Every Day     Current packs/day: 1.00     Types: Cigarettes     Passive exposure: Current    Smokeless tobacco: Never   Vaping Use    Vaping status: Never Used   Substance Use Topics    Alcohol use: Not Currently    Drug use: Yes     Comment: fentanyl powder /percocets  "\"off the street\" for managing pain       Review of Systems:  A complete ROS was performed and all are negative except for what is documented in the HPI.    MEDS:  Prior to Admission medications    Medication Sig Start Date End Date Taking? Authorizing Provider   baclofen (LIORESAL) 20 MG tablet Take 1 tablet by mouth 3 (Three) Times a Day.    José Antonio Esposito MD   busPIRone (BUSPAR) 30 MG tablet Take 1 tablet by mouth 2 (Two) Times a Day.    José Antonio Esposito MD   folic acid (FOLVITE) 1 MG tablet Take 1 tablet by mouth Daily.    José Antonio Esposito MD   gabapentin (NEURONTIN) 300 MG capsule Take 1 capsule by mouth 3 (Three) Times a Day.    José Antonio Esposito MD   hydrOXYzine (ATARAX) 50 MG tablet Take 1 tablet by mouth 2 (Two) Times a Day As Needed for Itching.    José Antonio Esposito MD   ibuprofen (ADVIL,MOTRIN) 800 MG tablet Take 1 tablet by mouth Every 6 (Six) Hours As Needed for Mild Pain.    José Antonio Esposito MD   Lactobacillus (Florajen Acidophilus) capsule Take 1 capsule by mouth Daily. 1/26/24   Rosey Fallon APRN   Nutritional Supplements (Tristian) pack Take 1 packet by mouth 2 (Two) Times a Day. 9/11/24   Doreen Alaniz MD   OLANZapine (zyPREXA) 5 MG tablet Take 1 tablet by mouth Every Night.    José Antonio Esposito MD   omeprazole (priLOSEC) 40 MG capsule Take 1 capsule by mouth Daily. 12/29/21   José Antonio Esposito MD   QUEtiapine (SEROquel) 400 MG tablet Take 1 tablet by mouth Every Night.    José Antonio Esposito MD   tamsulosin (FLOMAX) 0.4 MG capsule 24 hr capsule Take 2 capsules by mouth Daily for 360 days. 11/8/23 11/2/24  Rosey Fallon APRN   traZODone (DESYREL) 150 MG tablet Take 1 tablet by mouth Every Night.    José Antonio Esposito MD        baclofen, 20 mg, Oral, BID  busPIRone, 15 mg, Oral, Q12H  enoxaparin, 40 mg, Subcutaneous, Daily  furosemide, 20 mg, Oral, Q24H  pantoprazole, 40 mg, Oral, Q AM  piperacillin-tazobactam, 3.375 g, Intravenous, " "Q8H  QUEtiapine, 400 mg, Oral, Nightly  Mimi-Bid Probiotic, 1 tablet, Oral, Daily  vancomycin, 1,000 mg, Intravenous, Q12H         lactated ringers, 75 mL/hr  Pharmacy to dose vancomycin,          Allergies:  Varenicline    Objective         Physical Exam:      Constitutional:  well nourished, no acute distress, appears stated age BP 91/58 (BP Location: Left arm, Patient Position: Lying)   Pulse 75   Temp 98.8 °F (37.1 °C) (Oral)   Resp 18   Ht 172.7 cm (67.99\")   Wt 63.5 kg (140 lb)   SpO2 93%   BMI 21.29 kg/m²    Eyes:  anicteric sclerae, moist conjunctivae, no lid lag, PERRLA  ENMT:  oropharynx clear, moist mucous membranes, good dentition  Neck:   full ROM, trachea midline, no thyromegaly  Cardiovascular: RRR, S1 and S2 present, no MRG, heart rate 75, no pedal edema  Respiratory: lungs CTA, respirations even and unlabored  GI:  Abdomen soft, nontender, nondistended, no HSM     Skin:  warm and dry, normal turgor, no rashes. Left gluteal wound with greenish necrotic eschar on one side, tender to palpation, foul smelling thick yellow tan drainage   Psychiatric:  alert and oriented x3, intact judgment and insight, cooperative         Results Review: I have reviewed the patient's labs and imaging including CBC, BMP    LABS/IMAGING:    WBC   Date Value Ref Range Status   10/30/2024 6.10 3.40 - 10.80 10*3/mm3 Final     RBC   Date Value Ref Range Status   10/30/2024 3.85 (L) 4.14 - 5.80 10*6/mm3 Final     Hemoglobin   Date Value Ref Range Status   10/30/2024 11.1 (L) 13.0 - 17.7 g/dL Final     Hematocrit   Date Value Ref Range Status   10/30/2024 33.5 (L) 37.5 - 51.0 % Final     MCV   Date Value Ref Range Status   10/30/2024 87.0 79.0 - 97.0 fL Final     MCH   Date Value Ref Range Status   10/30/2024 28.8 26.6 - 33.0 pg Final     MCHC   Date Value Ref Range Status   10/30/2024 33.1 31.5 - 35.7 g/dL Final     RDW   Date Value Ref Range Status   10/30/2024 13.5 12.3 - 15.4 % Final     RDW-SD   Date Value Ref Range " "Status   10/30/2024 42.2 37.0 - 54.0 fl Final     MPV   Date Value Ref Range Status   10/30/2024 11.0 6.0 - 12.0 fL Final     Platelets   Date Value Ref Range Status   10/30/2024 153 140 - 450 10*3/mm3 Final     Neutrophil %   Date Value Ref Range Status   10/30/2024 53.2 42.7 - 76.0 % Final     Lymphocyte %   Date Value Ref Range Status   10/30/2024 32.1 19.6 - 45.3 % Final     Monocyte %   Date Value Ref Range Status   10/30/2024 10.3 5.0 - 12.0 % Final     Eosinophil %   Date Value Ref Range Status   10/30/2024 3.4 0.3 - 6.2 % Final     Basophil %   Date Value Ref Range Status   10/30/2024 0.8 0.0 - 1.5 % Final     Immature Grans %   Date Value Ref Range Status   10/30/2024 0.2 0.0 - 0.5 % Final     Neutrophils, Absolute   Date Value Ref Range Status   10/30/2024 3.24 1.70 - 7.00 10*3/mm3 Final     Lymphocytes, Absolute   Date Value Ref Range Status   10/30/2024 1.96 0.70 - 3.10 10*3/mm3 Final     Monocytes, Absolute   Date Value Ref Range Status   10/30/2024 0.63 0.10 - 0.90 10*3/mm3 Final     Eosinophils, Absolute   Date Value Ref Range Status   10/30/2024 0.21 0.00 - 0.40 10*3/mm3 Final     Basophils, Absolute   Date Value Ref Range Status   10/30/2024 0.05 0.00 - 0.20 10*3/mm3 Final     Immature Grans, Absolute   Date Value Ref Range Status   10/30/2024 0.01 0.00 - 0.05 10*3/mm3 Final     nRBC   Date Value Ref Range Status   10/30/2024 0.0 0.0 - 0.2 /100 WBC Final        Basic Metabolic Panel    Sodium Sodium   Date Value Ref Range Status   10/30/2024 138 136 - 145 mmol/L Final   10/29/2024 137 136 - 145 mmol/L Final      Potassium Potassium   Date Value Ref Range Status   10/30/2024 4.0 3.5 - 5.2 mmol/L Final   10/29/2024 4.3 3.5 - 5.2 mmol/L Final      Chloride Chloride   Date Value Ref Range Status   10/30/2024 102 98 - 107 mmol/L Final   10/29/2024 99 98 - 107 mmol/L Final      Bicarbonate No results found for: \"PLASMABICARB\"   BUN BUN   Date Value Ref Range Status   10/30/2024 14 6 - 20 mg/dL Final " "  10/29/2024 16 6 - 20 mg/dL Final      Creatinine Creatinine   Date Value Ref Range Status   10/30/2024 0.72 (L) 0.76 - 1.27 mg/dL Final   10/29/2024 0.84 0.76 - 1.27 mg/dL Final      Calcium Calcium   Date Value Ref Range Status   10/30/2024 8.8 8.6 - 10.5 mg/dL Final   10/29/2024 8.9 8.6 - 10.5 mg/dL Final      Glucose      No components found for: \"GLUCOSE.*\"        Lab Results   Component Value Date    GLUCOSE 100 (H) 10/30/2024    BUN 14 10/30/2024    CREATININE 0.72 (L) 10/30/2024    EGFRIFNONA 96 02/07/2022    BCR 19.4 10/30/2024    K 4.0 10/30/2024    CO2 26.7 10/30/2024    CALCIUM 8.8 10/30/2024    ALBUMIN 3.2 (L) 10/30/2024    LABIL2 1.5 05/27/2020    AST 13 10/29/2024    ALT 8 10/29/2024          CT Abdomen Pelvis With Contrast    Result Date: 10/29/2024  CT ABDOMEN PELVIS W CONTRAST Date of Exam: 10/29/2024 8:06 PM EDT Indication: wound/sepsis eval Abdominal pain. Comparison: 10/19/2023 Technique: Axial CT images were obtained of the abdomen and pelvis after the uneventful intravenous administration of iodinated contrast. Reconstructed coronal and sagittal images were also obtained. Automated exposure control and iterative construction methods were used. FINDINGS: Lung bases: No masses. No consolidation. Liver:No masses. No intrahepatic biliary ductal dilatation. Spleen:No masses. No perisplenic hematoma. Pancreas:No pancreatic masses. No evidence of pancreatitis. Gallbladder and common bile duct:No evidence of cholelithiasis. No evidence of cholecystitis. Adrenal glands:No adrenal masses Kidneys and ureters: 2.1 cm left renal cyst. No calculi present within the ureters. Normal caliber ureters. Urinary bladder: Lobulated appearance of the urinary bladder wall. Small bowel:Normal caliber small bowel. Large bowel: Constipation in the appropriate clinical setting. Appendix: Not seen however there is no evidence of appendicitis. GENITOURINARY: Normal prostate Ascites or pneumoperitoneum:None. " Adenopathy:None present Osseous structures: Left gluteal decubitus ulcer. This appears to be partially filled with packing material. There is a 3.1 cm x 1.3 cm peripherally enhancing collection within the deeper soft tissues. Degenerative changes of the hips. Osteopenia. Degenerative changes of the lumbar spine. Mild loss of height at the superior endplate of L1. Mild anterior wedging of T11 and T10. Other findings: None     Impression: 1. Sacral gluteal ulcer. 3.1 cm x 1.3 cm peripherally enhancing collection concerning for an abscess within the deeper soft tissues. 2. Constipation in the appropriate clinical setting. Electronically Signed: Uriel Olivera MD  10/29/2024 9:01 PM EDT  Workstation ID: [id number]          Assessment & Plan     Left gluteal abscess   Npo   Case request and consent for DEBRIDEMENT OF Left ISCHIAL ULCER/BUTTOCKS WOUND: clean and remove unhealthy tissue from the wound using surgical instruments by Dr. Quesada risks, benefits, alternatives discussed               Electronically signed by RAMANA Lecuhga, 10/30/24, 8:53 AM EDT.

## 2024-10-30 NOTE — OP NOTE
OP NOTE  DEBRIDEMENT OF ISCHIAL ULCER/BUTTOCKS WOUND  Procedure Report    Patient Name:  Haider Jacobs  YOB: 1970  [id number]    Date of Surgery:  10/30/2024     Indications: See last clinic note for indications, discussion of risk benefits and alternatives    Pre-op Diagnosis:   Gluteal abscess [L02.31]       Post-Op Diagnosis Codes:     * Gluteal abscess [L02.31]    Procedure/CPT® Codes:      Procedure(s): Incision and drainage abscess with debridement of soft tissue left buttock wound    Staff:  Surgeon(s):  Saeid Quesada MD    Assistant: Laura Ware RN CSA    Anesthesia: General, Local    Estimated Blood Loss:  10 cc    Implants:    Nothing was implanted during the procedure    Specimen:          Specimens       ID Source Type Tests Collected By Collected At Frozen?    1 Buttock, Left Wound WOUND CULTURE   Saeid Quesada MD 10/30/24 1123     Description: Soft tissue culture    This specimen was not marked as sent.    A Buttock, Left Tissue TISSUE PATHOLOGY EXAM   Saeid Quesada MD 10/30/24 1122     Description: soft tissue    This specimen was not marked as sent.              Findings: Minimal amount of purulence drained from left buttock wound.  Culture taken.  Sharp excisional debridement of soft tissue left buttock wound    Complications: None    Description of Procedure:   After all questions were answered, consent was verified.  Patient brought to the operating room per stretcher placed in supine position arms out all extremities padded.  Bilateral lower extremity SCDs placed.  Anesthesia induced.  Patient on scheduled antibiotics.  Patient rotated to the right with all extremities padded.  Patient's left buttock prepped with Betadine.  Draped in usual sterile fashion.  Critical timeout taken.  Began the procedure excising necrotic tissue sharply with scissors.  I entered an abscess cavity with culture taken.  It was sent to pathology.  I then further  debrided the left buttock wound sharply with scissors until I encountered bleeding tissue.  The wound measured approximately 3 x 3 x 5 cm.  I then obtained hemostasis.  I irrigated with sterile water.  I placed Santyl throughout the wound.  I packed with moistened Kerlix.  I infiltrated with local anesthesia.  Dressing applied.  At the end of the procedure all counts were correct.  I was present for the procedure.    Assistant: Laura Ware RN CSA was responsible for performing the following activities: Retraction, Suction, and Placing Dressing and their skilled assistance was necessary for the success of this case.    Saeid Quesada MD     Date: 10/30/2024  Time: 11:34 EDT

## 2024-10-30 NOTE — PLAN OF CARE
Goal Outcome Evaluation:  Plan of Care Reviewed With: patient        Progress: no change  Outcome Evaluation: AOx4, NPO this morning for debridement of wound on left ischial aspect. Procedure complete this afternoon. Tolerated well. Wound is lined with santyl, packed with kerlix, and covered with mepilex. Patient bedrest as of now but has been able to tolerate weight on BLE to stand and pivot, per patient recall. C/O pain in the bilateral gluteal area, see mar. Regular diet resumed. See Dr. Quesada's note for more information. Continue plan of care.
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
## 2024-10-30 NOTE — CONSULTS
"Nutrition Services    Patient Name: Haider Jacobs  YOB: 1970  MRN: [id number]  Admission date: 10/29/2024      CLINICAL NUTRITION ASSESSMENT      Reason for Assessment   Pressure Injury     H&P:  Past Medical History:   Diagnosis Date    Anxiety     Chronic tetraplegia     Contracture of hand     LEFT HAND TIGHT HAND CONTRACTURE. RIGHT HAD CAN OPEN 30%.    Drug abuse     Hematuria     Neurogenic bladder     Spinal cord injury at C1-C4 level without spinal injury     Urinary retention         Current Problems:   Active Hospital Problems    Diagnosis     Gluteal abscess         Nutrition/Diet History         Narrative   Pt OOR in OR for debridement of left sacral wound at time of visit. Screened at risk for multiple St 3 PI's, necrotic tissue noted by [facility]. Tristian already ordered BID, [facility] ordering specialty bed. Spoke with [facility], specialty bed ordered does not cause increased fluid consumption.     Anthropometrics        Current Height, Weight Height: 172.7 cm (67.99\")  Weight: 63.5 kg (140 lb)   Current BMI Body mass index is 21.29 kg/m².   BMI Classification Normal range    % IBW used 61.56kg with -10% adj for SCI: 103%   Adjusted Body Weight (ABW)    Weight Hx  Wt Readings from Last 30 Encounters:   10/30/24 0500 63.5 kg (140 lb)   10/29/24 1828 63.9 kg (140 lb 14 oz)   01/26/24 1254 56.7 kg (125 lb)   01/17/24 0817 56.7 kg (125 lb)   12/22/23 1254 60.1 kg (132 lb 7.9 oz)   11/21/23 0742 60.1 kg (132 lb 7.9 oz)   11/17/23 1013 63.5 kg (140 lb)   11/08/23 1102 60.3 kg (132 lb 15 oz)   10/19/23 1400 60.3 kg (133 lb)   08/08/23 1046 60.3 kg (133 lb)   07/18/23 0809 60.3 kg (133 lb)   07/11/23 0949 60.3 kg (133 lb)   06/17/23 1055 60.7 kg (133 lb 13.1 oz)   12/28/22 0933 63.4 kg (139 lb 12.4 oz)   03/04/22 1000 77.1 kg (170 lb)   02/17/22 1043 77.1 kg (170 lb)   02/04/22 1601 84.4 kg (186 lb 1.1 oz)   08/26/21 1846 85.8 kg (189 lb 2.5 oz)   06/04/20 0000 95.3 kg (210 lb)          Wt Change Observation " +15# past 9 mo     Estimated/Assessed Needs  Estimated Needs based on: Current Body Weight - Noted C-level SCI with tetraplegia, did not subtract kcals d/t need for increased energy to assist in wound healing.       Energy Requirements 25-30 kcal/kg    EST Needs (kcal/day) 6208-5214       Protein Requirements 1.2-1.5 g.kg   EST Daily Needs (g/day) 77-96       Fluid Requirements SCI    Estimated Needs (mL/day) 2-3L/day     Labs/Medications         Pertinent Labs Reviewed.   Results from last 7 days   Lab Units 10/30/24  0539 10/29/24  1805   SODIUM mmol/L 138 137   POTASSIUM mmol/L 4.0 4.3   CHLORIDE mmol/L 102 99   CO2 mmol/L 26.7 27.3   BUN mg/dL 14 16   CREATININE mg/dL 0.72* 0.84   CALCIUM mg/dL 8.8 8.9   BILIRUBIN mg/dL  --  0.2   ALK PHOS U/L  --  88   ALT (SGPT) U/L  --  8   AST (SGOT) U/L  --  13   GLUCOSE mg/dL 100* 120*     Results from last 7 days   Lab Units 10/30/24  0539   PHOSPHORUS mg/dL 2.9   HEMOGLOBIN g/dL 11.1*   HEMATOCRIT % 33.5*     COVID19   Date Value Ref Range Status   10/29/2024 Not Detected Not Detected - Ref. Range Final     Lab Results   Component Value Date    HGBA1C 4.6 12/26/2019         Pertinent Medications Reviewed.     Malnutrition Severity Assessment              Nutrition Diagnosis         Nutrition Dx Problem 1 Increased nutrient needs related to increased protein demand as evidenced by impaired skin integrity.     Nutrition Intervention           Current Nutrition Orders & Evaluation of Intake       Current PO Diet NPO Diet NPO Type: Strict NPO   Supplement Orders Placed This Encounter      Dietary Nutrition Supplements Tristian; fruit punch           Nutrition Intervention/Prescription        Modify diet to High Protein diet to assist with wound healing  Continue Tristian BID  Recommend drawing new A1c        Medical Nutrition Therapy/Nutrition Education          Learner     Readiness Patient  N/A     Method     Response N/A  N/A     Monitor/Evaluation        Monitor PO intake,  Supplement intake, Skin status     Nutrition Discharge Plan         Recommend to continue oral nutrition supplements on discharge.      Electronically signed by:  Farrah Bhakta RD  10/30/24 12:47 EDT

## 2024-10-30 NOTE — PAYOR COMM NOTE
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 caliber ureters. Urinary bladder: Lobulated appearance of the urinary bladder wall. Small bowel:Normal caliber small bowel. Large bowel: Constipation in the appropriate clinical setting. Appendix: Not seen however there is no evidence of appendicitis. GENITOURINARY: Normal prostate Ascites or pneumoperitoneum:None. Adenopathy:None present Osseous structures: Left gluteal  decubitus ulcer. This appears to be partially filled with packing material. There is a 3.1 cm x 1.3 cm peripherally enhancing collection within the deeper soft tissues. Degenerative changes of the hips. Osteopenia. Degenerative changes of the lumbar spine. Mild loss of height at the superior endplate of L1. Mild anterior wedging of T11 and T10. Other findings: None     1. Sacral gluteal ulcer. 3.1 cm x 1.3 cm peripherally enhancing collection concerning for an abscess within the deeper soft tissues. 2. Constipation in the appropriate clinical setting. Electronically Signed: Ureil Olivera MD  10/29/2024 9:01 PM EDT  Workstation ID: [id number]       Differential Diagnosis and Discussion:      Metabolic: Differential diagnosis includes but is not limited to hypertension, hyperglycemia, hyperkalemia, hypocalcemia, metabolic acidosis, hypokalemia, hypoglycemia, malnutrition, hypothyroidism, hyperthyroidism, and adrenal insufficiency.   Wound Evaluation: Differential diagnosis includes but is not limited to laceration, abrasion, puncture, burn, ulcer, cellulitis, abscess, vasculitis, malignancy, and rash.    All labs were reviewed and interpreted by me.  CT scan radiology impression was interpreted by me.    MDM  Number of Diagnoses or Management Options  Gluteal abscess  Skin ulcer of sacrum, unspecified ulcer stage  Diagnosis management comments: In summary this is a 53-year-old male patient who presents to the emerged department from wound care for evaluation of sacral wound.  CBC independently reviewed and interpreted by me and shows no critical abnormalities.  CMP independently reviewed and interpreted by me and shows no critical abnormalities.  Lactic acid independent viewed interpreted by me is normal.  Patient was febrile upon arrival 101.3 Fahrenheit.  CT scan abdomen pelvis reveals sacral ulcer with deep soft tissue abscess.  I discussed this finding with general surgery on-call who recommends n.p.o. after  midnight, IV antibiotics which were administered, Banken Zosyn and surgery tomorrow.  Patient case has been discussed with the PCP team who will admit to the hospital for further evaluation and continuation of treatment.                 Sepsis criteria was met in the emergency department and the Sepsis protocol (including antibiotic administration) was initiated.      SIRS criteria considered:   1.  Temperature > 100.4 or <96.8    2.  Heart Rate > 90    3.  Respiratory Rate > 22    4.  WBC > 12K or <4K.             Severe Sepsis:     Respiratory: Mechanical Ventilation or Bipap  Hypotension: SBP > 90 or MAP < 65  Renal: Creatinine > 2  Metabolic: Lactic Acid > 2  Hematologic: Platelets < 100K or INR > 1.5  Hepatic: BILI  >  2  CNS: Sudden AMS     Septic Shock:     Severe Sepsis + Persistent hypotension or Lactic Acid > 4     Normal saline bolus, Antibiotics, and final disposition was based on these definitions.        Sepsis was recognized at 1926    Antibiotics were ordered.     30 cc/kg bolus was not indicated.       Patient did not receive the recommended 30ml/kg fluid bolus for sepsis because it would be harmful or detrimental to the patient.    The patient has Blood Pressure Responded to Fluid Given.   The patient was ordered 1 L of fluids.    The patient presents with 2 out of the 4 SIRS criteria and a suspected source for sepsis.  Patient was evaluated and placed on a cardiac monitor for fear of worsening tachycardia and life-threatening hypotension.  Patient was monitored for shock and signs of end-organ damage.  Mental status was repeatedly checked throughout the ED stay.  Medications were ordered by me which includes IV Zosyn.  The case was discussed at length with admitting physician.    Total Critical Care time of 40 minutes. Total critical care time documented does not include time spent on separately billed procedures for services of nurses or physician assistants. I personally saw and examined the  patient. I have reviewed all diagnostic interpretations and treatment plans as written. I was present for the key portions of any procedures performed and the inclusive time noted in any critical care statement. Critical care time includes patient management by me, time spent at the patients bedside,  time to review lab and imaging results, discussing patient care, documentation in the medical record, and time spent with family or caregiver.    Patient Care Considerations:    SEPSIS FLUIDS: 30 cc/kg bolus was not indicated in this patient due to no hypotension or elevated lactic acid      Consultants/Shared Management Plan:    Consultant: I have discussed the case with Dr. Quesada who agrees to consult on the patient.  PCP: I have discussed the case with Dr. Castro who agrees to accept the patient for admission.    Social Determinants of Health:    Patient has presented with family members who are responsible, reliable and will ensure follow up care.      Disposition and Care Coordination:    Admit:   Through independent evaluation of the patient's history, physical, and imperical data, the patient meets criteria for inpatient admission to the hospital.        Final diagnoses:   Skin ulcer of sacrum, unspecified ulcer stage   Gluteal abscess        ED Disposition       ED Disposition   Decision to Admit    Condition   --    Comment   Level of Care: Med/Surg [1]   Diagnosis: Sacral ulcer [[id number]]   Admitting Physician: SILVER CASTRO [[id number]]   Attending Physician: SILVER CASTRO [[id number]]   Certification: I Certify That Inpatient Hospital Services Are Medically Necessary For Greater Than 2 Midnights                 This medical record created using voice recognition software.             Yash Curiel MD  10/29/24 2154      Electronically signed by Yash Curiel MD at 10/29/24 2154       Current Facility-Administered Medications   Medication Dose Route Frequency Provider Last Rate Last Admin   •  acetaminophen (TYLENOL) tablet 650 mg  650 mg Oral Q4H PRN Anthony Castro MD        Or   • acetaminophen (TYLENOL) 160 MG/5ML oral solution 650 mg  650 mg Oral Q4H PRN Anthony Castro MD        Or   • acetaminophen (TYLENOL) suppository 650 mg  650 mg Rectal Q4H PRN Anthony Castro MD       • ALPRAZolam (XANAX) tablet 0.5 mg  0.5 mg Oral Q8H PRN Anthony Castro MD   0.5 mg at 10/30/24 0002   • aluminum-magnesium hydroxide-simethicone (MAALOX MAX) 400-400-40 MG/5ML suspension 15 mL  15 mL Oral Q6H PRN Anthony Castro MD       • baclofen (LIORESAL) tablet 20 mg  20 mg Oral BID Anthony Castro MD   20 mg at 10/30/24 0933   • sennosides-docusate (PERICOLACE) 8.6-50 MG per tablet 2 tablet  2 tablet Oral BID PRN Anthony Castro MD        And   • polyethylene glycol (MIRALAX) packet 17 g  17 g Oral Daily PRN Anthony Castro MD        And   • bisacodyl (DULCOLAX) EC tablet 5 mg  5 mg Oral Daily PRN Anthony Castro MD        And   • bisacodyl (DULCOLAX) suppository 10 mg  10 mg Rectal Daily PRN Anthony Castro MD       • busPIRone (BUSPAR) tablet 15 mg  15 mg Oral Q12H Anthony Castro MD   15 mg at 10/30/24 0933   • [Transfer Hold] collagenase ointment 1 Application  1 Application Topical Q24H Saeid Quesada MD       • Enoxaparin Sodium (LOVENOX) syringe 40 mg  40 mg Subcutaneous Daily Anthony Castro MD       • furosemide (LASIX) tablet 20 mg  20 mg Oral Q24H Anthony Castro MD       • HYDROmorphone (DILAUDID) injection 0.25 mg  0.25 mg Intravenous Q5 Min PRN Zena Shipley CRNA       • [Transfer Hold] HYDROmorphone (DILAUDID) injection 0.5 mg  0.5 mg Intravenous Q2H PRN Sylvia Carrillo MD   0.5 mg at 10/30/24 0933    And   • [Transfer Hold] naloxone (NARCAN) injection 0.4 mg  0.4 mg Intravenous Q5 Min PRN Sylvia Carrillo MD       • hydrOXYzine (ATARAX) tablet 50 mg  50 mg Oral Daily PRN Anthony Castro MD       • lactated ringers  infusion  75 mL/hr Intravenous Continuous Anthony Castro MD 75 mL/hr at 10/30/24 0907 75 mL/hr at 10/30/24 0907   • meperidine (DEMEROL) injection 12.5 mg  12.5 mg Intravenous Q15 Min PRN Zena Shipley CRNA       • [Transfer Hold] Nerve Block solution 30.7 mL  30.7 mL Injection Once Saeid Quesada MD       • ondansetron (ZOFRAN) injection 4 mg  4 mg Intravenous Q6H PRN Anthony Castro MD       • oxyCODONE (ROXICODONE) immediate release tablet 5 mg  5 mg Oral Q15 Min PRN Zena Shipley CRNA   5 mg at 10/30/24 1206   • oxyCODONE-acetaminophen (PERCOCET) 5-325 MG per tablet 1 tablet  1 tablet Oral Q8H PRN Anthony Castro MD   1 tablet at 10/30/24 0904   • pantoprazole (PROTONIX) EC tablet 40 mg  40 mg Oral Q AM Anthony Casrto MD       • Pharmacy to dose vancomycin   Does not apply Continuous PRN Anthony Castro MD       • piperacillin-tazobactam (ZOSYN) IVPB 3.375 g IVPB in 100 mL NS (VTB)  3.375 g Intravenous Q8H Anthony Castro MD   3.375 g at 10/30/24 1105   • promethazine (PHENERGAN) suppository 25 mg  25 mg Rectal Once PRN Zena Shipley CRNA        Or   • promethazine (PHENERGAN) tablet 25 mg  25 mg Oral Once PRN Zena Shipley CRNA       • QUEtiapine (SEROquel) tablet 400 mg  400 mg Oral Nightly Anthony Castro MD       • [Transfer Hold] Mimi-Bid Probiotic tablet 1 tablet  1 tablet Oral Daily Anthony Castro MD   1 tablet at 10/30/24 0904   • sodium chloride 0.9 % flush 10 mL  10 mL Intravenous PRN Anthony Castro MD       • tiZANidine (ZANAFLEX) tablet 4 mg  4 mg Oral Q12H PRN Anthony Castro MD       • vancomycin (VANCOCIN) 1,000 mg in sodium chloride 0.9 % 250 mL IVPB-VTB  1,000 mg Intravenous Q12H Anthony Castro  mL/hr at 10/30/24 0904 1,000 mg at 10/30/24 0904        Operative/Procedure Notes (last 48 hours)        Saeid Quesada MD at 10/30/24 1122       Summary:Operative note                 OP  NOTE  DEBRIDEMENT OF ISCHIAL ULCER/BUTTOCKS WOUND  Procedure Report    Patient Name:  Haider Jacobs  YOB: 1970  [id number]    Date of Surgery:  10/30/2024     Indications: See last clinic note for indications, discussion of risk benefits and alternatives    Pre-op Diagnosis:   Gluteal abscess [L02.31]       Post-Op Diagnosis Codes:     * Gluteal abscess [L02.31]    Procedure/CPT® Codes:      Procedure(s): Incision and drainage abscess with debridement of soft tissue left buttock wound    Staff:  Surgeon(s):  Saeid Quesada MD    Assistant: Laura Ware RN CSA    Anesthesia: General, Local    Estimated Blood Loss:  10 cc    Implants:    Nothing was implanted during the procedure    Specimen:          Specimens       ID Source Type Tests Collected By Collected At Frozen?    1 Buttock, Left Wound WOUND CULTURE   Saeid Quesada MD 10/30/24 1123     Description: Soft tissue culture    This specimen was not marked as sent.    A Buttock, Left Tissue TISSUE PATHOLOGY EXAM   Saeid Quesada MD 10/30/24 1122     Description: soft tissue    This specimen was not marked as sent.              Findings: Minimal amount of purulence drained from left buttock wound.  Culture taken.  Sharp excisional debridement of soft tissue left buttock wound    Complications: None    Description of Procedure:   After all questions were answered, consent was verified.  Patient brought to the operating room per stretcher placed in supine position arms out all extremities padded.  Bilateral lower extremity SCDs placed.  Anesthesia induced.  Patient on scheduled antibiotics.  Patient rotated to the right with all extremities padded.  Patient's left buttock prepped with Betadine.  Draped in usual sterile fashion.  Critical timeout taken.  Began the procedure excising necrotic tissue sharply with scissors.  I entered an abscess cavity with culture taken.  It was sent to pathology.  I then further debrided  the left buttock wound sharply with scissors until I encountered bleeding tissue.  The wound measured approximately 3 x 3 x 5 cm.  I then obtained hemostasis.  I irrigated with sterile water.  I placed Santyl throughout the wound.  I packed with moistened Kerlix.  I infiltrated with local anesthesia.  Dressing applied.  At the end of the procedure all counts were correct.  I was present for the procedure.    Assistant: Laura Ware RN CSA was responsible for performing the following activities: Retraction, Suction, and Placing Dressing and their skilled assistance was necessary for the success of this case.    Saeid Quesada MD     Date: 10/30/2024  Time: 11:34 EDT     Electronically signed by Saeid Quesada MD at 10/30/24 1136       Physician Progress Notes (last 24 hours)  Notes from 10/29/24 1212 through 10/30/24 1212   No notes of this type exist for this encounter.          Consult Notes (last 24 hours)        Francie Kevin APRN at 10/30/24 0879        Consult Orders    1. Surgery (on-call MD unless specified) [[id number]] ordered by Anthony Castro MD at 10/29/24 2120              Attestation signed by Saeid Quesada MD at 10/30/24 1006    I have reviewed this documentation and agree.    Patient seen.  Agree with above.  Wheelchair-bound after spinal cord injury.  History of tobacco abuse.  Developed a left buttock wound which has been followed by wound care clinic.  Recently developed fever and worsening smell came to the emergency department for further evaluation.  WBC 8.  CT abdomen pelvis with 3 x 1 cm peripherally enhancing collection within the deeper soft tissues of the left buttock.  Admitted.  Started on antibiotics.  No blood thinner use.    Afebrile, vital signs stable  Left buttock wound with foul odor, no evidence of necrotizing soft tissue infection, no erythema    Impression  Left buttock wound with abscess    Plan  I have reviewed the patient's workup  with results mentioned above.  I recommended incision and drainage of his wound.  Procedure and recovery discussed.  Benefits and alternatives discussed.  Risk procedure including risk of anesthesia, bleeding, infection, need for more surgery, having a chronic wound which will take a long time to heal, stroke, blood clot, pneumonia discussed.  All questions answered.  He agrees with the plan.  Consent obtained.  N.p.o. for now.    Okay for regular diet after surgery.  Follow-up with wound care clinic after surgery.    Smoking cessation counseling performed.                  History and Physical    Patient Name:  Haider Jacobs  YOB: 1970  [id number]    Referring Provider:  Dr. Castro      Patient Care Team:  Ronen Ohara MD as PCP - General (Internal Medicine)  Rosey Fallon APRN as Nurse Practitioner (Urology)    Reason for consult/Chief complaint: left gluteal wound    Subjective .     History of present illness:  Haider Jacobs is a 53 y.o. gentleman with a past medical history of anxiety, quadriplegia now able to ambulate with a walker, left hand contracture, prior drug abuse, hematuria, 1 PPD cigarette smoker, neurogenic bladder,spinal cord injury at C1-C4 who was stent to the ED at [facility] from the wound care clinic for evaluation of left gluteal wound.   Patient reports the wound started approximately one month ago.  He typically sits up in a wheelchair all day or sits in a recliner.        History:  Past Medical History:   Diagnosis Date   • Anxiety    • Chronic tetraplegia    • Contracture of hand     LEFT HAND TIGHT HAND CONTRACTURE. RIGHT HAD CAN OPEN 30%.   • Drug abuse    • Hematuria    • Neurogenic bladder    • Spinal cord injury at C1-C4 level without spinal injury    • Urinary retention        Past Surgical History:   Procedure Laterality Date   • ANKLE SURGERY     • CERVICAL FUSION      C2-C7   • FRACTURE SURGERY      ARM   • URETEROSCOPY LASER LITHOTRIPSY WITH STENT  "INSERTION Bilateral 11/21/2023    Procedure: CYSTOSCOPY BILATERAL RETROGRADE PYELOGRAM BLADDER STONE EXTRACTION;  Surgeon: Jennifer Bowman MD;  Location: [facility] MAIN OR;  Service: Urology;  Laterality: Bilateral;       History reviewed. No pertinent family history.    Social History     Tobacco Use   • Smoking status: Every Day     Current packs/day: 1.00     Types: Cigarettes     Passive exposure: Current   • Smokeless tobacco: Never   Vaping Use   • Vaping status: Never Used   Substance Use Topics   • Alcohol use: Not Currently   • Drug use: Yes     Comment: fentanyl powder /percocets \"off the street\" for managing pain       Review of Systems:  A complete ROS was performed and all are negative except for what is documented in the HPI.    MEDS:  Prior to Admission medications    Medication Sig Start Date End Date Taking? Authorizing Provider   baclofen (LIORESAL) 20 MG tablet Take 1 tablet by mouth 3 (Three) Times a Day.    José Antonio Esposito MD   busPIRone (BUSPAR) 30 MG tablet Take 1 tablet by mouth 2 (Two) Times a Day.    José Antonio Esposito MD   folic acid (FOLVITE) 1 MG tablet Take 1 tablet by mouth Daily.    José Antonio Esposito MD   gabapentin (NEURONTIN) 300 MG capsule Take 1 capsule by mouth 3 (Three) Times a Day.    José Antonio Esposito MD   hydrOXYzine (ATARAX) 50 MG tablet Take 1 tablet by mouth 2 (Two) Times a Day As Needed for Itching.    José Antonio Esposito MD   ibuprofen (ADVIL,MOTRIN) 800 MG tablet Take 1 tablet by mouth Every 6 (Six) Hours As Needed for Mild Pain.    José Antonio Esposito MD   Lactobacillus (Florajen Acidophilus) capsule Take 1 capsule by mouth Daily. 1/26/24   Rosey Fallon APRN   Nutritional Supplements (Tristian) pack Take 1 packet by mouth 2 (Two) Times a Day. 9/11/24   Doreen Alaniz MD   OLANZapine (zyPREXA) 5 MG tablet Take 1 tablet by mouth Every Night.    José Antonio Esposito MD   omeprazole (priLOSEC) 40 MG capsule Take 1 capsule by mouth Daily. " "12/29/21   ProviderJosé Antonio MD   QUEtiapine (SEROquel) 400 MG tablet Take 1 tablet by mouth Every Night.    ProviderJosé Antonio MD   tamsulosin (FLOMAX) 0.4 MG capsule 24 hr capsule Take 2 capsules by mouth Daily for 360 days. 11/8/23 11/2/24  Rosey Fallon APRN   traZODone (DESYREL) 150 MG tablet Take 1 tablet by mouth Every Night.    ProviderJosé Antonio MD        baclofen, 20 mg, Oral, BID  busPIRone, 15 mg, Oral, Q12H  enoxaparin, 40 mg, Subcutaneous, Daily  furosemide, 20 mg, Oral, Q24H  pantoprazole, 40 mg, Oral, Q AM  piperacillin-tazobactam, 3.375 g, Intravenous, Q8H  QUEtiapine, 400 mg, Oral, Nightly  Mimi-Bid Probiotic, 1 tablet, Oral, Daily  vancomycin, 1,000 mg, Intravenous, Q12H         lactated ringers, 75 mL/hr  Pharmacy to dose vancomycin,          Allergies:  Varenicline    Objective         Physical Exam:      Constitutional:  well nourished, no acute distress, appears stated age BP 91/58 (BP Location: Left arm, Patient Position: Lying)   Pulse 75   Temp 98.8 °F (37.1 °C) (Oral)   Resp 18   Ht 172.7 cm (67.99\")   Wt 63.5 kg (140 lb)   SpO2 93%   BMI 21.29 kg/m²    Eyes:  anicteric sclerae, moist conjunctivae, no lid lag, PERRLA  ENMT:  oropharynx clear, moist mucous membranes, good dentition  Neck:   full ROM, trachea midline, no thyromegaly  Cardiovascular: RRR, S1 and S2 present, no MRG, heart rate 75, no pedal edema  Respiratory: lungs CTA, respirations even and unlabored  GI:  Abdomen soft, nontender, nondistended, no HSM     Skin:  warm and dry, normal turgor, no rashes. Left gluteal wound with greenish necrotic eschar on one side, tender to palpation, foul smelling thick yellow tan drainage   Psychiatric:  alert and oriented x3, intact judgment and insight, cooperative         Results Review: I have reviewed the patient's labs and imaging including CBC, BMP    LABS/IMAGING:    WBC   Date Value Ref Range Status   10/30/2024 6.10 3.40 - 10.80 10*3/mm3 Final     RBC "   Date Value Ref Range Status   10/30/2024 3.85 (L) 4.14 - 5.80 10*6/mm3 Final     Hemoglobin   Date Value Ref Range Status   10/30/2024 11.1 (L) 13.0 - 17.7 g/dL Final     Hematocrit   Date Value Ref Range Status   10/30/2024 33.5 (L) 37.5 - 51.0 % Final     MCV   Date Value Ref Range Status   10/30/2024 87.0 79.0 - 97.0 fL Final     MCH   Date Value Ref Range Status   10/30/2024 28.8 26.6 - 33.0 pg Final     MCHC   Date Value Ref Range Status   10/30/2024 33.1 31.5 - 35.7 g/dL Final     RDW   Date Value Ref Range Status   10/30/2024 13.5 12.3 - 15.4 % Final     RDW-SD   Date Value Ref Range Status   10/30/2024 42.2 37.0 - 54.0 fl Final     MPV   Date Value Ref Range Status   10/30/2024 11.0 6.0 - 12.0 fL Final     Platelets   Date Value Ref Range Status   10/30/2024 153 140 - 450 10*3/mm3 Final     Neutrophil %   Date Value Ref Range Status   10/30/2024 53.2 42.7 - 76.0 % Final     Lymphocyte %   Date Value Ref Range Status   10/30/2024 32.1 19.6 - 45.3 % Final     Monocyte %   Date Value Ref Range Status   10/30/2024 10.3 5.0 - 12.0 % Final     Eosinophil %   Date Value Ref Range Status   10/30/2024 3.4 0.3 - 6.2 % Final     Basophil %   Date Value Ref Range Status   10/30/2024 0.8 0.0 - 1.5 % Final     Immature Grans %   Date Value Ref Range Status   10/30/2024 0.2 0.0 - 0.5 % Final     Neutrophils, Absolute   Date Value Ref Range Status   10/30/2024 3.24 1.70 - 7.00 10*3/mm3 Final     Lymphocytes, Absolute   Date Value Ref Range Status   10/30/2024 1.96 0.70 - 3.10 10*3/mm3 Final     Monocytes, Absolute   Date Value Ref Range Status   10/30/2024 0.63 0.10 - 0.90 10*3/mm3 Final     Eosinophils, Absolute   Date Value Ref Range Status   10/30/2024 0.21 0.00 - 0.40 10*3/mm3 Final     Basophils, Absolute   Date Value Ref Range Status   10/30/2024 0.05 0.00 - 0.20 10*3/mm3 Final     Immature Grans, Absolute   Date Value Ref Range Status   10/30/2024 0.01 0.00 - 0.05 10*3/mm3 Final     nRBC   Date Value Ref Range  "Status   10/30/2024 0.0 0.0 - 0.2 /100 WBC Final        Basic Metabolic Panel    Sodium Sodium   Date Value Ref Range Status   10/30/2024 138 136 - 145 mmol/L Final   10/29/2024 137 136 - 145 mmol/L Final      Potassium Potassium   Date Value Ref Range Status   10/30/2024 4.0 3.5 - 5.2 mmol/L Final   10/29/2024 4.3 3.5 - 5.2 mmol/L Final      Chloride Chloride   Date Value Ref Range Status   10/30/2024 102 98 - 107 mmol/L Final   10/29/2024 99 98 - 107 mmol/L Final      Bicarbonate No results found for: \"PLASMABICARB\"   BUN BUN   Date Value Ref Range Status   10/30/2024 14 6 - 20 mg/dL Final   10/29/2024 16 6 - 20 mg/dL Final      Creatinine Creatinine   Date Value Ref Range Status   10/30/2024 0.72 (L) 0.76 - 1.27 mg/dL Final   10/29/2024 0.84 0.76 - 1.27 mg/dL Final      Calcium Calcium   Date Value Ref Range Status   10/30/2024 8.8 8.6 - 10.5 mg/dL Final   10/29/2024 8.9 8.6 - 10.5 mg/dL Final      Glucose      No components found for: \"GLUCOSE.*\"        Lab Results   Component Value Date    GLUCOSE 100 (H) 10/30/2024    BUN 14 10/30/2024    CREATININE 0.72 (L) 10/30/2024    EGFRIFNONA 96 02/07/2022    BCR 19.4 10/30/2024    K 4.0 10/30/2024    CO2 26.7 10/30/2024    CALCIUM 8.8 10/30/2024    ALBUMIN 3.2 (L) 10/30/2024    LABIL2 1.5 05/27/2020    AST 13 10/29/2024    ALT 8 10/29/2024          CT Abdomen Pelvis With Contrast    Result Date: 10/29/2024  CT ABDOMEN PELVIS W CONTRAST Date of Exam: 10/29/2024 8:06 PM EDT Indication: wound/sepsis eval Abdominal pain. Comparison: 10/19/2023 Technique: Axial CT images were obtained of the abdomen and pelvis after the uneventful intravenous administration of iodinated contrast. Reconstructed coronal and sagittal images were also obtained. Automated exposure control and iterative construction methods were used. FINDINGS: Lung bases: No masses. No consolidation. Liver:No masses. No intrahepatic biliary ductal dilatation. Spleen:No masses. No perisplenic hematoma. " Pancreas:No pancreatic masses. No evidence of pancreatitis. Gallbladder and common bile duct:No evidence of cholelithiasis. No evidence of cholecystitis. Adrenal glands:No adrenal masses Kidneys and ureters: 2.1 cm left renal cyst. No calculi present within the ureters. Normal caliber ureters. Urinary bladder: Lobulated appearance of the urinary bladder wall. Small bowel:Normal caliber small bowel. Large bowel: Constipation in the appropriate clinical setting. Appendix: Not seen however there is no evidence of appendicitis. GENITOURINARY: Normal prostate Ascites or pneumoperitoneum:None. Adenopathy:None present Osseous structures: Left gluteal decubitus ulcer. This appears to be partially filled with packing material. There is a 3.1 cm x 1.3 cm peripherally enhancing collection within the deeper soft tissues. Degenerative changes of the hips. Osteopenia. Degenerative changes of the lumbar spine. Mild loss of height at the superior endplate of L1. Mild anterior wedging of T11 and T10. Other findings: None     Impression: 1. Sacral gluteal ulcer. 3.1 cm x 1.3 cm peripherally enhancing collection concerning for an abscess within the deeper soft tissues. 2. Constipation in the appropriate clinical setting. Electronically Signed: Uriel Olivera MD  10/29/2024 9:01 PM EDT  Workstation ID: [id number]          Assessment & Plan     Left gluteal abscess   Npo   Case request and consent for DEBRIDEMENT OF Left ISCHIAL ULCER/BUTTOCKS WOUND: clean and remove unhealthy tissue from the wound using surgical instruments by Dr. Quesada risks, benefits, alternatives discussed               Electronically signed by RAMANA Lechuga, 10/30/24, 8:53 AM EDT.     Electronically signed by Saeid Quesada MD at 10/30/24 1009

## 2024-10-30 NOTE — H&P (VIEW-ONLY)
History and Physical    Patient Name:  Haider Jacobs  YOB: 1970  [id number]    Referring Provider:  Dr. Castro      Patient Care Team:  Ronen Ohara MD as PCP - General (Internal Medicine)  Rosey Fallon APRN as Nurse Practitioner (Urology)    Reason for consult/Chief complaint: left gluteal wound    Subjective .     History of present illness:  Haider Jacobs is a 53 y.o. gentleman with a past medical history of anxiety, quadriplegia now able to ambulate with a walker, left hand contracture, prior drug abuse, hematuria, 1 PPD cigarette smoker, neurogenic bladder,spinal cord injury at C1-C4 who was stent to the ED at [facility] from the wound care clinic for evaluation of left gluteal wound.   Patient reports the wound started approximately one month ago.  He typically sits up in a wheelchair all day or sits in a recliner.        History:  Past Medical History:   Diagnosis Date    Anxiety     Chronic tetraplegia     Contracture of hand     LEFT HAND TIGHT HAND CONTRACTURE. RIGHT HAD CAN OPEN 30%.    Drug abuse     Hematuria     Neurogenic bladder     Spinal cord injury at C1-C4 level without spinal injury     Urinary retention        Past Surgical History:   Procedure Laterality Date    ANKLE SURGERY      CERVICAL FUSION      C2-C7    FRACTURE SURGERY      ARM    URETEROSCOPY LASER LITHOTRIPSY WITH STENT INSERTION Bilateral 11/21/2023    Procedure: CYSTOSCOPY BILATERAL RETROGRADE PYELOGRAM BLADDER STONE EXTRACTION;  Surgeon: Jennifer Bowman MD;  Location: [facility] MAIN OR;  Service: Urology;  Laterality: Bilateral;       History reviewed. No pertinent family history.    Social History     Tobacco Use    Smoking status: Every Day     Current packs/day: 1.00     Types: Cigarettes     Passive exposure: Current    Smokeless tobacco: Never   Vaping Use    Vaping status: Never Used   Substance Use Topics    Alcohol use: Not Currently    Drug use: Yes     Comment: fentanyl powder /percocets  "\"off the street\" for managing pain       Review of Systems:  A complete ROS was performed and all are negative except for what is documented in the HPI.    MEDS:  Prior to Admission medications    Medication Sig Start Date End Date Taking? Authorizing Provider   baclofen (LIORESAL) 20 MG tablet Take 1 tablet by mouth 3 (Three) Times a Day.    José Antonio Esposito MD   busPIRone (BUSPAR) 30 MG tablet Take 1 tablet by mouth 2 (Two) Times a Day.    José Antonio Esposito MD   folic acid (FOLVITE) 1 MG tablet Take 1 tablet by mouth Daily.    José Antonio Esposito MD   gabapentin (NEURONTIN) 300 MG capsule Take 1 capsule by mouth 3 (Three) Times a Day.    José Antonio Esposito MD   hydrOXYzine (ATARAX) 50 MG tablet Take 1 tablet by mouth 2 (Two) Times a Day As Needed for Itching.    José Antonio Esposito MD   ibuprofen (ADVIL,MOTRIN) 800 MG tablet Take 1 tablet by mouth Every 6 (Six) Hours As Needed for Mild Pain.    José Antonio Esposito MD   Lactobacillus (Florajen Acidophilus) capsule Take 1 capsule by mouth Daily. 1/26/24   Rosey Fallon APRN   Nutritional Supplements (Tristian) pack Take 1 packet by mouth 2 (Two) Times a Day. 9/11/24   Doreen Alaniz MD   OLANZapine (zyPREXA) 5 MG tablet Take 1 tablet by mouth Every Night.    José Antonio Esposito MD   omeprazole (priLOSEC) 40 MG capsule Take 1 capsule by mouth Daily. 12/29/21   José Antonio Esposito MD   QUEtiapine (SEROquel) 400 MG tablet Take 1 tablet by mouth Every Night.    José Antonio Esposito MD   tamsulosin (FLOMAX) 0.4 MG capsule 24 hr capsule Take 2 capsules by mouth Daily for 360 days. 11/8/23 11/2/24  Rosey Fallon APRN   traZODone (DESYREL) 150 MG tablet Take 1 tablet by mouth Every Night.    José Antonio Esposito MD        baclofen, 20 mg, Oral, BID  busPIRone, 15 mg, Oral, Q12H  enoxaparin, 40 mg, Subcutaneous, Daily  furosemide, 20 mg, Oral, Q24H  pantoprazole, 40 mg, Oral, Q AM  piperacillin-tazobactam, 3.375 g, Intravenous, " "Q8H  QUEtiapine, 400 mg, Oral, Nightly  Mimi-Bid Probiotic, 1 tablet, Oral, Daily  vancomycin, 1,000 mg, Intravenous, Q12H         lactated ringers, 75 mL/hr  Pharmacy to dose vancomycin,          Allergies:  Varenicline    Objective         Physical Exam:      Constitutional:  well nourished, no acute distress, appears stated age BP 91/58 (BP Location: Left arm, Patient Position: Lying)   Pulse 75   Temp 98.8 °F (37.1 °C) (Oral)   Resp 18   Ht 172.7 cm (67.99\")   Wt 63.5 kg (140 lb)   SpO2 93%   BMI 21.29 kg/m²    Eyes:  anicteric sclerae, moist conjunctivae, no lid lag, PERRLA  ENMT:  oropharynx clear, moist mucous membranes, good dentition  Neck:   full ROM, trachea midline, no thyromegaly  Cardiovascular: RRR, S1 and S2 present, no MRG, heart rate 75, no pedal edema  Respiratory: lungs CTA, respirations even and unlabored  GI:  Abdomen soft, nontender, nondistended, no HSM     Skin:  warm and dry, normal turgor, no rashes. Left gluteal wound with greenish necrotic eschar on one side, tender to palpation, foul smelling thick yellow tan drainage   Psychiatric:  alert and oriented x3, intact judgment and insight, cooperative         Results Review: I have reviewed the patient's labs and imaging including CBC, BMP    LABS/IMAGING:    WBC   Date Value Ref Range Status   10/30/2024 6.10 3.40 - 10.80 10*3/mm3 Final     RBC   Date Value Ref Range Status   10/30/2024 3.85 (L) 4.14 - 5.80 10*6/mm3 Final     Hemoglobin   Date Value Ref Range Status   10/30/2024 11.1 (L) 13.0 - 17.7 g/dL Final     Hematocrit   Date Value Ref Range Status   10/30/2024 33.5 (L) 37.5 - 51.0 % Final     MCV   Date Value Ref Range Status   10/30/2024 87.0 79.0 - 97.0 fL Final     MCH   Date Value Ref Range Status   10/30/2024 28.8 26.6 - 33.0 pg Final     MCHC   Date Value Ref Range Status   10/30/2024 33.1 31.5 - 35.7 g/dL Final     RDW   Date Value Ref Range Status   10/30/2024 13.5 12.3 - 15.4 % Final     RDW-SD   Date Value Ref Range " "Status   10/30/2024 42.2 37.0 - 54.0 fl Final     MPV   Date Value Ref Range Status   10/30/2024 11.0 6.0 - 12.0 fL Final     Platelets   Date Value Ref Range Status   10/30/2024 153 140 - 450 10*3/mm3 Final     Neutrophil %   Date Value Ref Range Status   10/30/2024 53.2 42.7 - 76.0 % Final     Lymphocyte %   Date Value Ref Range Status   10/30/2024 32.1 19.6 - 45.3 % Final     Monocyte %   Date Value Ref Range Status   10/30/2024 10.3 5.0 - 12.0 % Final     Eosinophil %   Date Value Ref Range Status   10/30/2024 3.4 0.3 - 6.2 % Final     Basophil %   Date Value Ref Range Status   10/30/2024 0.8 0.0 - 1.5 % Final     Immature Grans %   Date Value Ref Range Status   10/30/2024 0.2 0.0 - 0.5 % Final     Neutrophils, Absolute   Date Value Ref Range Status   10/30/2024 3.24 1.70 - 7.00 10*3/mm3 Final     Lymphocytes, Absolute   Date Value Ref Range Status   10/30/2024 1.96 0.70 - 3.10 10*3/mm3 Final     Monocytes, Absolute   Date Value Ref Range Status   10/30/2024 0.63 0.10 - 0.90 10*3/mm3 Final     Eosinophils, Absolute   Date Value Ref Range Status   10/30/2024 0.21 0.00 - 0.40 10*3/mm3 Final     Basophils, Absolute   Date Value Ref Range Status   10/30/2024 0.05 0.00 - 0.20 10*3/mm3 Final     Immature Grans, Absolute   Date Value Ref Range Status   10/30/2024 0.01 0.00 - 0.05 10*3/mm3 Final     nRBC   Date Value Ref Range Status   10/30/2024 0.0 0.0 - 0.2 /100 WBC Final        Basic Metabolic Panel    Sodium Sodium   Date Value Ref Range Status   10/30/2024 138 136 - 145 mmol/L Final   10/29/2024 137 136 - 145 mmol/L Final      Potassium Potassium   Date Value Ref Range Status   10/30/2024 4.0 3.5 - 5.2 mmol/L Final   10/29/2024 4.3 3.5 - 5.2 mmol/L Final      Chloride Chloride   Date Value Ref Range Status   10/30/2024 102 98 - 107 mmol/L Final   10/29/2024 99 98 - 107 mmol/L Final      Bicarbonate No results found for: \"PLASMABICARB\"   BUN BUN   Date Value Ref Range Status   10/30/2024 14 6 - 20 mg/dL Final " "  10/29/2024 16 6 - 20 mg/dL Final      Creatinine Creatinine   Date Value Ref Range Status   10/30/2024 0.72 (L) 0.76 - 1.27 mg/dL Final   10/29/2024 0.84 0.76 - 1.27 mg/dL Final      Calcium Calcium   Date Value Ref Range Status   10/30/2024 8.8 8.6 - 10.5 mg/dL Final   10/29/2024 8.9 8.6 - 10.5 mg/dL Final      Glucose      No components found for: \"GLUCOSE.*\"        Lab Results   Component Value Date    GLUCOSE 100 (H) 10/30/2024    BUN 14 10/30/2024    CREATININE 0.72 (L) 10/30/2024    EGFRIFNONA 96 02/07/2022    BCR 19.4 10/30/2024    K 4.0 10/30/2024    CO2 26.7 10/30/2024    CALCIUM 8.8 10/30/2024    ALBUMIN 3.2 (L) 10/30/2024    LABIL2 1.5 05/27/2020    AST 13 10/29/2024    ALT 8 10/29/2024          CT Abdomen Pelvis With Contrast    Result Date: 10/29/2024  CT ABDOMEN PELVIS W CONTRAST Date of Exam: 10/29/2024 8:06 PM EDT Indication: wound/sepsis eval Abdominal pain. Comparison: 10/19/2023 Technique: Axial CT images were obtained of the abdomen and pelvis after the uneventful intravenous administration of iodinated contrast. Reconstructed coronal and sagittal images were also obtained. Automated exposure control and iterative construction methods were used. FINDINGS: Lung bases: No masses. No consolidation. Liver:No masses. No intrahepatic biliary ductal dilatation. Spleen:No masses. No perisplenic hematoma. Pancreas:No pancreatic masses. No evidence of pancreatitis. Gallbladder and common bile duct:No evidence of cholelithiasis. No evidence of cholecystitis. Adrenal glands:No adrenal masses Kidneys and ureters: 2.1 cm left renal cyst. No calculi present within the ureters. Normal caliber ureters. Urinary bladder: Lobulated appearance of the urinary bladder wall. Small bowel:Normal caliber small bowel. Large bowel: Constipation in the appropriate clinical setting. Appendix: Not seen however there is no evidence of appendicitis. GENITOURINARY: Normal prostate Ascites or pneumoperitoneum:None. " Adenopathy:None present Osseous structures: Left gluteal decubitus ulcer. This appears to be partially filled with packing material. There is a 3.1 cm x 1.3 cm peripherally enhancing collection within the deeper soft tissues. Degenerative changes of the hips. Osteopenia. Degenerative changes of the lumbar spine. Mild loss of height at the superior endplate of L1. Mild anterior wedging of T11 and T10. Other findings: None     Impression: 1. Sacral gluteal ulcer. 3.1 cm x 1.3 cm peripherally enhancing collection concerning for an abscess within the deeper soft tissues. 2. Constipation in the appropriate clinical setting. Electronically Signed: Uriel Olivera MD  10/29/2024 9:01 PM EDT  Workstation ID: [id number]          Assessment & Plan     Left gluteal abscess   Npo   Case request and consent for DEBRIDEMENT OF Left ISCHIAL ULCER/BUTTOCKS WOUND: clean and remove unhealthy tissue from the wound using surgical instruments by Dr. Quesada risks, benefits, alternatives discussed               Electronically signed by RAMANA Lechuga, 10/30/24, 8:53 AM EDT.

## 2024-10-30 NOTE — SIGNIFICANT NOTE
Wound Eval / Progress Noted    [facility]     Patient Name: Haider Jacobs  : 1970  MRN: [id number]  Today's Date: 10/30/2024                 Admit Date: 10/29/2024    Visit Dx:    ICD-10-CM ICD-9-CM   1. Skin ulcer of sacrum, unspecified ulcer stage  L98.429 707.8   2. Gluteal abscess  L02.31 682.5         Gluteal abscess        Past Medical History:   Diagnosis Date    Anxiety     Chronic tetraplegia     Contracture of hand     LEFT HAND TIGHT HAND CONTRACTURE. RIGHT HAD CAN OPEN 30%.    Drug abuse     Hematuria     Neurogenic bladder     Spinal cord injury at C1-C4 level without spinal injury     Urinary retention         Past Surgical History:   Procedure Laterality Date    ANKLE SURGERY      CERVICAL FUSION      C2-C7    FRACTURE SURGERY      ARM    URETEROSCOPY LASER LITHOTRIPSY WITH STENT INSERTION Bilateral 2023    Procedure: CYSTOSCOPY BILATERAL RETROGRADE PYELOGRAM BLADDER STONE EXTRACTION;  Surgeon: Jennifer Bomwan MD;  Location: [facility] OR;  Service: Urology;  Laterality: Bilateral;         Physical Assessment:  Wound 24 1004 Left lateral ankle (Active)   Wound Image   10/30/24 0725   Pressure Injury Stage 3 10/30/24 0725   Dressing Appearance intact;dry 10/30/24 0725   Closure None 10/30/24 0725   Base nonblanchable;yellow;red;moist 10/30/24 0725   Red (%), Wound Tissue Color 5 10/30/24 0725   Yellow (%), Wound Tissue Color 95 10/30/24 07   Periwound intact;dry;redness 10/30/24 0725   Periwound Temperature warm 10/30/24 0725   Periwound Skin Turgor soft 10/30/24 0725   Edges open 10/30/24 0725   Wound Length (cm) 1 cm 10/30/24 0725   Wound Width (cm) 0.7 cm 10/30/24 0725   Wound Depth (cm) 0.1 cm 10/30/24 0725   Wound Surface Area (cm^2) 0.7 cm^2 10/30/24 0725   Wound Volume (cm^3) 0.07 cm^3 10/30/24 0725   Drainage Characteristics/Odor serosanguineous 10/30/24 0725   Drainage Amount small 10/30/24 0725   Care, Wound cleansed with;sterile normal saline 10/30/24 0717    Dressing Care dressing applied;silver impregnated;hydrofiber;silicone border foam 10/30/24 0725   Periwound Care absorptive dressing applied 10/30/24 0725       Wound 05/21/24 1005 Left lateral foot (Active)   Wound Image   10/29/24 1436   Dressing Appearance moist drainage;intact 10/29/24 1436   Base moist;pink 10/29/24 1436   Periwound intact 10/29/24 1436   Edges open 10/29/24 1436   Wound Length (cm) 0.3 cm 10/29/24 1436   Wound Width (cm) 0.7 cm 10/29/24 1436   Wound Depth (cm) 0.2 cm 10/29/24 1436   Wound Surface Area (cm^2) 0.21 cm^2 10/29/24 1436   Wound Volume (cm^3) 0.042 cm^3 10/29/24 1436   Drainage Characteristics/Odor serosanguineous 10/29/24 1436   Drainage Amount moderate 10/29/24 1436   Care, Wound cleansed with;sterile normal saline 10/29/24 1436   Dressing Care dressing applied;border dressing;other (see comments) 10/29/24 1436       Wound 07/30/24 1336 gluteal (Active)   Wound Image   10/30/24 0725   Pressure Injury Stage 3 10/30/24 0725   Dressing Appearance intact;dry 10/30/24 0725   Closure None 10/30/24 0725   Base necrotic;moist;slough;red;gray 10/30/24 0725   Periwound intact;dry;indurated;redness 10/30/24 0725   Periwound Temperature warm 10/30/24 0725   Periwound Skin Turgor firm 10/30/24 0725   Edges open 10/30/24 0725   Wound Length (cm) 3.3 cm 10/30/24 0725   Wound Width (cm) 3 cm 10/30/24 0725   Wound Depth (cm) 2.7 cm 10/30/24 0725   Wound Surface Area (cm^2) 9.9 cm^2 10/30/24 0725   Wound Volume (cm^3) 26.73 cm^3 10/30/24 0725   Drainage Characteristics/Odor serosanguineous;malodorous 10/30/24 0725   Drainage Amount moderate 10/30/24 0725   Care, Wound cleansed with;irrigated with;sterile normal saline 10/30/24 0725   Dressing Care dressing applied;dressing moistened;gauze, [id number];silicone border foam 10/30/24 0725   Periwound Care absorptive dressing applied 10/30/24 0725       Wound 10/10/24 1738 Right gluteal Pressure Injury (Active)   Wound Image   10/30/24 0746    Pressure Injury Stage 3 10/30/24 0725   Dressing Appearance intact;dry 10/30/24 0725   Closure None 10/30/24 0725   Base nonblanchable;yellow;slough;moist 10/30/24 0725   Yellow (%), Wound Tissue Color 100 10/30/24 0725   Periwound intact;dry;pink 10/30/24 0725   Periwound Temperature warm 10/30/24 0725   Periwound Skin Turgor soft 10/30/24 0725   Edges open 10/30/24 0725   Wound Length (cm) 1 cm 10/30/24 0725   Wound Width (cm) 0.9 cm 10/30/24 0725   Wound Depth (cm) 0.1 cm 10/30/24 0725   Wound Surface Area (cm^2) 0.9 cm^2 10/30/24 0725   Wound Volume (cm^3) 0.09 cm^3 10/30/24 0725   Drainage Characteristics/Odor yellow 10/30/24 0725   Drainage Amount scant 10/30/24 0725   Care, Wound cleansed with;sterile normal saline 10/30/24 0725   Dressing Care dressing applied;silver impregnated;hydrofiber;silicone border foam 10/30/24 0725   Periwound Care absorptive dressing applied 10/30/24 0725        Wound Check / Follow-up:  Patient seen today for a wound consult. Patient is awake, alert and oriented at the time of assessment. Patient is established at the wound clinic and is being treated for ongoing care of his wound. Wound clinic documentation stating concern for effluent to the left ischial wound; patient is scheduled for surgery later today for a debridement of the left sacral wound.     Left ischial stage III wound dressing removed, strong feculent odor detected during assessment and cleansing of the wound. Odor does not dissipate during cleansing. Majority of the wound base does have red moist tissue however there is a large area of thickened tightly adhered sloughing tissue. Necrotic tissue is yellow to gray in color. Periwound is pink and indurated. Cleansed and irrigated with NS. Filled the wound with NS moistened fluffed gauze; will defer treatment at this time to the surgeon as the patient is having a surgical debridement. Will order a specialty mattress for the patient. Implement Q2H turns and offload at  all times. Keep the patient clean and free from all moisture.     Evolving stage III pressure injury to the right ischium and left lateral ankle with yellow sloughing tissue obscuring the wound base. Periwound is soft and pink. Recommending daily dressing changes with silver impregnated hydrofiber.    Impression: stage III pressure injuries to the bilateral ischial tuberosities and left lateral ankle    Short term goals:  regain skin integrity, skin protection, topical treatment, pressure reduction, moisture prevention, daily dressing changed, quality skin care / hygiene.    Sana Khan RN    10/30/2024    09:09 EDT

## 2024-10-30 NOTE — PROGRESS NOTES
"[facility] Clinical Pharmacy Services: Vancomycin Pharmacokinetic Initial Consult Note    Haider Jacobs is a 53 y.o. male who is on day 1 of pharmacy to dose vancomycin for Skin and Soft Tissue.    Consult Information  Consulting Provider: Matthew Cruz   Planned Duration of Therapy: 7 days  Was Patient Receiving Prior to Admission/Consult?: No  Loading Dose Ordered or Given: 1250 mg on 10/29/24 at 2132  PK/PD Target: -600 mg/L.hr   Relevant ID History: no  Other Antimicrobials: Piperacillin/Tazobactam    Imaging Reviewed?: Yes    Microbiology Data  MRSA PCR performed: No; Result: Not ordered due to excluded indication or presence of suspected abscess  Culture/Source:       Vitals/Labs  Ht: 172.7 cm (68\"); Wt: 63.9 kg (140 lb 14 oz)  Temp (24hrs), Av.3 °F (37.4 °C), Min:98.2 °F (36.8 °C), Max:101.3 °F (38.5 °C)   Estimated Creatinine Clearance: 89.8 mL/min (by C-G formula based on SCr of 0.84 mg/dL).       Results from last 7 days   Lab Units 10/29/24  1805   CREATININE mg/dL 0.84   WBC 10*3/mm3 8.01     Assessment/Plan:    Vancomycin Dose:  1000 mg IV every 12 hours; which provides the following predicted parameters:  Exposure target: AUC24 (range)400-600 mg/L.hr   AUC24,ss: 555 mg/L.hr  Probability of AUC24 > 400: 82 %  Ctrough,ss: 17.1 mg/L  Probability of Ctrough,ss > 20: 37 %  Probability of nephrotoxicity (Lodise JOVANNI ): 13 %  Vanc Random ordered for 10/31/24 at 0600  Patient has order for Renal Function Panel    Pharmacy will follow patient's kidney function and will adjust doses and obtain levels as necessary. Thank you for involving pharmacy in this patient's care. Please contact pharmacy with any questions or concerns.                           Jon Barajas [facility]  Clinical Pharmacist   "

## 2024-10-30 NOTE — SIGNIFICANT NOTE
10/30/24 1100   Physical Therapy Time and Intention   Session Not Performed patient unavailable for treatment  (Pt declined at 0900, surgery today)

## 2024-10-30 NOTE — PLAN OF CARE
Goal Outcome Evaluation:  Plan of Care Reviewed With: patient           Outcome Evaluation: Pt new at admission from ED. Pt AAOx4.Wound care completed during shift. Prn dilaudid given 2x for c/o coccyx 7-8/10 pain, per pt intervention effective. Continue plan of care.

## 2024-10-30 NOTE — ANESTHESIA PREPROCEDURE EVALUATION
Anesthesia Evaluation     Patient summary reviewed and Nursing notes reviewed   no history of anesthetic complications:   NPO Solid Status: > 8 hours  NPO Liquid Status: > 2 hours           Airway   Mallampati: I  TM distance: >3 FB  Neck ROM: full  No difficulty expected  Dental          Pulmonary - negative pulmonary ROS and normal exam   Cardiovascular - negative cardio ROS and normal exam  Exercise tolerance: good (4-7 METS)        Neuro/Psych  (+) neuromuscular disease, psychiatric history Anxiety  GI/Hepatic/Renal/Endo - negative ROS     Musculoskeletal (-) negative ROS    Abdominal  - normal exam   Substance History - negative use     OB/GYN negative ob/gyn ROS         Other - negative ROS       ROS/Med Hx Other: ROS/Med Hx Other: Spinal cord injury at C1-C4 level 2006.  C-3 level tetraparesis.                     Anesthesia Plan    ASA 3     general     (No sux.)  intravenous induction     Anesthetic plan, risks, benefits, and alternatives have been provided, discussed and informed consent has been obtained with: patient.    Plan discussed with CRNA.      CODE STATUS:    Code Status (Patient has no pulse and is not breathing): CPR (Attempt to Resuscitate)  Medical Interventions (Patient has pulse or is breathing): Full Support

## 2024-10-30 NOTE — ANESTHESIA POSTPROCEDURE EVALUATION
Patient: Haider Jacobs    Procedure Summary       Date: 10/30/24 Room / Location: [facility] OR 02 / [facility] MAIN OR    Anesthesia Start: 1043 Anesthesia Stop: 1153    Procedure: DEBRIDEMENT OF ISCHIAL ULCER/BUTTOCKS WOUND: clean and remove unhealthy tissue from the wound using surgical instruments (Left: Buttocks) Diagnosis:       Gluteal abscess      (Gluteal abscess [L02.31])    Surgeons: Saeid Quesada MD Provider: Mónica Marrero MD    Anesthesia Type: general ASA Status: 3            Anesthesia Type: general    Vitals  Vitals Value Taken Time   /63 10/30/24 1240   Temp 36.5 °C (97.7 °F) 10/30/24 1240   Pulse 71 10/30/24 1240   Resp 16 10/30/24 1240   SpO2 99 % 10/30/24 1240           Post Anesthesia Care and Evaluation    Patient location during evaluation: bedside  Patient participation: complete - patient participated  Level of consciousness: awake  Pain management: adequate    Airway patency: patent  PONV Status: none  Cardiovascular status: acceptable and stable  Respiratory status: acceptable  Hydration status: acceptable

## 2024-10-31 LAB
ALBUMIN SERPL-MCNC: 3.4 G/DL (ref 3.5–5.2)
ANION GAP SERPL CALCULATED.3IONS-SCNC: 9 MMOL/L (ref 5–15)
BASOPHILS # BLD AUTO: 0.05 10*3/MM3 (ref 0–0.2)
BASOPHILS NFR BLD AUTO: 0.5 % (ref 0–1.5)
BUN SERPL-MCNC: 13 MG/DL (ref 6–20)
BUN/CREAT SERPL: 16.7 (ref 7–25)
CALCIUM SPEC-SCNC: 8.6 MG/DL (ref 8.6–10.5)
CHLORIDE SERPL-SCNC: 106 MMOL/L (ref 98–107)
CO2 SERPL-SCNC: 26 MMOL/L (ref 22–29)
CREAT SERPL-MCNC: 0.78 MG/DL (ref 0.76–1.27)
CYTO UR: NORMAL
DEPRECATED RDW RBC AUTO: 41.1 FL (ref 37–54)
EGFRCR SERPLBLD CKD-EPI 2021: 105.3 ML/MIN/1.73
EOSINOPHIL # BLD AUTO: 0.1 10*3/MM3 (ref 0–0.4)
EOSINOPHIL NFR BLD AUTO: 1.1 % (ref 0.3–6.2)
ERYTHROCYTE [DISTWIDTH] IN BLOOD BY AUTOMATED COUNT: 13.2 % (ref 12.3–15.4)
GLUCOSE SERPL-MCNC: 149 MG/DL (ref 65–99)
HCT VFR BLD AUTO: 34 % (ref 37.5–51)
HGB BLD-MCNC: 11.5 G/DL (ref 13–17.7)
IMM GRANULOCYTES # BLD AUTO: 0.03 10*3/MM3 (ref 0–0.05)
IMM GRANULOCYTES NFR BLD AUTO: 0.3 % (ref 0–0.5)
LAB AP CASE REPORT: NORMAL
LAB AP CLINICAL INFORMATION: NORMAL
LYMPHOCYTES # BLD AUTO: 1.83 10*3/MM3 (ref 0.7–3.1)
LYMPHOCYTES NFR BLD AUTO: 19.4 % (ref 19.6–45.3)
MCH RBC QN AUTO: 28.8 PG (ref 26.6–33)
MCHC RBC AUTO-ENTMCNC: 33.8 G/DL (ref 31.5–35.7)
MCV RBC AUTO: 85.2 FL (ref 79–97)
MONOCYTES # BLD AUTO: 0.9 10*3/MM3 (ref 0.1–0.9)
MONOCYTES NFR BLD AUTO: 9.6 % (ref 5–12)
NEUTROPHILS NFR BLD AUTO: 6.51 10*3/MM3 (ref 1.7–7)
NEUTROPHILS NFR BLD AUTO: 69.1 % (ref 42.7–76)
NRBC BLD AUTO-RTO: 0 /100 WBC (ref 0–0.2)
PATH REPORT.FINAL DX SPEC: NORMAL
PATH REPORT.GROSS SPEC: NORMAL
PHOSPHATE SERPL-MCNC: 0.9 MG/DL (ref 2.5–4.5)
PLATELET # BLD AUTO: 184 10*3/MM3 (ref 140–450)
PMV BLD AUTO: 11 FL (ref 6–12)
POTASSIUM SERPL-SCNC: 3.5 MMOL/L (ref 3.5–5.2)
RBC # BLD AUTO: 3.99 10*6/MM3 (ref 4.14–5.8)
SODIUM SERPL-SCNC: 141 MMOL/L (ref 136–145)
VANCOMYCIN SERPL-MCNC: 9.8 MCG/ML (ref 5–40)
WBC NRBC COR # BLD AUTO: 9.42 10*3/MM3 (ref 3.4–10.8)

## 2024-10-31 PROCEDURE — 80069 RENAL FUNCTION PANEL: CPT | Performed by: SURGERY

## 2024-10-31 PROCEDURE — 25010000002 HYDROMORPHONE 1 MG/ML SOLUTION: Performed by: SURGERY

## 2024-10-31 PROCEDURE — 97165 OT EVAL LOW COMPLEX 30 MIN: CPT

## 2024-10-31 PROCEDURE — 25810000003 SODIUM CHLORIDE 0.9 % SOLUTION: Performed by: STUDENT IN AN ORGANIZED HEALTH CARE EDUCATION/TRAINING PROGRAM

## 2024-10-31 PROCEDURE — 25810000003 LACTATED RINGERS PER 1000 ML: Performed by: SURGERY

## 2024-10-31 PROCEDURE — 85025 COMPLETE CBC W/AUTO DIFF WBC: CPT | Performed by: SURGERY

## 2024-10-31 PROCEDURE — 25010000002 PIPERACILLIN SOD-TAZOBACTAM PER 1 G: Performed by: SURGERY

## 2024-10-31 PROCEDURE — 97161 PT EVAL LOW COMPLEX 20 MIN: CPT

## 2024-10-31 PROCEDURE — 99024 POSTOP FOLLOW-UP VISIT: CPT | Performed by: SURGERY

## 2024-10-31 PROCEDURE — 25010000002 VANCOMYCIN 5 G RECONSTITUTED SOLUTION: Performed by: STUDENT IN AN ORGANIZED HEALTH CARE EDUCATION/TRAINING PROGRAM

## 2024-10-31 PROCEDURE — 25010000002 ENOXAPARIN PER 10 MG: Performed by: SURGERY

## 2024-10-31 PROCEDURE — 80202 ASSAY OF VANCOMYCIN: CPT | Performed by: SURGERY

## 2024-10-31 PROCEDURE — 36415 COLL VENOUS BLD VENIPUNCTURE: CPT | Performed by: SURGERY

## 2024-10-31 RX ORDER — KETOROLAC TROMETHAMINE 30 MG/ML
30 INJECTION, SOLUTION INTRAMUSCULAR; INTRAVENOUS EVERY 6 HOURS PRN
Status: DISPENSED | OUTPATIENT
Start: 2024-10-31 | End: 2024-11-02

## 2024-10-31 RX ORDER — ALUMINA, MAGNESIA, AND SIMETHICONE 2400; 2400; 240 MG/30ML; MG/30ML; MG/30ML
15 SUSPENSION ORAL EVERY 4 HOURS
Status: DISCONTINUED | OUTPATIENT
Start: 2024-10-31 | End: 2024-11-01

## 2024-10-31 RX ORDER — GABAPENTIN 300 MG/1
300 CAPSULE ORAL 3 TIMES DAILY
Status: DISCONTINUED | OUTPATIENT
Start: 2024-10-31 | End: 2024-11-04 | Stop reason: HOSPADM

## 2024-10-31 RX ORDER — FENTANYL/ROPIVACAINE/NS/PF 2-625MCG/1
15 PLASTIC BAG, INJECTION (ML) EPIDURAL
Status: COMPLETED | OUTPATIENT
Start: 2024-10-31 | End: 2024-10-31

## 2024-10-31 RX ADMIN — ALPRAZOLAM 0.5 MG: 0.25 TABLET ORAL at 04:44

## 2024-10-31 RX ADMIN — HYDROMORPHONE HYDROCHLORIDE 0.5 MG: 1 INJECTION, SOLUTION INTRAMUSCULAR; INTRAVENOUS; SUBCUTANEOUS at 03:50

## 2024-10-31 RX ADMIN — VANCOMYCIN HYDROCHLORIDE 1250 MG: 5 INJECTION, POWDER, LYOPHILIZED, FOR SOLUTION INTRAVENOUS at 11:34

## 2024-10-31 RX ADMIN — HYDROMORPHONE HYDROCHLORIDE 0.5 MG: 1 INJECTION, SOLUTION INTRAMUSCULAR; INTRAVENOUS; SUBCUTANEOUS at 09:33

## 2024-10-31 RX ADMIN — OXYCODONE HYDROCHLORIDE AND ACETAMINOPHEN 1 TABLET: 5; 325 TABLET ORAL at 18:40

## 2024-10-31 RX ADMIN — HYDROMORPHONE HYDROCHLORIDE 0.5 MG: 1 INJECTION, SOLUTION INTRAMUSCULAR; INTRAVENOUS; SUBCUTANEOUS at 06:05

## 2024-10-31 RX ADMIN — HYDROMORPHONE HYDROCHLORIDE 0.5 MG: 1 INJECTION, SOLUTION INTRAMUSCULAR; INTRAVENOUS; SUBCUTANEOUS at 15:06

## 2024-10-31 RX ADMIN — PROBIOTIC PRODUCT - TAB 1 TABLET: TAB at 08:02

## 2024-10-31 RX ADMIN — VANCOMYCIN HYDROCHLORIDE 1250 MG: 5 INJECTION, POWDER, LYOPHILIZED, FOR SOLUTION INTRAVENOUS at 22:05

## 2024-10-31 RX ADMIN — HYDROMORPHONE HYDROCHLORIDE 0.5 MG: 1 INJECTION, SOLUTION INTRAMUSCULAR; INTRAVENOUS; SUBCUTANEOUS at 01:50

## 2024-10-31 RX ADMIN — ENOXAPARIN SODIUM 40 MG: 100 INJECTION SUBCUTANEOUS at 08:04

## 2024-10-31 RX ADMIN — GABAPENTIN 300 MG: 300 CAPSULE ORAL at 15:06

## 2024-10-31 RX ADMIN — PIPERACILLIN AND TAZOBACTAM 3.38 G: 3; .375 INJECTION, POWDER, FOR SOLUTION INTRAVENOUS at 01:49

## 2024-10-31 RX ADMIN — ALPRAZOLAM 0.5 MG: 0.25 TABLET ORAL at 13:19

## 2024-10-31 RX ADMIN — POTASSIUM PHOSPHATE, MONOBASIC AND POTASSIUM PHOSPHATE, DIBASIC 15 MMOL: 224; 236 INJECTION, SOLUTION, CONCENTRATE INTRAVENOUS at 13:20

## 2024-10-31 RX ADMIN — ALUMINUM HYDROXIDE, MAGNESIUM HYDROXIDE, AND DIMETHICONE 15 ML: 400; 400; 40 SUSPENSION ORAL at 22:45

## 2024-10-31 RX ADMIN — BACLOFEN 20 MG: 10 TABLET ORAL at 20:15

## 2024-10-31 RX ADMIN — HYDROMORPHONE HYDROCHLORIDE 0.5 MG: 1 INJECTION, SOLUTION INTRAMUSCULAR; INTRAVENOUS; SUBCUTANEOUS at 17:29

## 2024-10-31 RX ADMIN — POTASSIUM PHOSPHATE, MONOBASIC AND POTASSIUM PHOSPHATE, DIBASIC 15 MMOL: 224; 236 INJECTION, SOLUTION, CONCENTRATE INTRAVENOUS at 08:04

## 2024-10-31 RX ADMIN — ALUMINUM HYDROXIDE, MAGNESIUM HYDROXIDE, AND DIMETHICONE 15 ML: 400; 400; 40 SUSPENSION ORAL at 18:39

## 2024-10-31 RX ADMIN — GABAPENTIN 300 MG: 300 CAPSULE ORAL at 20:15

## 2024-10-31 RX ADMIN — ALUMINUM HYDROXIDE, MAGNESIUM HYDROXIDE, AND DIMETHICONE 15 ML: 400; 400; 40 SUSPENSION ORAL at 02:58

## 2024-10-31 RX ADMIN — GABAPENTIN 300 MG: 300 CAPSULE ORAL at 08:02

## 2024-10-31 RX ADMIN — FUROSEMIDE 20 MG: 20 TABLET ORAL at 00:08

## 2024-10-31 RX ADMIN — OLANZAPINE 5 MG: 5 TABLET, FILM COATED ORAL at 20:15

## 2024-10-31 RX ADMIN — BUSPIRONE HYDROCHLORIDE 15 MG: 15 TABLET ORAL at 08:02

## 2024-10-31 RX ADMIN — HYDROMORPHONE HYDROCHLORIDE 0.5 MG: 1 INJECTION, SOLUTION INTRAMUSCULAR; INTRAVENOUS; SUBCUTANEOUS at 19:31

## 2024-10-31 RX ADMIN — PIPERACILLIN AND TAZOBACTAM 3.38 G: 3; .375 INJECTION, POWDER, FOR SOLUTION INTRAVENOUS at 11:27

## 2024-10-31 RX ADMIN — Medication 10 ML: at 20:18

## 2024-10-31 RX ADMIN — HYDROMORPHONE HYDROCHLORIDE 0.5 MG: 1 INJECTION, SOLUTION INTRAMUSCULAR; INTRAVENOUS; SUBCUTANEOUS at 21:31

## 2024-10-31 RX ADMIN — POTASSIUM PHOSPHATE, MONOBASIC AND POTASSIUM PHOSPHATE, DIBASIC 15 MMOL: 224; 236 INJECTION, SOLUTION, CONCENTRATE INTRAVENOUS at 17:04

## 2024-10-31 RX ADMIN — TIZANIDINE 4 MG: 4 TABLET ORAL at 11:26

## 2024-10-31 RX ADMIN — SODIUM CHLORIDE, POTASSIUM CHLORIDE, SODIUM LACTATE AND CALCIUM CHLORIDE 75 ML/HR: 600; 310; 30; 20 INJECTION, SOLUTION INTRAVENOUS at 06:34

## 2024-10-31 RX ADMIN — PANTOPRAZOLE SODIUM 40 MG: 40 TABLET, DELAYED RELEASE ORAL at 05:02

## 2024-10-31 RX ADMIN — NICOTINE 1 PATCH: 21 PATCH, EXTENDED RELEASE TRANSDERMAL at 08:03

## 2024-10-31 RX ADMIN — BUSPIRONE HYDROCHLORIDE 15 MG: 15 TABLET ORAL at 20:15

## 2024-10-31 RX ADMIN — HYDROMORPHONE HYDROCHLORIDE 0.5 MG: 1 INJECTION, SOLUTION INTRAMUSCULAR; INTRAVENOUS; SUBCUTANEOUS at 11:34

## 2024-10-31 RX ADMIN — OXYCODONE HYDROCHLORIDE AND ACETAMINOPHEN 1 TABLET: 5; 325 TABLET ORAL at 08:02

## 2024-10-31 RX ADMIN — PIPERACILLIN AND TAZOBACTAM 3.38 G: 3; .375 INJECTION, POWDER, FOR SOLUTION INTRAVENOUS at 17:53

## 2024-10-31 RX ADMIN — OXYCODONE HYDROCHLORIDE AND ACETAMINOPHEN 1 TABLET: 5; 325 TABLET ORAL at 02:56

## 2024-10-31 RX ADMIN — OXYCODONE HYDROCHLORIDE AND ACETAMINOPHEN 1 TABLET: 5; 325 TABLET ORAL at 22:45

## 2024-10-31 RX ADMIN — BACLOFEN 20 MG: 10 TABLET ORAL at 08:02

## 2024-10-31 RX ADMIN — OXYCODONE HYDROCHLORIDE AND ACETAMINOPHEN 1 TABLET: 5; 325 TABLET ORAL at 13:20

## 2024-10-31 RX ADMIN — QUETIAPINE FUMARATE 400 MG: 200 TABLET ORAL at 20:15

## 2024-10-31 NOTE — CONSULTS
Palliative care visit for pain management. Updated by primary rn regarding patients current condition. At time of visit patient is alert and oriented to person, place, time and situation. Introduced self and explained role and purpose of visit. Discussed patients current condition. Provided education on goals of care and care options included [facility] and [facility] services. Patient agreeable to referrals to both [facility] and [facility]. Spoke with NP with [facility]. Concerns with patients self reported street drug use and being unable to adequately treat patients pain due to this. [facility] will complete the referral for eligibilty of their services. Pain management for [facility] would consist of adding long acting narcotic such as fentyl patch or oxycontin as well as adding po dilaudid and methadone for additional pain management. Unsure if provider is agreeable to the addition of these medications while inpatient- but this is the recommendation of [facility] NP. Attempts made to discuss with patients spouse, no answer- left message requesting return call. Palliative care will continue to follow to support and assist.    Amalia SOLANO, RN, PN  Palliative Care

## 2024-10-31 NOTE — PROGRESS NOTES
"POST OP PROGRESS NOTE     Patient Name:  Haider Jacobs  YOB: 1970  [id number]   LOS: 2 days   1 Day Post-Op  Patient Care Team:  Ronen Ohara MD as PCP - General (Internal Medicine)  Rosey Fallon APRN as Nurse Practitioner (Urology)          Subjective     Interval History:   VSS, afebrile, reports pain in surgery site    Review of Systems:    A complete review of systems was performed and all are negative except what is documented in the HPI.       Objective     Constitutional:  well nourished, no acute distress, appears stated age /72 (BP Location: Left arm, Patient Position: Lying)   Pulse 97   Temp 97.5 °F (36.4 °C) (Oral)   Resp 18   Ht 172.7 cm (67.99\")   Wt 63.5 kg (140 lb)   SpO2 97%   BMI 21.29 kg/m²    Eyes:  anicteric sclerae, moist conjunctivae, no lid lag, PERRLA  ENMT:  oropharynx clear, moist mucous membranes, good dentition  Neck:   full ROM, trachea midline, no thyromegaly  Cardiovascular: RRR, S1 and S2 present, no MRG, heart rate 97, no pedal edema  Respiratory: lungs CTA, respirations even and unlabored  GI:  Abdomen soft, nontender, nondistended, no HSM     Skin:  warm and dry, normal turgor, no rashes. Left gluteal dressing intact  Psychiatric:  alert and oriented x3, intact judgment and insight, cooperative          Results Review:       I reviewed the patient's new clinical results including  CBC.     WBC   Date Value Ref Range Status   10/31/2024 9.42 3.40 - 10.80 10*3/mm3 Final     RBC   Date Value Ref Range Status   10/31/2024 3.99 (L) 4.14 - 5.80 10*6/mm3 Final     Hemoglobin   Date Value Ref Range Status   10/31/2024 11.5 (L) 13.0 - 17.7 g/dL Final     Hematocrit   Date Value Ref Range Status   10/31/2024 34.0 (L) 37.5 - 51.0 % Final     MCV   Date Value Ref Range Status   10/31/2024 85.2 79.0 - 97.0 fL Final     MCH   Date Value Ref Range Status   10/31/2024 28.8 26.6 - 33.0 pg Final     MCHC   Date Value Ref Range Status   10/31/2024 " 33.8 31.5 - 35.7 g/dL Final     RDW   Date Value Ref Range Status   10/31/2024 13.2 12.3 - 15.4 % Final     RDW-SD   Date Value Ref Range Status   10/31/2024 41.1 37.0 - 54.0 fl Final     MPV   Date Value Ref Range Status   10/31/2024 11.0 6.0 - 12.0 fL Final     Platelets   Date Value Ref Range Status   10/31/2024 184 140 - 450 10*3/mm3 Final     Neutrophil %   Date Value Ref Range Status   10/31/2024 69.1 42.7 - 76.0 % Final     Lymphocyte %   Date Value Ref Range Status   10/31/2024 19.4 (L) 19.6 - 45.3 % Final     Monocyte %   Date Value Ref Range Status   10/31/2024 9.6 5.0 - 12.0 % Final     Eosinophil %   Date Value Ref Range Status   10/31/2024 1.1 0.3 - 6.2 % Final     Basophil %   Date Value Ref Range Status   10/31/2024 0.5 0.0 - 1.5 % Final     Immature Grans %   Date Value Ref Range Status   10/31/2024 0.3 0.0 - 0.5 % Final     Neutrophils, Absolute   Date Value Ref Range Status   10/31/2024 6.51 1.70 - 7.00 10*3/mm3 Final     Lymphocytes, Absolute   Date Value Ref Range Status   10/31/2024 1.83 0.70 - 3.10 10*3/mm3 Final     Monocytes, Absolute   Date Value Ref Range Status   10/31/2024 0.90 0.10 - 0.90 10*3/mm3 Final     Eosinophils, Absolute   Date Value Ref Range Status   10/31/2024 0.10 0.00 - 0.40 10*3/mm3 Final     Basophils, Absolute   Date Value Ref Range Status   10/31/2024 0.05 0.00 - 0.20 10*3/mm3 Final     Immature Grans, Absolute   Date Value Ref Range Status   10/31/2024 0.03 0.00 - 0.05 10*3/mm3 Final     nRBC   Date Value Ref Range Status   10/31/2024 0.0 0.0 - 0.2 /100 WBC Final         Basic Metabolic Panel    Sodium Sodium   Date Value Ref Range Status   10/31/2024 141 136 - 145 mmol/L Final   10/30/2024 138 136 - 145 mmol/L Final   10/29/2024 137 136 - 145 mmol/L Final      Potassium Potassium   Date Value Ref Range Status   10/31/2024 3.5 3.5 - 5.2 mmol/L Final   10/30/2024 4.0 3.5 - 5.2 mmol/L Final   10/29/2024 4.3 3.5 - 5.2 mmol/L Final      Chloride Chloride   Date Value Ref  "Range Status   10/31/2024 106 98 - 107 mmol/L Final   10/30/2024 102 98 - 107 mmol/L Final   10/29/2024 99 98 - 107 mmol/L Final      Bicarbonate No results found for: \"PLASMABICARB\"   BUN BUN   Date Value Ref Range Status   10/31/2024 13 6 - 20 mg/dL Final   10/30/2024 14 6 - 20 mg/dL Final   10/29/2024 16 6 - 20 mg/dL Final      Creatinine Creatinine   Date Value Ref Range Status   10/31/2024 0.78 0.76 - 1.27 mg/dL Final   10/30/2024 0.72 (L) 0.76 - 1.27 mg/dL Final   10/29/2024 0.84 0.76 - 1.27 mg/dL Final      Calcium Calcium   Date Value Ref Range Status   10/31/2024 8.6 8.6 - 10.5 mg/dL Final   10/30/2024 8.8 8.6 - 10.5 mg/dL Final   10/29/2024 8.9 8.6 - 10.5 mg/dL Final      Glucose      No components found for: \"GLUCOSE.*\"       Lab Results   Component Value Date    GLUCOSE 149 (H) 10/31/2024    BUN 13 10/31/2024    CREATININE 0.78 10/31/2024     10/31/2024    K 3.5 10/31/2024     10/31/2024    CALCIUM 8.6 10/31/2024    PROTEINTOT 7.1 10/29/2024    ALBUMIN 3.4 (L) 10/31/2024    ALT 8 10/29/2024    AST 13 10/29/2024    ALKPHOS 88 10/29/2024    BILITOT 0.2 10/29/2024    GLOB 3.5 10/29/2024    AGRATIO 1.0 10/29/2024    BCR 16.7 10/31/2024    ANIONGAP 9.0 10/31/2024    EGFR 105.3 10/31/2024       IMAGING:        Medications:    Current Facility-Administered Medications:     acetaminophen (TYLENOL) tablet 650 mg, 650 mg, Oral, Q4H PRN **OR** acetaminophen (TYLENOL) 160 MG/5ML oral solution 650 mg, 650 mg, Oral, Q4H PRN **OR** acetaminophen (TYLENOL) suppository 650 mg, 650 mg, Rectal, Q4H PRN, Saeid Quesada MD    ALPRAZolam (XANAX) tablet 0.5 mg, 0.5 mg, Oral, Q8H PRN, Saeid Quesada MD, 0.5 mg at 10/31/24 0444    aluminum-magnesium hydroxide-simethicone (MAALOX MAX) 400-400-40 MG/5ML suspension 15 mL, 15 mL, Oral, Q6H PRN, Saeid Quesada MD, 15 mL at 10/31/24 0258    baclofen (LIORESAL) tablet 20 mg, 20 mg, Oral, BID, Saeid Quesada MD, 20 mg at 10/31/24 0802    " sennosides-docusate (PERICOLACE) 8.6-50 MG per tablet 2 tablet, 2 tablet, Oral, BID PRN **AND** polyethylene glycol (MIRALAX) packet 17 g, 17 g, Oral, Daily PRN **AND** bisacodyl (DULCOLAX) EC tablet 5 mg, 5 mg, Oral, Daily PRN **AND** bisacodyl (DULCOLAX) suppository 10 mg, 10 mg, Rectal, Daily PRN, Saeid Quesada MD    busPIRone (BUSPAR) tablet 15 mg, 15 mg, Oral, Q12H, Saeid Quesada MD, 15 mg at 10/31/24 0802    Enoxaparin Sodium (LOVENOX) syringe 40 mg, 40 mg, Subcutaneous, Daily, Saeid Quesada MD, 40 mg at 10/31/24 0804    [Held by provider] furosemide (LASIX) tablet 20 mg, 20 mg, Oral, Q24H, Saeid Quesada MD, 20 mg at 10/31/24 0008    gabapentin (NEURONTIN) capsule 300 mg, 300 mg, Oral, TID, Anthony Castro MD, 300 mg at 10/31/24 0802    HYDROmorphone (DILAUDID) injection 0.5 mg, 0.5 mg, Intravenous, Q2H PRN, 0.5 mg at 10/31/24 0933 **AND** naloxone (NARCAN) injection 0.4 mg, 0.4 mg, Intravenous, Q5 Min PRN, Saeid Quesada MD    hydrOXYzine (ATARAX) tablet 50 mg, 50 mg, Oral, Daily PRN, Saeid Quesada MD, 50 mg at 10/30/24 1836    ketorolac (TORADOL) injection 30 mg, 30 mg, Intravenous, Q6H PRN, Anthony Castro MD    Nerve Block solution 30.7 mL, 30.7 mL, Injection, Once, Saeid Quesada MD    nicotine (NICODERM CQ) 21 MG/24HR patch 1 patch, 1 patch, Transdermal, Q24H, Anthony Castro MD, 1 patch at 10/31/24 0803    OLANZapine (zyPREXA) tablet 5 mg, 5 mg, Oral, Nightly, Anthony Castro MD, 5 mg at 10/30/24 2001    ondansetron (ZOFRAN) injection 4 mg, 4 mg, Intravenous, Q6H PRN, Saeid Quesada MD    oxyCODONE-acetaminophen (PERCOCET) 5-325 MG per tablet 1 tablet, 1 tablet, Oral, Q4H PRN, Anthony Castro MD, 1 tablet at 10/31/24 0802    pantoprazole (PROTONIX) EC tablet 40 mg, 40 mg, Oral, Q AM, Saeid Quesada MD, 40 mg at 10/31/24 0502    Pharmacy to dose vancomycin, , Does not apply, Continuous PRN, Sangita,  Saeid Vazquez MD    piperacillin-tazobactam (ZOSYN) IVPB 3.375 g IVPB in 100 mL NS (VTB), 3.375 g, Intravenous, Q8H, Saeid Quesada MD, 3.375 g at 10/31/24 0149    potassium phosphate 15 mmol in 0.9% normal saline 250 mL IVPB, 15 mmol, Intravenous, Q3H, Anthony Castro MD, 15 mmol at 10/31/24 0804    QUEtiapine (SEROquel) tablet 400 mg, 400 mg, Oral, Nightly, Saeid Quesada MD, 400 mg at 10/30/24 2003    Mimi-Bid Probiotic tablet 1 tablet, 1 tablet, Oral, Daily, Saeid Quesada MD, 1 tablet at 10/31/24 0802    sodium chloride 0.9 % flush 10 mL, 10 mL, Intravenous, PRN, Saeid Quesada MD    tiZANidine (ZANAFLEX) tablet 4 mg, 4 mg, Oral, Q12H PRN, Saeid Quesada MD    vancomycin 1250 mg/250 mL 0.9% NS IVPB ([facility]), 1,250 mg, Intravenous, Q12H, Anthony Castro MD    Assessment & Plan       Gluteal abscess    S/p Incision and drainage abscess with debridement of soft tissue left buttock wound      Cont antibiotic   Wound nurse consult   Cont localized wound care        Electronically signed by RAMANA Lechuga, 10/31/24, 9:49 AM EDT.

## 2024-10-31 NOTE — PROGRESS NOTES
"[facility] Clinical Pharmacy Services: Vancomycin Monitoring Note  Haider Jacosb is a 53 y.o. male who is on pharmacy to dose vancomycin for Skin and Soft Tissue - Gluteal abscess, s/p ID [id number].     Previous Vancomycin Dose: 1000 mg IV every 12 hours  Imaging Reviewed?: Yes  Updated Cultures and Sensitivities:   10/30 Wound Cx, buttock: GNB, GPC - pending    Component Value   Wound Culture Light growth (2+) Gram Negative Bacilli Abnormal  P   Gram Stain Moderate (3+) WBCs seen P    Many (4+) Gram negative bacilli P    Many (4+) Gram positive cocci in pairs, chains and clusters         Vitals/Labs  Ht: 172.7 cm (67.99\"); Wt: 63.5 kg (140 lb)   Temp (24hrs), Av.8 °F (36.6 °C), Min:97.5 °F (36.4 °C), Max:98.2 °F (36.8 °C)   Estimated Creatinine Clearance: 96.1 mL/min (by C-G formula based on SCr of 0.78 mg/dL).     Results from last 7 days   Lab Units 10/31/24  0533 10/30/24  0539 10/29/24  1805   VANCOMYCIN RM mcg/mL 9.80  --   --    CREATININE mg/dL 0.78 0.72* 0.84   WBC 10*3/mm3 9.42 6.10 8.01     Assessment/Plan      Random level this am indicates dose increase is needed to better met AUC. New Vancomycin Dose:  1250 mg IV every 12 hours; which provides the following predicted parameters:  AUC24,ss: 491 mg/L.hr  Probability of AUC24 > 400: 89 %  Ctrough,ss: 12.5 mg/L  Probability of Ctrough,ss > 20: 3 %  Probability of nephrotoxicity (Lodise JOVANNI ): 8 %  Next Vanc Random planned for  or 11/3 if vanc extended - stops   Pending cultures. We will continue to monitor patient changes and renal function     Thank you for involving pharmacy in this patient's care. Please contact pharmacy with any questions or concerns.    Yumiko Kemp [facility]  Clinical Pharmacist      "

## 2024-10-31 NOTE — THERAPY EVALUATION
Patient Name: Haider Jacobs  : 1970    MRN: [id number]                              Today's Date: 10/31/2024       Admit Date: 10/29/2024    Visit Dx:     ICD-10-CM ICD-9-CM   1. Skin ulcer of sacrum, unspecified ulcer stage  L98.429 707.8   2. Gluteal abscess  L02.31 682.5     Patient Active Problem List   Diagnosis    Urinary retention    Gross hematuria    Gluteal abscess     Past Medical History:   Diagnosis Date    Anxiety     Chronic tetraplegia     Contracture of hand     LEFT HAND TIGHT HAND CONTRACTURE. RIGHT HAD CAN OPEN 30%.    Drug abuse     Hematuria     Neurogenic bladder     Spinal cord injury at C1-C4 level without spinal injury     Urinary retention      Past Surgical History:   Procedure Laterality Date    ANKLE SURGERY      CERVICAL FUSION      C2-C7    DEBRIDEMENT OF ISCHIAL ULCER/BUTTOCKS WOUND Left 10/30/2024    Procedure: DEBRIDEMENT OF ISCHIAL ULCER/BUTTOCKS WOUND: clean and remove unhealthy tissue from the wound using surgical instruments;  Surgeon: Saeid Quesada MD;  Location: [facility];  Service: General;  Laterality: Left;    FRACTURE SURGERY      ARM    URETEROSCOPY LASER LITHOTRIPSY WITH STENT INSERTION Bilateral 2023    Procedure: CYSTOSCOPY BILATERAL RETROGRADE PYELOGRAM BLADDER STONE EXTRACTION;  Surgeon: Jennifer Bowman MD;  Location: [facility] OR;  Service: Urology;  Laterality: Bilateral;      General Information       Row Name 10/31/24 0957          OT Time and Intention    Subjective Information fatigue;pain  -PG     Document Type evaluation  -PG     Mode of Treatment individual therapy;occupational therapy  -PG     Patient Effort good  -PG     Symptoms Noted During/After Treatment increased pain;fatigue  -PG       Row Name 10/31/24 0957          General Information    Patient Profile Reviewed yes  Lives with wife.  Receives assistance with his self-care activities but does use a rolling walker throughout the house.  -PG     Prior Level of  Function mod assist:;ADL's  -PG     Existing Precautions/Restrictions fall  -PG     Barriers to Rehab none identified  -PG       Row Name 10/31/24 0957          Occupational Profile    Reason for Services/Referral (Occupational Profile) Patient is a 53-year-old male admitted for sacral ulcer/gluteal abscess.  Patient is being evaluated by Occupational Therapy due to recent decline in ADL function.  No previous OT services identified  -PG       Row Name 10/31/24 0957          Living Environment    People in Home spouse  -PG       Row Name 10/31/24 0957          Cognition    Orientation Status (Cognition) oriented x 3  -PG       Row Name 10/31/24 0957          Safety Issues/Impairments Affecting Functional Mobility    Impairments Affecting Function (Mobility) balance;pain;strength;endurance/activity tolerance  -PG               User Key  (r) = Recorded By, (t) = Taken By, (c) = Cosigned By      Initials Name Provider Type    PG aMx Mcdermott, OT Occupational Therapist                     Mobility/ADL's       Row Name 10/31/24 0959          Bed Mobility    Bed Mobility supine-sit  -PG     Supine-Sit [place] (Bed Mobility) moderate assist (50% patient effort)  -PG       Row Name 10/31/24 0959          Transfers    Transfers sit-stand transfer;stand-sit transfer  -PG       Row Name 10/31/24 0959          Sit-Stand Transfer    Sit-Stand [place] (Transfers) contact guard;minimum assist (75% patient effort);verbal cues  -PG     Assistive Device (Sit-Stand Transfers) walker, front-wheeled  -PG     Comment, (Sit-Stand Transfer) CGA to hallway and back to bed using rwx  -PG       Row Name 10/31/24 0959          Stand-Sit Transfer    Stand-Sit [place] (Transfers) contact guard;minimum assist (75% patient effort);verbal cues  -PG     Assistive Device (Stand-Sit Transfers) walker, front-wheeled  -PG       Row Name 10/31/24 0959          Activities of Daily Living    BADL Assessment/Intervention bathing;upper body  dressing;lower body dressing;grooming;toileting  -PG       Row Name 10/31/24 0959          Bathing Assessment/Intervention    [place] Level (Bathing) bathing skills;moderate assist (50% patient effort)  -PG       Row Name 10/31/24 0959          Upper Body Dressing Assessment/Training    [place] Level (Upper Body Dressing) upper body dressing skills;minimum assist (75% patient effort)  -PG       Row Name 10/31/24 0959          Lower Body Dressing Assessment/Training    [place] Level (Lower Body Dressing) lower body dressing skills;dependent (less than 25% patient effort)  -PG       Row Name 10/31/24 0959          Grooming Assessment/Training    [place] Level (Grooming) grooming skills;set up  -PG       Row Name 10/31/24 0959          Toileting Assessment/Training    [place] Level (Toileting) toileting skills;maximum assist (25% patient effort)  -PG               User Key  (r) = Recorded By, (t) = Taken By, (c) = Cosigned By      Initials Name Provider Type    PG Max Mcdermott OT Occupational Therapist                   Obj/Interventions       Row Name 10/31/24 1000          Sensory Assessment (Somatosensory)    Sensory Assessment (Somatosensory) unable/difficult to assess  -PG       Row Name 10/31/24 1000          Vision Assessment/Intervention    Visual Impairment/Limitations unable/difficult to assess  -PG       Row Name 10/31/24 1000          Range of Motion Comprehensive    General Range of Motion upper extremity range of motion deficits identified  -PG     Comment, General Range of Motion LUE hand contracted, limited shld ROM  -PG       Row Name 10/31/24 1000          Motor Skills    Motor Skills coordination;functional endurance  -PG     Coordination WFL  -PG     Functional Endurance fair  -PG               User Key  (r) = Recorded By, (t) = Taken By, (c) = Cosigned By      Initials Name Provider Type    Max Orosco OT Occupational Therapist                   Goals/Plan       Row  Name 10/31/24 1001          Transfer Goal 1 (OT)    Activity/Assistive Device (Transfer Goal 1, OT) transfers, all  -PG     [place] Level/Cues Needed (Transfer Goal 1, OT) modified independence  -PG     Time Frame (Transfer Goal 1, OT) long term goal (LTG);10 days  -PG       Row Name 10/31/24 1001          Bathing Goal 1 (OT)    Activity/Device (Bathing Goal 1, OT) bathing skills, all  -PG     [place] Level/Cues Needed (Bathing Goal 1, OT) modified independence  -PG     Time Frame (Bathing Goal 1, OT) long term goal (LTG);10 days  -PG       Row Name 10/31/24 1001          Dressing Goal 1 (OT)    Activity/Device (Dressing Goal 1, OT) dressing skills, all  -PG     [place]/Cues Needed (Dressing Goal 1, OT) modified independence  -PG     Time Frame (Dressing Goal 1, OT) long term goal (LTG);10 days  -PG       Row Name 10/31/24 1001          Toileting Goal 1 (OT)    Activity/Device (Toileting Goal 1, OT) toileting skills, all  -PG     [place] Level/Cues Needed (Toileting Goal 1, OT) modified independence  -PG     Time Frame (Toileting Goal 1, OT) long term goal (LTG);10 days  -PG       Row Name 10/31/24 1001          Grooming Goal 1 (OT)    Activity/Device (Grooming Goal 1, OT) grooming skills, all  -PG     [place] (Grooming Goal 1, OT) modified independence  -PG     Time Frame (Grooming Goal 1, OT) long term goal (LTG);10 days  -PG       Row Name 10/31/24 1001          Problem Specific Goal 1 (OT)    Problem Specific Goal 1 (OT) Patient will improve activity tolerance to good minus to support independence and engagement with ADL activities  -PG     Time Frame (Problem Specific Goal 1, OT) long term goal (LTG);10 days  -PG       Row Name 10/31/24 1001          Therapy Assessment/Plan (OT)    Planned Therapy Interventions (OT) activity tolerance training;BADL retraining;strengthening exercise;transfer/mobility retraining;patient/caregiver education/training;occupation/activity based interventions   -PG               User Key  (r) = Recorded By, (t) = Taken By, (c) = Cosigned By      Initials Name Provider Type    PG Max Mcdermott OT Occupational Therapist                   Clinical Impression       Row Name 10/31/24 1001          Pain Assessment    Pretreatment Pain Rating 8/10  -PG     Posttreatment Pain Rating 8/10  -PG     Pain Location buttock  -PG     Pain Management Interventions nursing notified  -PG       Row Name 10/31/24 1001          Plan of Care Review    Plan of Care Reviewed With patient  -PG     Progress no change  -PG     Outcome Evaluation Patient presents with limitations affecting strength, activity tolerance, and balance impacting patient's ability to return home safely and independently.  The skills of a therapist will be required to safely and effectively implement the following treatment plan to restore maximal level of function  -PG       Row Name 10/31/24 1001          Therapy Assessment/Plan (OT)    Patient/Family Therapy Goal Statement (OT) Get stronger and return home with wife  -PG     Rehab Potential (OT) good  -PG     Criteria for Skilled Therapeutic Interventions Met (OT) yes;meets criteria;skilled treatment is necessary  -PG     Therapy Frequency (OT) 5 times/wk  -PG       Row Name 10/31/24 1001          Therapy Plan Review/Discharge Plan (OT)    Anticipated Discharge Disposition (OT) home with home health;home with assist  -PG               User Key  (r) = Recorded By, (t) = Taken By, (c) = Cosigned By      Initials Name Provider Type    PG Max Mcdermott OT Occupational Therapist                   Outcome Measures       Row Name 10/31/24 1003          How much help from another is currently needed...    Putting on and taking off regular lower body clothing? 1  -PG     Bathing (including washing, rinsing, and drying) 2  -PG     Toileting (which includes using toilet bed pan or urinal) 1  -PG     Putting on and taking off regular upper body clothing 3  -PG     Taking care of  personal grooming (such as brushing teeth) 3  -PG     Eating meals 4  -PG     AM-PAC 6 Clicks Score (OT) 14  -PG       Row Name 10/30/24 2300          How much help from another person do you currently need...    Turning from your back to your side while in flat bed without using bedrails? 3  -AH     Moving from lying on back to sitting on the side of a flat bed without bedrails? 3  -AH     Moving to and from a bed to a chair (including a wheelchair)? 2  -AH     Standing up from a chair using your arms (e.g., wheelchair, bedside chair)? 2  -AH     Climbing 3-5 steps with a railing? 2  -AH     To walk in hospital room? 2  -AH     AM-PAC 6 Clicks Score (PT) 14  -AH       Row Name 10/31/24 1003          Functional Assessment    Outcome Measure Options AM-PAC 6 Clicks Daily Activity (OT);Optimal Instrument  -PG       Row Name 10/31/24 1003          Optimal Instrument    Optimal Instrument Optimal - 3  -PG     Bending/Stooping 3  -PG     Standing 2  -PG     Reaching 2  -PG     From the list, choose the 3 activities you would most like to be able to do without any difficulty Bending/stooping;Standing;Reaching  -PG     Total Score Optimal - 3 7  -PG               User Key  (r) = Recorded By, (t) = Taken By, (c) = Cosigned By      Initials Name Provider Type    PG Max Mcdermott OT Occupational Therapist    Graciela Villa, RN Registered Nurse                    Occupational Therapy Education       Title: PT OT SLP Therapies (Done)       Topic: Occupational Therapy (Done)       Point: ADL training (Done)       Description:   Instruct learner(s) on proper safety adaptation and remediation techniques during self care or transfers.   Instruct in proper use of assistive devices.                  Learning Progress Summary            Patient Acceptance, E,D, DU,VU by PG at 10/31/2024 1007                      Point: Home exercise program (Done)       Description:   Instruct learner(s) on appropriate technique for  monitoring, assisting and/or progressing therapeutic exercises/activities.                  Learning Progress Summary            Patient Acceptance, E,D, DU,VU by PG at 10/31/2024 1007                      Point: Precautions (Done)       Description:   Instruct learner(s) on prescribed precautions during self-care and functional transfers.                  Learning Progress Summary            Patient Acceptance, E,D, DU,VU by PG at 10/31/2024 1007                      Point: Body mechanics (Done)       Description:   Instruct learner(s) on proper positioning and spine alignment during self-care, functional mobility activities and/or exercises.                  Learning Progress Summary            Patient Acceptance, E,D, DU,VU by PG at 10/31/2024 1007                                      User Key       Initials Effective Dates Name Provider Type Discipline    PG 06/16/21 -  Max Mcdermott, OT Occupational Therapist OT                  OT Recommendation and Plan  Planned Therapy Interventions (OT): activity tolerance training, BADL retraining, strengthening exercise, transfer/mobility retraining, patient/caregiver education/training, occupation/activity based interventions  Therapy Frequency (OT): 5 times/wk  Plan of Care Review  Plan of Care Reviewed With: patient  Progress: no change  Outcome Evaluation: Patient presents with limitations affecting strength, activity tolerance, and balance impacting patient's ability to return home safely and independently.  The skills of a therapist will be required to safely and effectively implement the following treatment plan to restore maximal level of function     Time Calculation:   Evaluation Complexity (OT)  Review Occupational Profile/Medical/Therapy History Complexity: brief/low complexity  Assessment, Occupational Performance/Identification of Deficit Complexity: 3-5 performance deficits  Clinical Decision Making Complexity (OT): problem focused assessment/low  complexity  Overall Complexity of Evaluation (OT): low complexity     Time Calculation- OT       Row Name 10/31/24 1015             Time Calculation- OT    OT Received On 10/31/24  -PG      OT Goal Re-Cert Due Date 11/09/24  -PG         Untimed Charges    OT Eval/Re-eval Minutes 35  -PG         Total Minutes    Untimed Charges Total Minutes 35  -PG       Total Minutes 35  -PG                User Key  (r) = Recorded By, (t) = Taken By, (c) = Cosigned By      Initials Name Provider Type    PG Max Mcdermott OT Occupational Therapist                  Therapy Charges for Today       Code Description Service Date Service Provider Modifiers Qty    [id number] HC OT EVAL LOW COMPLEXITY 3 10/31/2024 Max Mcdermott OT GO 1                 Max Mcdermott OT  10/31/2024

## 2024-10-31 NOTE — PLAN OF CARE
Goal Outcome Evaluation:              Outcome Evaluation: Pt complains of severe pain, given pain meds as ordered. Extravasation policy followed for right upper arm. Critical phos of 0.9 reported to MD. Consulted palliative care for pain management during shift. Pt anxious and given PRN xanax and zanaflex for muscle cramps. Spoke to wife on phone. Wound care completed. Continue plan of care.

## 2024-10-31 NOTE — PLAN OF CARE
Goal Outcome Evaluation:  Plan of Care Reviewed With: patient               Outcome Evaluation: Pt AAOx4. VSS. Wound and skin care completed. Prn pain and anxiety medication  given as often as  ordered.  Pt  c./o of pain medication not effective, MD notified via phone additional one time dose of 1 mg dilaudid given. Patient wanting to go home, educate patient about risk AMA. Pt wife refused to  patient and not answering phone pt decided to wait in the morning, but he has episodes of shaking bed and using profanity. Pt's anxiety is worsening, as he is worried about wife  not  answering phone.

## 2024-10-31 NOTE — SIGNIFICANT NOTE
Reviewed patients chart and discussed with surgeon. No evidence of fistula identified; okay to proceed with wound vac placement per surgeon request. Sana Khan RN

## 2024-10-31 NOTE — THERAPY EVALUATION
Acute Care - Physical Therapy Initial Evaluation   [place]     Patient Name: Haider Jacobs  : 1970  MRN: [id number]  Today's Date: 10/31/2024   Onset of Illness/Injury or Date of Surgery: (P) 10/29/24  Visit Dx:     ICD-10-CM ICD-9-CM   1. Skin ulcer of sacrum, unspecified ulcer stage  L98.429 707.8   2. Gluteal abscess  L02.31 682.5   3. Difficulty in walking  R26.2 719.7     Patient Active Problem List   Diagnosis    Urinary retention    Gross hematuria    Gluteal abscess     Past Medical History:   Diagnosis Date    Anxiety     Chronic tetraplegia     Contracture of hand     LEFT HAND TIGHT HAND CONTRACTURE. RIGHT HAD CAN OPEN 30%.    Drug abuse     Hematuria     Neurogenic bladder     Spinal cord injury at C1-C4 level without spinal injury     Urinary retention      Past Surgical History:   Procedure Laterality Date    ANKLE SURGERY      CERVICAL FUSION      C2-C7    DEBRIDEMENT OF ISCHIAL ULCER/BUTTOCKS WOUND Left 10/30/2024    Procedure: DEBRIDEMENT OF ISCHIAL ULCER/BUTTOCKS WOUND: clean and remove unhealthy tissue from the wound using surgical instruments;  Surgeon: Saeid Quesada MD;  Location: [facility];  Service: General;  Laterality: Left;    FRACTURE SURGERY      ARM    URETEROSCOPY LASER LITHOTRIPSY WITH STENT INSERTION Bilateral 2023    Procedure: CYSTOSCOPY BILATERAL RETROGRADE PYELOGRAM BLADDER STONE EXTRACTION;  Surgeon: Jennifer Bowman MD;  Location: [facility];  Service: Urology;  Laterality: Bilateral;     PT Assessment (Last 12 Hours)       PT Evaluation and Treatment       Row Name 10/31/24 1204          Physical Therapy Time and Intention    Subjective Information no complaints (P)   -EW     Document Type evaluation (P)   -EW     Mode of Treatment individual therapy;physical therapy (P)   -EW     Total Minutes, Physical Therapy 45 (P)   -EW     Patient Effort good (P)   -EW     Symptoms Noted During/After Treatment none (P)   -EW       Row Name 10/31/24 1531           General Information    Patient Profile Reviewed yes (P)   -EW     Onset of Illness/Injury or Date of Surgery 10/29/24 (P)   -EW     Patient Observations alert;cooperative;agree to therapy (P)   -EW     Prior Level of Function mod assist: (P)   Pt requires Mod-A from wife with some ADLs  -EW     Equipment Currently Used at Home walker, standard;wheelchair (P)   -EW     Benefits Reviewed patient:;increase independence;improve function;increase strength;decrease pain;increase balance (P)   -EW     Barriers to Rehab none identified (P)   -EW       Row Name 10/31/24 1206          Living Environment    Current Living Arrangements home (P)   -EW     People in Home spouse (P)   -EW     Primary Care Provided by self;spouse/significant other (P)   -EW       Row Name 10/31/24 1206          Home Use of Assistive/Adaptive Equipment    Equipment Currently Used at Home walker, standard;wheelchair (P)   -EW       Row Name 10/31/24 1206          Range of Motion (ROM)    Range of Motion bilateral lower extremities (P)   B LE: Limited knee flexion/extension bilaterally due to contractures  -EW       Row Name 10/31/24 1206          Strength (Manual Muscle Testing)    Strength (Manual Muscle Testing) bilateral lower extremities (P)   B LE: 4/5 all MMT, spastic with all  -EW       Row Name 10/31/24 1206          Bed Mobility    Bed Mobility supine-sit;sit-supine (P)   -EW     Supine-Sit [place] (Bed Mobility) moderate assist (50% patient effort) (P)   -EW     Sit-Supine [place] (Bed Mobility) moderate assist (50% patient effort) (P)   -EW     Bed Mobility, Safety Issues decreased use of arms for pushing/pulling;decreased use of legs for bridging/pushing (P)   -EW       Row Name 10/31/24 1206          Transfers    Transfers sit-stand transfer;stand-sit transfer (P)   -EW       Row Name 10/31/24 1206          Sit-Stand Transfer    Sit-Stand [place] (Transfers) minimum assist (75% patient effort);1 person assist (P)    -EW     Assistive Device (Sit-Stand Transfers) walker, standard (P)   -EW       Row Name 10/31/24 1206          Stand-Sit Transfer    Stand-Sit [place] (Transfers) minimum assist (75% patient effort) (P)   -EW     Assistive Device (Stand-Sit Transfers) walker, standard (P)   -EW       Row Name 10/31/24 1206          Gait/Stairs (Locomotion)    Gait/Stairs Locomotion gait/ambulation assistive device (P)   -EW     [place] Level (Gait) contact guard (P)   -EW     Assistive Device (Gait) walker, standard (P)   -EW     Patient was able to Ambulate yes (P)   -EW     Distance in Feet (Gait) 20 (P)   -EW     Pattern (Gait) step-to (P)   -EW     Deviations/Abnormal Patterns (Gait) base of support, narrow;yovanny decreased;gait speed decreased;stride length decreased;other (see comments) (P)   Pt is spastic and has bilateral knee flexion contractures  -EW     Bilateral Gait Deviations forward flexed posture (P)   -EW       Row Name 10/31/24 1206          Safety Issues/Impairments Affecting Functional Mobility    Impairments Affecting Function (Mobility) balance;pain;strength;endurance/activity tolerance (P)   -EW       Row Name 10/31/24 1206          Balance    Balance Assessment sitting static balance;standing dynamic balance (P)   -EW     Static Sitting Balance modified independence (P)   -EW     Position, Sitting Balance supported;sitting edge of bed (P)   Able to hold self up with bed rails or support from UE  -EW     Dynamic Standing Balance contact guard (P)   -EW     Position/Device Used, Standing Balance supported;walker, standard (P)   -EW       Row Name             Wound 05/21/24 1004 Left lateral ankle    Wound - Properties Group Placement Date: 05/21/24  -KOLTON Placement Time: 1004  -KOLTON Side: Left  -KOLTON Orientation: lateral  -KOLTON Location: ankle  -KOLTON Present on Original Admission: Y  -KOLTON    Retired Wound - Properties Group Placement Date: 05/21/24  -KOLTON Placement Time: 1004  -KOLTON Present on Original Admission:  Y  -KOLTON Side: Left  -KOLTON Orientation: lateral  -KOLTON Location: ankle  -KOLTON    Retired Wound - Properties Group Placement Date: 05/21/24  -KOLTON Placement Time: 1004  -KOLTON Present on Original Admission: Y  -KOLTON Side: Left  -KOLTON Orientation: lateral  -KOLTON Location: ankle  -KOLTON    Retired Wound - Properties Group Date first assessed: 05/21/24  -KOLTON Time first assessed: 1004  -KOLTON Present on Original Admission: Y  -KOLTON Side: Left  -KOLTON Location: ankle  -KOLTON      Row Name             Wound 05/21/24 1005 Left lateral foot    Wound - Properties Group Placement Date: 05/21/24  -KOLTON Placement Time: 1005  -KOLTON Side: Left  -KOLTON Orientation: lateral  -KOLTON Location: foot  -KOLTON Present on Original Admission: Y  -KOLTON    Retired Wound - Properties Group Placement Date: 05/21/24  -KOLTON Placement Time: 1005  -KOLTON Present on Original Admission: Y  -KOLTON Side: Left  -KOLTON Orientation: lateral  -KOLTON Location: foot  -KOLTON    Retired Wound - Properties Group Placement Date: 05/21/24  -KOLTON Placement Time: 1005  -KOLTON Present on Original Admission: Y  -KOLTON Side: Left  -KOLTON Orientation: lateral  -KOLTON Location: foot  -KOLTON    Retired Wound - Properties Group Date first assessed: 05/21/24  -KOLTON Time first assessed: 1005  -KOLTON Present on Original Admission: Y  -KOLTON Side: Left  -KOLTON Location: foot  -KOLTON      Row Name             Wound 07/30/24 1336 gluteal    Wound - Properties Group Placement Date: 07/30/24  -KH Placement Time: 1336  -KH Location: gluteal  -KH    Retired Wound - Properties Group Placement Date: 07/30/24  -KH Placement Time: 1336 -KH Location: gluteal  -KH    Retired Wound - Properties Group Placement Date: 07/30/24  -KH Placement Time: 1336  -KH Location: gluteal  -KH    Retired Wound - Properties Group Date first assessed: 07/30/24  -KH Time first assessed: 1336  -KH Location: gluteal  -KH      Row Name             Wound 10/10/24 1738 Right gluteal Pressure Injury    Wound - Properties Group Placement Date: 10/10/24  -AM Placement Time: 1738  -AM Side: Right  -AM  Location: gluteal  -AM Primary Wound Type: Pressure inj  -AM    Retired Wound - Properties Group Placement Date: 10/10/24  -AM Placement Time: 1738  -AM Side: Right  -AM Location: gluteal  -AM Primary Wound Type: Pressure inj  -AM    Retired Wound - Properties Group Placement Date: 10/10/24  -AM Placement Time: 1738  -AM Side: Right  -AM Location: gluteal  -AM Primary Wound Type: Pressure inj  -AM    Retired Wound - Properties Group Date first assessed: 10/10/24  -AM Time first assessed: 1738  -AM Side: Right  -AM Location: gluteal  -AM Primary Wound Type: Pressure inj  -AM      Row Name             Wound 10/30/24 1122 Left gluteal    Wound - Properties Group Placement Date: 10/30/24  -KK Placement Time: 1122 -KK Side: Left  -KK Location: gluteal  -KK Primary Wound Type: Incision  -KK Present on Original Admission: N  -KK Additional Comments: Debridement  -KK    Retired Wound - Properties Group Placement Date: 10/30/24  -KK Placement Time: 1122 -KK Present on Original Admission: N  -KK Side: Left  -KK Location: gluteal  -KK Primary Wound Type: Incision  -KK Additional Comments: Debridement  -KK    Retired Wound - Properties Group Placement Date: 10/30/24  -KK Placement Time: 1122 -KK Present on Original Admission: N  -KK Side: Left  -KK Location: gluteal  -KK Primary Wound Type: Incision  -KK Additional Comments: Debridement  -KK    Retired Wound - Properties Group Date first assessed: 10/30/24  -KK Time first assessed: 1122 -KK Present on Original Admission: N  -KK Side: Left  -KK Location: gluteal  -KK Primary Wound Type: Incision  -KK Additional Comments: Debridement  -KK      Row Name 10/31/24 1206          Plan of Care Review    Plan of Care Reviewed With patient (P)   -EW     Outcome Evaluation Pt presents with deficits in B LE strength and ROM as well as issues with general ambulation, transfers, endurance, and activity tolerance. Pt has bilateral flexion contractures and significant spasticity. Skilled  PT intervention is reccommended to address noted deficits. Recommend d/c to home with outpatient therapy services. (P)   -EW       Row Name 10/31/24 1206          Therapy Assessment/Plan (PT)    Rehab Potential (PT) fair (P)   -EW     Criteria for Skilled Interventions Met (PT) yes;skilled treatment is necessary (P)   -EW     Therapy Frequency (PT) daily (P)   -EW     Predicted Duration of Therapy Intervention (PT) 10 days (P)   -EW     Problem List (PT) problems related to;balance;coordination;mobility;range of motion (ROM);strength;pain (P)   -EW     Activity Limitations Related to Problem List (PT) unable to ambulate safely;unable to transfer safely (P)   -EW       Row Name 10/31/24 1206          Therapy Plan Review/Discharge Plan (PT)    Therapy Plan Review (PT) evaluation/treatment results reviewed;participants included;patient (P)   -EW       Row Name 10/31/24 1206          Physical Therapy Goals    Transfer Goal Selection (PT) transfer, PT goal 1 (P)   -EW     Gait Training Goal Selection (PT) gait training, PT goal 1 (P)   -EW       Row Name 10/31/24 1206          Transfer Goal 1 (PT)    Activity/Assistive Device (Transfer Goal 1, PT) transfers, all (P)   -EW     [place] Level/Cues Needed (Transfer Goal 1, PT) supervision required (P)   -EW     Time Frame (Transfer Goal 1, PT) long term goal (LTG);10 days (P)   -EW       Row Name 10/31/24 1206          Gait Training Goal 1 (PT)    Activity/Assistive Device (Gait Training Goal 1, PT) gait (walking locomotion) (P)   -EW     [place] Level (Gait Training Goal 1, PT) supervision required (P)   -EW     Distance (Gait Training Goal 1, PT) 100 ft (P)   -EW     Time Frame (Gait Training Goal 1, PT) long term goal (LTG);10 days (P)   -EW               User Key  (r) = Recorded By, (t) = Taken By, (c) = Cosigned By      Initials Name Provider Type    Mery Oden, RN Registered Nurse    Idalia Marks RN Registered Nurse    Nuria Cuba RN  Registered Nurse    Jacqui Shen, RN Registered Nurse    Matt Osborn, PT Student PT Student                    Physical Therapy Education       Title: PT OT SLP Therapies (Done)       Topic: Physical Therapy (Done)       Point: Mobility training (Done)       Learning Progress Summary            Patient Acceptance, E,TB, VU by  at 10/31/2024 1217                      Point: Home exercise program (Done)       Learning Progress Summary            Patient Acceptance, E,TB, VU by EW at 10/31/2024 1217                      Point: Body mechanics (Done)       Learning Progress Summary            Patient Acceptance, E,TB, VU by EW at 10/31/2024 1217                      Point: Precautions (Done)       Learning Progress Summary            Patient Acceptance, E,TB, VU by EW at 10/31/2024 1217                                      User Key       Initials Effective Dates Name Provider Type Discipline     08/27/24 -  Matt Hui, PT Student PT Student PT                  PT Recommendation and Plan  Anticipated Discharge Disposition (PT): (P) home with outpatient therapy services  Planned Therapy Interventions (PT): (P) balance training, bed mobility training, gait training, home exercise program, motor coordination training, neuromuscular re-education, patient/family education, postural re-education, ROM (range of motion), stair training, strengthening, stretching, transfer training  Therapy Frequency (PT): (P) daily  Plan of Care Reviewed With: (P) patient  Outcome Evaluation: (P) Pt presents with deficits in B LE strength and ROM as well as issues with general ambulation, transfers, endurance, and activity tolerance. Pt has bilateral flexion contractures and significant spasticity. Skilled PT intervention is reccommended to address noted deficits. Recommend d/c to home with outpatient therapy services.   Outcome Measures       Row Name 10/31/24 1200             How much help from another person do you  currently need...    Turning from your back to your side while in flat bed without using bedrails? 3 (P)   -EW      Moving from lying on back to sitting on the side of a flat bed without bedrails? 3 (P)   -EW      Moving to and from a bed to a chair (including a wheelchair)? 3 (P)   -EW      Standing up from a chair using your arms (e.g., wheelchair, bedside chair)? 3 (P)   -EW      Climbing 3-5 steps with a railing? 2 (P)   -EW      To walk in hospital room? 3 (P)   -EW      AM-PAC 6 Clicks Score (PT) 17 (P)   -EW         Functional Assessment    Outcome Measure Options AM-PAC 6 Clicks Basic Mobility (PT) (P)   -EW                User Key  (r) = Recorded By, (t) = Taken By, (c) = Cosigned By      Initials Name Provider Type    EW Matt Hui, PT Student PT Student                     Time Calculation:    PT Charges       Row Name 10/31/24 1206             Time Calculation    PT Received On 10/31/24 (P)   -EW      PT Goal Re-Cert Due Date 11/09/24 (P)   -EW         Untimed Charges    PT Eval/Re-eval Minutes 45 (P)   -EW         Total Minutes    Untimed Charges Total Minutes 45 (P)   -EW       Total Minutes 45 (P)   -EW                User Key  (r) = Recorded By, (t) = Taken By, (c) = Cosigned By      Initials Name Provider Type    EW Matt Hui, PT Student PT Student                  Therapy Charges for Today       Code Description Service Date Service Provider Modifiers Qty    [id number] HC PT EVAL LOW COMPLEXITY 3 10/31/2024 Matt Hui, PT Student GP 1            PT G-Codes  Outcome Measure Options: (P) AM-PAC 6 Clicks Basic Mobility (PT)  AM-PAC 6 Clicks Score (PT): (P) 17  AM-PAC 6 Clicks Score (OT): 14    Matt Hui PT Student  10/31/2024
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
## 2024-10-31 NOTE — PLAN OF CARE
Goal Outcome Evaluation:  Plan of Care Reviewed With: patient        Progress: no change  Outcome Evaluation: Patient presents with limitations affecting strength, activity tolerance, and balance impacting patient's ability to return home safely and independently.  The skills of a therapist will be required to safely and effectively implement the following treatment plan to restore maximal level of function    Anticipated Discharge Disposition (OT): home with home health, home with assist

## 2024-11-01 LAB
027 TOXIN: NORMAL
ALBUMIN SERPL-MCNC: 3.7 G/DL (ref 3.5–5.2)
ANION GAP SERPL CALCULATED.3IONS-SCNC: 11.1 MMOL/L (ref 5–15)
BACTERIA SPEC AEROBE CULT: ABNORMAL
BASOPHILS # BLD AUTO: 0.06 10*3/MM3 (ref 0–0.2)
BASOPHILS NFR BLD AUTO: 0.8 % (ref 0–1.5)
BUN SERPL-MCNC: 11 MG/DL (ref 6–20)
BUN/CREAT SERPL: 15.7 (ref 7–25)
C DIFF TOX GENS STL QL NAA+PROBE: NEGATIVE
CALCIUM SPEC-SCNC: 8.8 MG/DL (ref 8.6–10.5)
CHLORIDE SERPL-SCNC: 106 MMOL/L (ref 98–107)
CO2 SERPL-SCNC: 20.9 MMOL/L (ref 22–29)
CREAT SERPL-MCNC: 0.7 MG/DL (ref 0.76–1.27)
DEPRECATED RDW RBC AUTO: 41.9 FL (ref 37–54)
EGFRCR SERPLBLD CKD-EPI 2021: 108.8 ML/MIN/1.73
EOSINOPHIL # BLD AUTO: 0.34 10*3/MM3 (ref 0–0.4)
EOSINOPHIL NFR BLD AUTO: 4.3 % (ref 0.3–6.2)
ERYTHROCYTE [DISTWIDTH] IN BLOOD BY AUTOMATED COUNT: 13.6 % (ref 12.3–15.4)
GLUCOSE SERPL-MCNC: 106 MG/DL (ref 65–99)
GRAM STN SPEC: ABNORMAL
HCT VFR BLD AUTO: 38 % (ref 37.5–51)
HGB BLD-MCNC: 12.5 G/DL (ref 13–17.7)
IMM GRANULOCYTES # BLD AUTO: 0.04 10*3/MM3 (ref 0–0.05)
IMM GRANULOCYTES NFR BLD AUTO: 0.5 % (ref 0–0.5)
LYMPHOCYTES # BLD AUTO: 1.84 10*3/MM3 (ref 0.7–3.1)
LYMPHOCYTES NFR BLD AUTO: 23 % (ref 19.6–45.3)
MCH RBC QN AUTO: 28.2 PG (ref 26.6–33)
MCHC RBC AUTO-ENTMCNC: 32.9 G/DL (ref 31.5–35.7)
MCV RBC AUTO: 85.6 FL (ref 79–97)
MONOCYTES # BLD AUTO: 0.6 10*3/MM3 (ref 0.1–0.9)
MONOCYTES NFR BLD AUTO: 7.5 % (ref 5–12)
NEUTROPHILS NFR BLD AUTO: 5.12 10*3/MM3 (ref 1.7–7)
NEUTROPHILS NFR BLD AUTO: 63.9 % (ref 42.7–76)
NRBC BLD AUTO-RTO: 0 /100 WBC (ref 0–0.2)
PHOSPHATE SERPL-MCNC: 2.6 MG/DL (ref 2.5–4.5)
PLATELET # BLD AUTO: 209 10*3/MM3 (ref 140–450)
PMV BLD AUTO: 10.3 FL (ref 6–12)
POTASSIUM SERPL-SCNC: 3.8 MMOL/L (ref 3.5–5.2)
RBC # BLD AUTO: 4.44 10*6/MM3 (ref 4.14–5.8)
SODIUM SERPL-SCNC: 138 MMOL/L (ref 136–145)
WBC NRBC COR # BLD AUTO: 8 10*3/MM3 (ref 3.4–10.8)

## 2024-11-01 PROCEDURE — 25010000002 ENOXAPARIN PER 10 MG: Performed by: SURGERY

## 2024-11-01 PROCEDURE — 25010000002 PIPERACILLIN SOD-TAZOBACTAM PER 1 G: Performed by: SURGERY

## 2024-11-01 PROCEDURE — 97605 NEG PRS WND THER DME<=50SQCM: CPT

## 2024-11-01 PROCEDURE — 25010000002 KETOROLAC TROMETHAMINE PER 15 MG: Performed by: STUDENT IN AN ORGANIZED HEALTH CARE EDUCATION/TRAINING PROGRAM

## 2024-11-01 PROCEDURE — 25010000002 HYDROMORPHONE 1 MG/ML SOLUTION: Performed by: STUDENT IN AN ORGANIZED HEALTH CARE EDUCATION/TRAINING PROGRAM

## 2024-11-01 PROCEDURE — 25010000002 VANCOMYCIN 5 G RECONSTITUTED SOLUTION: Performed by: STUDENT IN AN ORGANIZED HEALTH CARE EDUCATION/TRAINING PROGRAM

## 2024-11-01 PROCEDURE — 80069 RENAL FUNCTION PANEL: CPT | Performed by: SURGERY

## 2024-11-01 PROCEDURE — 85025 COMPLETE CBC W/AUTO DIFF WBC: CPT | Performed by: SURGERY

## 2024-11-01 PROCEDURE — 25810000003 SODIUM CHLORIDE 0.9 % SOLUTION: Performed by: STUDENT IN AN ORGANIZED HEALTH CARE EDUCATION/TRAINING PROGRAM

## 2024-11-01 PROCEDURE — 25010000002 PIPERACILLIN SOD-TAZOBACTAM PER 1 G: Performed by: STUDENT IN AN ORGANIZED HEALTH CARE EDUCATION/TRAINING PROGRAM

## 2024-11-01 PROCEDURE — 25010000002 HYDROMORPHONE 1 MG/ML SOLUTION: Performed by: SURGERY

## 2024-11-01 PROCEDURE — 87493 C DIFF AMPLIFIED PROBE: CPT | Performed by: STUDENT IN AN ORGANIZED HEALTH CARE EDUCATION/TRAINING PROGRAM

## 2024-11-01 RX ORDER — ALUMINA, MAGNESIA, AND SIMETHICONE 2400; 2400; 240 MG/30ML; MG/30ML; MG/30ML
15 SUSPENSION ORAL EVERY 4 HOURS PRN
Status: DISCONTINUED | OUTPATIENT
Start: 2024-11-01 | End: 2024-11-04 | Stop reason: HOSPADM

## 2024-11-01 RX ORDER — NALOXONE HCL 0.4 MG/ML
0.4 VIAL (ML) INJECTION
Status: DISCONTINUED | OUTPATIENT
Start: 2024-11-01 | End: 2024-11-04 | Stop reason: HOSPADM

## 2024-11-01 RX ORDER — FENTANYL 50 UG/1
1 PATCH TRANSDERMAL
Status: DISCONTINUED | OUTPATIENT
Start: 2024-11-01 | End: 2024-11-04 | Stop reason: HOSPADM

## 2024-11-01 RX ADMIN — HYDROMORPHONE HYDROCHLORIDE 0.5 MG: 1 INJECTION, SOLUTION INTRAMUSCULAR; INTRAVENOUS; SUBCUTANEOUS at 05:34

## 2024-11-01 RX ADMIN — GABAPENTIN 300 MG: 300 CAPSULE ORAL at 16:18

## 2024-11-01 RX ADMIN — ALPRAZOLAM 0.5 MG: 0.25 TABLET ORAL at 18:36

## 2024-11-01 RX ADMIN — GABAPENTIN 300 MG: 300 CAPSULE ORAL at 08:15

## 2024-11-01 RX ADMIN — BUSPIRONE HYDROCHLORIDE 15 MG: 15 TABLET ORAL at 20:39

## 2024-11-01 RX ADMIN — ALPRAZOLAM 0.5 MG: 0.25 TABLET ORAL at 10:15

## 2024-11-01 RX ADMIN — PIPERACILLIN AND TAZOBACTAM 3.38 G: 3; .375 INJECTION, POWDER, FOR SOLUTION INTRAVENOUS at 02:24

## 2024-11-01 RX ADMIN — HYDROMORPHONE HYDROCHLORIDE 0.5 MG: 1 INJECTION, SOLUTION INTRAMUSCULAR; INTRAVENOUS; SUBCUTANEOUS at 00:08

## 2024-11-01 RX ADMIN — PANTOPRAZOLE SODIUM 40 MG: 40 TABLET, DELAYED RELEASE ORAL at 05:34

## 2024-11-01 RX ADMIN — BACLOFEN 20 MG: 10 TABLET ORAL at 20:39

## 2024-11-01 RX ADMIN — OLANZAPINE 5 MG: 5 TABLET, FILM COATED ORAL at 20:39

## 2024-11-01 RX ADMIN — HYDROMORPHONE HYDROCHLORIDE 0.5 MG: 1 INJECTION, SOLUTION INTRAMUSCULAR; INTRAVENOUS; SUBCUTANEOUS at 08:15

## 2024-11-01 RX ADMIN — NICOTINE 1 PATCH: 21 PATCH, EXTENDED RELEASE TRANSDERMAL at 08:15

## 2024-11-01 RX ADMIN — VANCOMYCIN HYDROCHLORIDE 1250 MG: 5 INJECTION, POWDER, LYOPHILIZED, FOR SOLUTION INTRAVENOUS at 22:18

## 2024-11-01 RX ADMIN — HYDROMORPHONE HYDROCHLORIDE 0.5 MG: 1 INJECTION, SOLUTION INTRAMUSCULAR; INTRAVENOUS; SUBCUTANEOUS at 02:24

## 2024-11-01 RX ADMIN — ALUMINUM HYDROXIDE, MAGNESIUM HYDROXIDE, AND DIMETHICONE 15 ML: 400; 400; 40 SUSPENSION ORAL at 22:54

## 2024-11-01 RX ADMIN — BUSPIRONE HYDROCHLORIDE 15 MG: 15 TABLET ORAL at 08:15

## 2024-11-01 RX ADMIN — OXYCODONE HYDROCHLORIDE AND ACETAMINOPHEN 1 TABLET: 5; 325 TABLET ORAL at 20:39

## 2024-11-01 RX ADMIN — ALPRAZOLAM 0.5 MG: 0.25 TABLET ORAL at 00:48

## 2024-11-01 RX ADMIN — PROBIOTIC PRODUCT - TAB 1 TABLET: TAB at 08:15

## 2024-11-01 RX ADMIN — PIPERACILLIN AND TAZOBACTAM 3.38 G: 3; .375 INJECTION, POWDER, FOR SOLUTION INTRAVENOUS at 12:00

## 2024-11-01 RX ADMIN — OXYCODONE HYDROCHLORIDE AND ACETAMINOPHEN 1 TABLET: 5; 325 TABLET ORAL at 04:35

## 2024-11-01 RX ADMIN — HYDROXYZINE HYDROCHLORIDE 50 MG: 25 TABLET, FILM COATED ORAL at 01:13

## 2024-11-01 RX ADMIN — PIPERACILLIN AND TAZOBACTAM 3.38 G: 3; .375 INJECTION, POWDER, FOR SOLUTION INTRAVENOUS at 20:38

## 2024-11-01 RX ADMIN — OXYCODONE HYDROCHLORIDE AND ACETAMINOPHEN 1 TABLET: 5; 325 TABLET ORAL at 16:18

## 2024-11-01 RX ADMIN — TIZANIDINE 4 MG: 4 TABLET ORAL at 12:00

## 2024-11-01 RX ADMIN — ENOXAPARIN SODIUM 40 MG: 100 INJECTION SUBCUTANEOUS at 08:15

## 2024-11-01 RX ADMIN — HYDROMORPHONE HYDROCHLORIDE 1 MG: 1 INJECTION, SOLUTION INTRAMUSCULAR; INTRAVENOUS; SUBCUTANEOUS at 10:15

## 2024-11-01 RX ADMIN — QUETIAPINE FUMARATE 400 MG: 200 TABLET ORAL at 20:40

## 2024-11-01 RX ADMIN — VANCOMYCIN HYDROCHLORIDE 1250 MG: 5 INJECTION, POWDER, LYOPHILIZED, FOR SOLUTION INTRAVENOUS at 10:22

## 2024-11-01 RX ADMIN — BACLOFEN 20 MG: 10 TABLET ORAL at 08:15

## 2024-11-01 RX ADMIN — KETOROLAC TROMETHAMINE 30 MG: 30 INJECTION, SOLUTION INTRAMUSCULAR; INTRAVENOUS at 18:37

## 2024-11-01 RX ADMIN — GABAPENTIN 300 MG: 300 CAPSULE ORAL at 20:39

## 2024-11-01 RX ADMIN — FENTANYL 1 PATCH: 50 PATCH TRANSDERMAL at 12:00

## 2024-11-01 NOTE — PLAN OF CARE
Goal Outcome Evaluation:  Plan of Care Reviewed With: patient        Progress: no change  Outcome Evaluation: Vitals stable. Pt c/o severe pain, percocet and dilaudid given as ordered. Pt repeatedly calling out for staff throughout the night. Pt did not sleep. Pt with loose BMs x4. Pt with heartburn, Dr Castro notified and increased frequency of GI cocktail. Will continue to monitor.

## 2024-11-01 NOTE — PLAN OF CARE
Goal Outcome Evaluation:              Outcome Evaluation: Pt is alert and oriented and anxious. Pt has wife at bedside. Wound care completed as ordered. Pain meds given for severe complaints of pain as ordered. Continue plan of care.
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
## 2024-11-01 NOTE — SIGNIFICANT NOTE
11/01/24 1129   OTHER   Discipline occupational therapist   Rehab Time/Intention   Session Not Performed patient unavailable for treatment  (OT attempted 2x w/[facility] staff)   Therapy Assessment/Plan (PT)   Criteria for Skilled Interventions Met (PT) yes;skilled treatment is necessary

## 2024-11-01 NOTE — NURSING NOTE
Palliative Care spoke with spouse this AM.  Spouse assumed [facility] meant patient had to be actively dying.  Educated admission to [facility] care with 6 months or less but did not mean patient had to be actively dying.  Spouse is agreeable for an EOS with [facility].  Informed [facility] and will continue to follow.    Yanna GOMEZ RN, [facility]  Palliative Care

## 2024-11-02 PROBLEM — G82.20 PARAPLEGIA: Status: ACTIVE | Noted: 2024-11-02

## 2024-11-02 LAB
ANION GAP SERPL CALCULATED.3IONS-SCNC: 8.4 MMOL/L (ref 5–15)
BACTERIA SPEC ANAEROBE CULT: ABNORMAL
BASOPHILS # BLD AUTO: 0.06 10*3/MM3 (ref 0–0.2)
BASOPHILS NFR BLD AUTO: 0.9 % (ref 0–1.5)
BUN SERPL-MCNC: 10 MG/DL (ref 6–20)
BUN/CREAT SERPL: 14.3 (ref 7–25)
CALCIUM SPEC-SCNC: 8.6 MG/DL (ref 8.6–10.5)
CHLORIDE SERPL-SCNC: 110 MMOL/L (ref 98–107)
CO2 SERPL-SCNC: 21.6 MMOL/L (ref 22–29)
CREAT SERPL-MCNC: 0.7 MG/DL (ref 0.76–1.27)
DEPRECATED RDW RBC AUTO: 42.4 FL (ref 37–54)
EGFRCR SERPLBLD CKD-EPI 2021: 108.8 ML/MIN/1.73
EOSINOPHIL # BLD AUTO: 0.32 10*3/MM3 (ref 0–0.4)
EOSINOPHIL NFR BLD AUTO: 4.8 % (ref 0.3–6.2)
ERYTHROCYTE [DISTWIDTH] IN BLOOD BY AUTOMATED COUNT: 13.7 % (ref 12.3–15.4)
GLUCOSE SERPL-MCNC: 118 MG/DL (ref 65–99)
HCT VFR BLD AUTO: 35.1 % (ref 37.5–51)
HGB BLD-MCNC: 11.4 G/DL (ref 13–17.7)
IMM GRANULOCYTES # BLD AUTO: 0.03 10*3/MM3 (ref 0–0.05)
IMM GRANULOCYTES NFR BLD AUTO: 0.5 % (ref 0–0.5)
LYMPHOCYTES # BLD AUTO: 1.9 10*3/MM3 (ref 0.7–3.1)
LYMPHOCYTES NFR BLD AUTO: 28.5 % (ref 19.6–45.3)
MCH RBC QN AUTO: 28 PG (ref 26.6–33)
MCHC RBC AUTO-ENTMCNC: 32.5 G/DL (ref 31.5–35.7)
MCV RBC AUTO: 86.2 FL (ref 79–97)
MONOCYTES # BLD AUTO: 0.57 10*3/MM3 (ref 0.1–0.9)
MONOCYTES NFR BLD AUTO: 8.6 % (ref 5–12)
NEUTROPHILS NFR BLD AUTO: 3.78 10*3/MM3 (ref 1.7–7)
NEUTROPHILS NFR BLD AUTO: 56.7 % (ref 42.7–76)
NRBC BLD AUTO-RTO: 0 /100 WBC (ref 0–0.2)
PLATELET # BLD AUTO: 191 10*3/MM3 (ref 140–450)
PMV BLD AUTO: 10.3 FL (ref 6–12)
POTASSIUM SERPL-SCNC: 3.6 MMOL/L (ref 3.5–5.2)
RBC # BLD AUTO: 4.07 10*6/MM3 (ref 4.14–5.8)
SODIUM SERPL-SCNC: 140 MMOL/L (ref 136–145)
VANCOMYCIN TROUGH SERPL-MCNC: 20.61 MCG/ML (ref 5–20)
WBC NRBC COR # BLD AUTO: 6.66 10*3/MM3 (ref 3.4–10.8)

## 2024-11-02 PROCEDURE — 80048 BASIC METABOLIC PNL TOTAL CA: CPT | Performed by: SURGERY

## 2024-11-02 PROCEDURE — 25010000002 KETOROLAC TROMETHAMINE PER 15 MG: Performed by: STUDENT IN AN ORGANIZED HEALTH CARE EDUCATION/TRAINING PROGRAM

## 2024-11-02 PROCEDURE — 85025 COMPLETE CBC W/AUTO DIFF WBC: CPT | Performed by: SURGERY

## 2024-11-02 PROCEDURE — 25810000003 SODIUM CHLORIDE 0.9 % SOLUTION: Performed by: STUDENT IN AN ORGANIZED HEALTH CARE EDUCATION/TRAINING PROGRAM

## 2024-11-02 PROCEDURE — 25010000002 VANCOMYCIN 5 G RECONSTITUTED SOLUTION: Performed by: STUDENT IN AN ORGANIZED HEALTH CARE EDUCATION/TRAINING PROGRAM

## 2024-11-02 PROCEDURE — 25010000002 PIPERACILLIN SOD-TAZOBACTAM PER 1 G: Performed by: STUDENT IN AN ORGANIZED HEALTH CARE EDUCATION/TRAINING PROGRAM

## 2024-11-02 PROCEDURE — 25010000002 ENOXAPARIN PER 10 MG: Performed by: SURGERY

## 2024-11-02 PROCEDURE — 80202 ASSAY OF VANCOMYCIN: CPT | Performed by: STUDENT IN AN ORGANIZED HEALTH CARE EDUCATION/TRAINING PROGRAM

## 2024-11-02 RX ADMIN — OXYCODONE HYDROCHLORIDE AND ACETAMINOPHEN 1 TABLET: 5; 325 TABLET ORAL at 08:27

## 2024-11-02 RX ADMIN — PIPERACILLIN AND TAZOBACTAM 3.38 G: 3; .375 INJECTION, POWDER, FOR SOLUTION INTRAVENOUS at 21:09

## 2024-11-02 RX ADMIN — ALPRAZOLAM 0.5 MG: 0.25 TABLET ORAL at 18:44

## 2024-11-02 RX ADMIN — QUETIAPINE FUMARATE 400 MG: 200 TABLET ORAL at 21:09

## 2024-11-02 RX ADMIN — BUSPIRONE HYDROCHLORIDE 15 MG: 15 TABLET ORAL at 21:11

## 2024-11-02 RX ADMIN — Medication 10 ML: at 21:10

## 2024-11-02 RX ADMIN — ALPRAZOLAM 0.5 MG: 0.25 TABLET ORAL at 10:24

## 2024-11-02 RX ADMIN — PIPERACILLIN AND TAZOBACTAM 3.38 G: 3; .375 INJECTION, POWDER, FOR SOLUTION INTRAVENOUS at 04:18

## 2024-11-02 RX ADMIN — GABAPENTIN 300 MG: 300 CAPSULE ORAL at 21:09

## 2024-11-02 RX ADMIN — GABAPENTIN 300 MG: 300 CAPSULE ORAL at 08:27

## 2024-11-02 RX ADMIN — BUSPIRONE HYDROCHLORIDE 15 MG: 15 TABLET ORAL at 08:27

## 2024-11-02 RX ADMIN — ENOXAPARIN SODIUM 40 MG: 100 INJECTION SUBCUTANEOUS at 08:26

## 2024-11-02 RX ADMIN — BACLOFEN 20 MG: 10 TABLET ORAL at 21:09

## 2024-11-02 RX ADMIN — TIZANIDINE 4 MG: 4 TABLET ORAL at 17:03

## 2024-11-02 RX ADMIN — BACLOFEN 20 MG: 10 TABLET ORAL at 08:26

## 2024-11-02 RX ADMIN — NICOTINE 1 PATCH: 21 PATCH, EXTENDED RELEASE TRANSDERMAL at 08:31

## 2024-11-02 RX ADMIN — OXYCODONE HYDROCHLORIDE AND ACETAMINOPHEN 1 TABLET: 5; 325 TABLET ORAL at 00:54

## 2024-11-02 RX ADMIN — OXYCODONE HYDROCHLORIDE AND ACETAMINOPHEN 1 TABLET: 5; 325 TABLET ORAL at 21:10

## 2024-11-02 RX ADMIN — OXYCODONE HYDROCHLORIDE AND ACETAMINOPHEN 1 TABLET: 5; 325 TABLET ORAL at 17:01

## 2024-11-02 RX ADMIN — ALPRAZOLAM 0.5 MG: 0.25 TABLET ORAL at 02:39

## 2024-11-02 RX ADMIN — VANCOMYCIN HYDROCHLORIDE 1250 MG: 5 INJECTION, POWDER, LYOPHILIZED, FOR SOLUTION INTRAVENOUS at 10:25

## 2024-11-02 RX ADMIN — OXYCODONE HYDROCHLORIDE AND ACETAMINOPHEN 1 TABLET: 5; 325 TABLET ORAL at 12:37

## 2024-11-02 RX ADMIN — PIPERACILLIN AND TAZOBACTAM 3.38 G: 3; .375 INJECTION, POWDER, FOR SOLUTION INTRAVENOUS at 12:56

## 2024-11-02 RX ADMIN — KETOROLAC TROMETHAMINE 30 MG: 30 INJECTION, SOLUTION INTRAMUSCULAR; INTRAVENOUS at 02:39

## 2024-11-02 RX ADMIN — OLANZAPINE 5 MG: 5 TABLET, FILM COATED ORAL at 21:10

## 2024-11-02 RX ADMIN — GABAPENTIN 300 MG: 300 CAPSULE ORAL at 15:42

## 2024-11-02 RX ADMIN — PROBIOTIC PRODUCT - TAB 1 TABLET: TAB at 08:26

## 2024-11-02 RX ADMIN — TIZANIDINE 4 MG: 4 TABLET ORAL at 03:27

## 2024-11-02 RX ADMIN — PANTOPRAZOLE SODIUM 40 MG: 40 TABLET, DELAYED RELEASE ORAL at 06:04
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
## 2024-11-02 NOTE — PROGRESS NOTES
[facility] Clinical Pharmacy Services: Vancomycin Monitoring Note    Haider Jacobs is a 53 y.o. male who is on day 4/7 of pharmacy to dose vancomycin for Skin and Soft Tissue.    Previous Vancomycin Dose:   1250 mg IV every  12  hours  Imaging Reviewed?: Yes  Updated Cultures and Sensitivities:   Microbiology Results (last 10 days)       Procedure Component Value - Date/Time    Clostridioides difficile Toxin, PCR - Stool, Per Rectum [[id number]] Collected: 11/01/24 0736    Lab Status: Final result Specimen: Stool from Per Rectum Updated: 11/01/24 0910     Toxigenic C. difficile by PCR Negative     027 Toxin Presumptive Negative    Narrative:      The result indicates the absence of toxigenic C. difficile from stool specimen.     Anaerobic Culture - Wound, Buttock, Left [[id number]]  (Abnormal) Collected: 10/30/24 1137    Lab Status: Final result Specimen: Wound from Buttock, Left Updated: 11/02/24 0817     Anaerobic Culture Bacteroides fragilis group    Wound Culture - Wound, Buttock, Left [[id number]]  (Abnormal) Collected: 10/30/24 1137    Lab Status: Final result Specimen: Wound from Buttock, Left Updated: 11/01/24 1016     Wound Culture Light growth (2+) Morganella morganii ssp sibonii      Moderate growth (3+) Streptococcus anginosus     Gram Stain Moderate (3+) WBCs seen      Many (4+) Gram negative bacilli      Many (4+) Gram positive cocci in pairs, chains and clusters    Narrative:      Refer to previous wound culture collected on 10/29/2024 1528 for MICs.    Blood Culture - Blood, Arm, Left [[id number]]  (Normal) Collected: 10/29/24 1850    Lab Status: Preliminary result Specimen: Blood from Arm, Left Updated: 11/01/24 1900     Blood Culture No growth at 3 days    COVID PRE-OP / PRE-PROCEDURE SCREENING ORDER (NO ISOLATION) - Swab, Nasopharynx [[id number]]  (Normal) Collected: 10/29/24 1806    Lab Status: Final result Specimen: Swab from Nasopharynx Updated: 10/29/24 1851    Narrative:      The following  orders were created for panel order COVID PRE-OP / PRE-PROCEDURE SCREENING ORDER (NO ISOLATION) - Swab, Nasopharynx.  Procedure                               Abnormality         Status                     ---------                               -----------         ------                     COVID-19, FLU A/B, RSV P...[[id number]]  Normal              Final result                 Please view results for these tests on the individual orders.    COVID-19, FLU A/B, RSV PCR 1 HR TAT - Swab, Nasopharynx [[id number]]  (Normal) Collected: 10/29/24 1806    Lab Status: Final result Specimen: Swab from Nasopharynx Updated: 10/29/24 1851     COVID19 Not Detected     Influenza A PCR Not Detected     Influenza B PCR Not Detected     RSV, PCR Not Detected    Narrative:      Fact sheet for providers: https://www.fda.gov/media/841572/download    Fact sheet for patients: https://www.fda.gov/media/968226/download    Test performed by PCR.    Blood Culture - Blood, Arm, Right [[id number]]  (Normal) Collected: 10/29/24 1805    Lab Status: Preliminary result Specimen: Blood from Arm, Right Updated: 11/01/24 1815     Blood Culture No growth at 3 days    Wound Culture - Wound, Buttock, Left [[id number]]  (Abnormal)  (Susceptibility) Collected: 10/29/24 1528    Lab Status: Final result Specimen: Wound from Buttock, Left Updated: 11/01/24 1016     Wound Culture Moderate growth (3+) Providencia stuartii      Moderate growth (3+) Morganella morganii ssp sibonii      Moderate growth (3+) Streptococcus anginosus      Light growth (2+) Normal Skin Maryanne     Gram Stain Few (2+) Gram negative bacilli      Rare (1+) Gram positive cocci in pairs and chains      Moderate (3+) WBCs seen    Susceptibility        Providencia stuartii      MIKA      Amikacin <=2 ug/ml Susceptible      Ampicillin 16 ug/ml Resistant      Ampicillin + Sulbactam 16 ug/ml Intermediate      Cefepime <=1 ug/ml Susceptible      Ceftazidime <=1 ug/ml Susceptible      Ceftriaxone <=1  "ug/ml Susceptible      Gentamicin <=1 ug/ml Resistant      Levofloxacin <=0.12 ug/ml Susceptible      Piperacillin + Tazobactam <=4 ug/ml Susceptible      Tetracycline >=16 ug/ml Resistant      Tobramycin <=1 ug/ml Resistant      Trimethoprim + Sulfamethoxazole <=20 ug/ml Susceptible                       Susceptibility        Morganella morganii ssp sibonii      MIKA      Amoxicillin + Clavulanate >=32 ug/ml Resistant      Ampicillin >=32 ug/ml Resistant      Ampicillin + Sulbactam 16 ug/ml Intermediate      Cefepime <=1 ug/ml Susceptible      Ceftazidime <=1 ug/ml Susceptible      Ceftriaxone <=1 ug/ml Susceptible      Gentamicin <=1 ug/ml Susceptible      Levofloxacin <=0.12 ug/ml Susceptible      Piperacillin + Tazobactam <=4 ug/ml Susceptible      Tetracycline >=16 ug/ml Resistant      Trimethoprim + Sulfamethoxazole <=20 ug/ml Susceptible                       Susceptibility        Streptococcus anginosus      MIKA      Ceftriaxone <=0.12 ug/ml Susceptible      Penicillin G <=0.06 ug/ml Susceptible      Vancomycin 0.5 ug/ml Susceptible                       Susceptibility Comments       Providencia stuartii    Cefazolin sensitivity will not be reported for Enterobacteriaceae in non-urine isolates. If cefazolin is preferred, please call the microbiology lab to request an E-test.  With the exception of urinary-sourced infections, aminoglycosides should not be used as monotherapy.      Morganella morganii ssp sibonii    Cefazolin sensitivity will not be reported for Enterobacteriaceae in non-urine isolates. If cefazolin is preferred, please call the microbiology lab to request an E-test.  With the exception of urinary-sourced infections, aminoglycosides should not be used as monotherapy.                         Vitals/Labs  Ht: 172.7 cm (67.99\"); Wt: 59.1 kg (130 lb 4.7 oz)   Temp (24hrs), Av °F (36.7 °C), Min:97.3 °F (36.3 °C), Max:98.8 °F (37.1 °C)   Estimated Creatinine Clearance: 99.7 mL/min (A) (by C-G " formula based on SCr of 0.7 mg/dL (L)).     Results from last 7 days   Lab Units 11/02/24  0751 11/02/24  0544 11/01/24  0532 10/31/24  0533   VANCOMYCIN RM mcg/mL  --   --   --  9.80   VANCOMYCIN TR mcg/mL 20.61*  --   --   --    CREATININE mg/dL  --  0.70* 0.70* 0.78   WBC 10*3/mm3  --  6.66 8.00 9.42     Assessment/Plan    Current Vancomycin Dose:  1250 mg IV every 12 hours; which provides the following predicted parameters:  Exposure target: AUC24 (range)400-600 mg/L.hr   AUC24,ss: 572 mg/L.hr  Probability of AUC24 > 400: 100 %  Ctrough,ss: 18.2 mg/L  Probability of Ctrough,ss > 20: 33 %  Probability of nephrotoxicity (Lodise JOVANNI 2009): 14 %  No plan for additional levels at this time   Recommend discontinuation at this time given cultures and sensitivities.   We will continue to monitor patient changes and renal function     Thank you for involving pharmacy in this patient's care. Please contact pharmacy with any questions or concerns.    Liliana Sky  Clinical Pharmacist

## 2024-11-02 NOTE — PLAN OF CARE
Goal Outcome Evaluation:  Plan of Care Reviewed With: patient        Progress: improving  Outcome Evaluation: Pt is A&Ox4, VSS. [facility] Pt was treated for pain with prn pain medications, pt also treated with prn medications for heartburn, anxiety, and muscle spasms. Pt was finally able to sleep a bit once muscle spasms calmed. Pt's wound vac remains intact 75 mmhg to left gluteal area. Pt awaiting discharge planning, plan of care continues.

## 2024-11-02 NOTE — SIGNIFICANT NOTE
Wound Eval / Progress Noted    [facility]     Patient Name: Haider Jacobs  : 1970  MRN: [id number]  Today's Date: 2024  Onset of Illness/Injury or Date of Surgery: 10/29/24              Admit Date: 10/29/2024    Visit Dx:    ICD-10-CM ICD-9-CM   1. Skin ulcer of sacrum, unspecified ulcer stage  L98.429 707.8   2. Gluteal abscess  L02.31 682.5   3. Difficulty in walking  R26.2 719.7         Gluteal abscess        Past Medical History:   Diagnosis Date    Anxiety     Chronic tetraplegia     Contracture of hand     LEFT HAND TIGHT HAND CONTRACTURE. RIGHT HAD CAN OPEN 30%.    Drug abuse     Hematuria     Neurogenic bladder     Spinal cord injury at C1-C4 level without spinal injury     Urinary retention         Past Surgical History:   Procedure Laterality Date    ANKLE SURGERY      CERVICAL FUSION      C2-C7    DEBRIDEMENT OF ISCHIAL ULCER/BUTTOCKS WOUND Left 10/30/2024    Procedure: DEBRIDEMENT OF ISCHIAL ULCER/BUTTOCKS WOUND: clean and remove unhealthy tissue from the wound using surgical instruments;  Surgeon: Saeid Quesada MD;  Location: [facility];  Service: General;  Laterality: Left;    FRACTURE SURGERY      ARM    URETEROSCOPY LASER LITHOTRIPSY WITH STENT INSERTION Bilateral 2023    Procedure: CYSTOSCOPY BILATERAL RETROGRADE PYELOGRAM BLADDER STONE EXTRACTION;  Surgeon: Jennifer Bowman MD;  Location: [facility];  Service: Urology;  Laterality: Bilateral;         Physical Assessment:  Wound 10/30/24 1122 Left gluteal (Active)   Wound Image   24 1655   Pressure Injury Stage 3 24 1655   Dressing Appearance dry;intact 24 165   Closure None 24 1655   Base moist;red;yellow;slough 24 1655   Red (%), Wound Tissue Color 75 24 1655   Yellow (%), Wound Tissue Color 25 24 1655   Periwound dry;pink 24 1655   Periwound Temperature warm 24 165   Periwound Skin Turgor soft 24 165   Edges open 24 1655   Wound Length (cm)  2.4 cm 11/01/24 1655   Wound Width (cm) 2.7 cm 11/01/24 1655   Wound Depth (cm) 2.9 cm 11/01/24 1655   Wound Surface Area (cm^2) 6.48 cm^2 11/01/24 1655   Wound Volume (cm^3) 18.792 cm^3 11/01/24 1655   Drainage Characteristics/Odor serosanguineous;tan 11/01/24 1655   Drainage Amount scant 11/01/24 1655   Care, Wound cleansed with;irrigated with;sterile normal saline;negative pressure wound therapy 11/01/24 1655   Dressing Care dressing removed;dressing applied;foam;transparent film 11/01/24 1655   Periwound Care absorptive dressing applied 11/01/24 1655   NPWT (Negative Pressure Wound Therapy) 11/01/24 1655 Left Ischium (Active)   Therapy Setting continuous therapy 11/01/24 1655   Dressing foam, black 11/01/24 1655   Pressure Setting 75 mmHg 11/01/24 1655   Sponges Inserted 1;other (see comments) 11/01/24 1655   Finger sweep complete Yes 11/01/24 1655      Wound Check / Follow-up:  Patient seen today for wound VAC placement.  Patient is awake, alert, and oriented.  Patient underwent an incision and drainage abscess with debridement of soft tissue left buttock wound on 10/30/2024.  Patient is agreeable to placement of wound VAC.  Educated patient on purpose and benefit of wound VAC placement.  Patient states his current plan is to discharge home with hospice.  Awaiting discharge planning to determine need for home wound VAC. Patient's wife called patient on his telephone upon completion of assessment.  Educated patient's wife regarding utilization of wound VAC therapy.    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  Wound base presents with tightly adhered moist yellow slough, with moist red tissue.  Periwound tissue is dry with pink scar tissue noted.  Cleansed and irrigated with normal saline and gauze, blotted dry. Skin-Prep applied to periwound tissue.  Wound base was lightly filled with 1 piece of black foam. Foams was draped with transparent film.  One bridge was made from wound base with one  additional piece of black foam.  Dressing was connected to wound VAC suction at 125 mmHg.  Seal obtained with no signs of lifting or leaking.  Patient was unable to tolerate wound VAC suction at 125 mmHg.  Decreased suction to 100 mmHg; however, patient was still unable to tolerate.  Decreased suction to 75 mmHg, and patient reported dissection level was tolerable.  Patient was educated on the benefit of maintaining the highest level of section as tolerable.  Patient reports he will notify staff if he feels a higher level of section can be tolerated.  Recommending to continue wound VAC therapy with Monday, Wednesday, and Friday dressing changes.  Implement every 2 hour turns and offload at all times.  Discussed findings with Surgeon and orders obtained.    Impression: Stage III pressure injury left ischium post debridement with placement of wound VAC    Short term goals:  Regain skin integrity, skin protection, moisture prevention, pressure reduction, negative pressure wound therapy    Nandini Campo RN    11/1/2024    22:22 EDT

## 2024-11-02 NOTE — PLAN OF CARE
Goal Outcome Evaluation:  Plan of Care Reviewed With: patient           Outcome Evaluation: VSS, A&O x 4.  patient voiced anger regarding the strength on his prescribed pain medication this morning though as the day progress he became more amiable towards his treatment plan.  though he still called pain, and anxiety medication frequently.  wound vac intact and will continue to monitor.

## 2024-11-03 LAB
ANION GAP SERPL CALCULATED.3IONS-SCNC: 7.8 MMOL/L (ref 5–15)
BACTERIA SPEC AEROBE CULT: NORMAL
BACTERIA SPEC AEROBE CULT: NORMAL
BASOPHILS # BLD AUTO: 0.05 10*3/MM3 (ref 0–0.2)
BASOPHILS NFR BLD AUTO: 0.7 % (ref 0–1.5)
BUN SERPL-MCNC: 28 MG/DL (ref 6–20)
BUN/CREAT SERPL: 20.3 (ref 7–25)
CALCIUM SPEC-SCNC: 8.9 MG/DL (ref 8.6–10.5)
CHLORIDE SERPL-SCNC: 112 MMOL/L (ref 98–107)
CO2 SERPL-SCNC: 24.2 MMOL/L (ref 22–29)
CREAT SERPL-MCNC: 1.38 MG/DL (ref 0.76–1.27)
DEPRECATED RDW RBC AUTO: 44.7 FL (ref 37–54)
EGFRCR SERPLBLD CKD-EPI 2021: 60.4 ML/MIN/1.73
EOSINOPHIL # BLD AUTO: 0.38 10*3/MM3 (ref 0–0.4)
EOSINOPHIL NFR BLD AUTO: 5 % (ref 0.3–6.2)
ERYTHROCYTE [DISTWIDTH] IN BLOOD BY AUTOMATED COUNT: 14.1 % (ref 12.3–15.4)
GLUCOSE SERPL-MCNC: 114 MG/DL (ref 65–99)
HCT VFR BLD AUTO: 37.1 % (ref 37.5–51)
HGB BLD-MCNC: 11.9 G/DL (ref 13–17.7)
IMM GRANULOCYTES # BLD AUTO: 0.01 10*3/MM3 (ref 0–0.05)
IMM GRANULOCYTES NFR BLD AUTO: 0.1 % (ref 0–0.5)
LYMPHOCYTES # BLD AUTO: 1.5 10*3/MM3 (ref 0.7–3.1)
LYMPHOCYTES NFR BLD AUTO: 19.6 % (ref 19.6–45.3)
MCH RBC QN AUTO: 27.9 PG (ref 26.6–33)
MCHC RBC AUTO-ENTMCNC: 32.1 G/DL (ref 31.5–35.7)
MCV RBC AUTO: 86.9 FL (ref 79–97)
MONOCYTES # BLD AUTO: 0.61 10*3/MM3 (ref 0.1–0.9)
MONOCYTES NFR BLD AUTO: 8 % (ref 5–12)
NEUTROPHILS NFR BLD AUTO: 5.12 10*3/MM3 (ref 1.7–7)
NEUTROPHILS NFR BLD AUTO: 66.6 % (ref 42.7–76)
NRBC BLD AUTO-RTO: 0 /100 WBC (ref 0–0.2)
PLATELET # BLD AUTO: 191 10*3/MM3 (ref 140–450)
PMV BLD AUTO: 10.1 FL (ref 6–12)
POTASSIUM SERPL-SCNC: 4 MMOL/L (ref 3.5–5.2)
RBC # BLD AUTO: 4.27 10*6/MM3 (ref 4.14–5.8)
SODIUM SERPL-SCNC: 144 MMOL/L (ref 136–145)
WBC NRBC COR # BLD AUTO: 7.67 10*3/MM3 (ref 3.4–10.8)

## 2024-11-03 PROCEDURE — 25810000003 SODIUM CHLORIDE 0.9 % SOLUTION: Performed by: STUDENT IN AN ORGANIZED HEALTH CARE EDUCATION/TRAINING PROGRAM

## 2024-11-03 PROCEDURE — 25010000002 PIPERACILLIN SOD-TAZOBACTAM PER 1 G: Performed by: STUDENT IN AN ORGANIZED HEALTH CARE EDUCATION/TRAINING PROGRAM

## 2024-11-03 PROCEDURE — 85025 COMPLETE CBC W/AUTO DIFF WBC: CPT | Performed by: SURGERY

## 2024-11-03 PROCEDURE — 25010000002 VANCOMYCIN 5 G RECONSTITUTED SOLUTION: Performed by: STUDENT IN AN ORGANIZED HEALTH CARE EDUCATION/TRAINING PROGRAM

## 2024-11-03 PROCEDURE — 80048 BASIC METABOLIC PNL TOTAL CA: CPT | Performed by: SURGERY

## 2024-11-03 PROCEDURE — 25010000002 ENOXAPARIN PER 10 MG: Performed by: SURGERY

## 2024-11-03 RX ORDER — OXYCODONE AND ACETAMINOPHEN 7.5; 325 MG/1; MG/1
1 TABLET ORAL EVERY 6 HOURS PRN
Status: DISCONTINUED | OUTPATIENT
Start: 2024-11-03 | End: 2024-11-04 | Stop reason: HOSPADM

## 2024-11-03 RX ADMIN — NICOTINE 1 PATCH: 21 PATCH, EXTENDED RELEASE TRANSDERMAL at 08:25

## 2024-11-03 RX ADMIN — OXYCODONE HYDROCHLORIDE AND ACETAMINOPHEN 1 TABLET: 7.5; 325 TABLET ORAL at 09:42

## 2024-11-03 RX ADMIN — Medication 10 ML: at 21:12

## 2024-11-03 RX ADMIN — BACLOFEN 20 MG: 10 TABLET ORAL at 08:22

## 2024-11-03 RX ADMIN — GABAPENTIN 300 MG: 300 CAPSULE ORAL at 08:22

## 2024-11-03 RX ADMIN — VANCOMYCIN HYDROCHLORIDE 1250 MG: 5 INJECTION, POWDER, LYOPHILIZED, FOR SOLUTION INTRAVENOUS at 22:00

## 2024-11-03 RX ADMIN — VANCOMYCIN HYDROCHLORIDE 1250 MG: 5 INJECTION, POWDER, LYOPHILIZED, FOR SOLUTION INTRAVENOUS at 01:05

## 2024-11-03 RX ADMIN — GABAPENTIN 300 MG: 300 CAPSULE ORAL at 15:26

## 2024-11-03 RX ADMIN — PIPERACILLIN AND TAZOBACTAM 3.38 G: 3; .375 INJECTION, POWDER, FOR SOLUTION INTRAVENOUS at 12:18

## 2024-11-03 RX ADMIN — OXYCODONE HYDROCHLORIDE AND ACETAMINOPHEN 1 TABLET: 7.5; 325 TABLET ORAL at 21:49

## 2024-11-03 RX ADMIN — PIPERACILLIN AND TAZOBACTAM 3.38 G: 3; .375 INJECTION, POWDER, FOR SOLUTION INTRAVENOUS at 21:13

## 2024-11-03 RX ADMIN — OXYCODONE HYDROCHLORIDE AND ACETAMINOPHEN 1 TABLET: 5; 325 TABLET ORAL at 05:35

## 2024-11-03 RX ADMIN — OXYCODONE HYDROCHLORIDE AND ACETAMINOPHEN 1 TABLET: 5; 325 TABLET ORAL at 01:05

## 2024-11-03 RX ADMIN — OLANZAPINE 5 MG: 5 TABLET, FILM COATED ORAL at 21:13

## 2024-11-03 RX ADMIN — GABAPENTIN 300 MG: 300 CAPSULE ORAL at 21:13

## 2024-11-03 RX ADMIN — OXYCODONE HYDROCHLORIDE AND ACETAMINOPHEN 1 TABLET: 7.5; 325 TABLET ORAL at 15:45

## 2024-11-03 RX ADMIN — ALPRAZOLAM 0.5 MG: 0.25 TABLET ORAL at 15:26

## 2024-11-03 RX ADMIN — PANTOPRAZOLE SODIUM 40 MG: 40 TABLET, DELAYED RELEASE ORAL at 05:35

## 2024-11-03 RX ADMIN — PROBIOTIC PRODUCT - TAB 1 TABLET: TAB at 08:22

## 2024-11-03 RX ADMIN — ALPRAZOLAM 0.5 MG: 0.25 TABLET ORAL at 23:29

## 2024-11-03 RX ADMIN — ALPRAZOLAM 0.5 MG: 0.25 TABLET ORAL at 05:35

## 2024-11-03 RX ADMIN — TIZANIDINE 4 MG: 4 TABLET ORAL at 21:13

## 2024-11-03 RX ADMIN — BUSPIRONE HYDROCHLORIDE 15 MG: 15 TABLET ORAL at 08:22

## 2024-11-03 RX ADMIN — QUETIAPINE FUMARATE 400 MG: 200 TABLET ORAL at 21:13

## 2024-11-03 RX ADMIN — BUSPIRONE HYDROCHLORIDE 15 MG: 15 TABLET ORAL at 21:13

## 2024-11-03 RX ADMIN — ENOXAPARIN SODIUM 40 MG: 100 INJECTION SUBCUTANEOUS at 08:23

## 2024-11-03 RX ADMIN — PIPERACILLIN AND TAZOBACTAM 3.38 G: 3; .375 INJECTION, POWDER, FOR SOLUTION INTRAVENOUS at 04:34

## 2024-11-03 RX ADMIN — BACLOFEN 20 MG: 10 TABLET ORAL at 21:13

## 2024-11-03 NOTE — PLAN OF CARE
Goal Outcome Evaluation:  Plan of Care Reviewed With: patient           Outcome Evaluation: Patient aox4, anxious at beginning of shift but rested well throughout the night. PRN percocet given for reported buttock and left hip pain, effective intervention. No acute changes noted or reported this shift, continue poc.

## 2024-11-03 NOTE — PLAN OF CARE
Goal Outcome Evaluation:  Plan of Care Reviewed With: patient, spouse        Progress: no change  Outcome Evaluation: PT is AAOx4, VSS, PRN Oxy given as ordered for pain, (R) chest Fentanyl patch intact, wound vac intact with scant drainage, tolerating IV Zosyn, PRN Xanax given as ordered for anxiety, wound care completed this shift, tolerating diet, UOP adequate, no c/o of SOA, N/V/D, no new concerns voiced by patient, bed in low/locked position, call light within reach, continue with current POC at this time.
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
## 2024-11-04 ENCOUNTER — READMISSION MANAGEMENT (OUTPATIENT)
Dept: CALL CENTER | Facility: HOSPITAL | Age: 54
End: 2024-11-04
Payer: COMMERCIAL

## 2024-11-04 VITALS
WEIGHT: 130.07 LBS | SYSTOLIC BLOOD PRESSURE: 119 MMHG | BODY MASS INDEX: 19.71 KG/M2 | HEART RATE: 80 BPM | DIASTOLIC BLOOD PRESSURE: 58 MMHG | TEMPERATURE: 98.7 F | HEIGHT: 68 IN | RESPIRATION RATE: 20 BRPM | OXYGEN SATURATION: 99 %

## 2024-11-04 DIAGNOSIS — L02.31 GLUTEAL ABSCESS: Primary | ICD-10-CM

## 2024-11-04 LAB
ANION GAP SERPL CALCULATED.3IONS-SCNC: 10.6 MMOL/L (ref 5–15)
BASOPHILS # BLD AUTO: 0.07 10*3/MM3 (ref 0–0.2)
BASOPHILS NFR BLD AUTO: 0.8 % (ref 0–1.5)
BUN SERPL-MCNC: 39 MG/DL (ref 6–20)
BUN/CREAT SERPL: 25.5 (ref 7–25)
CALCIUM SPEC-SCNC: 9.4 MG/DL (ref 8.6–10.5)
CHLORIDE SERPL-SCNC: 110 MMOL/L (ref 98–107)
CO2 SERPL-SCNC: 24.4 MMOL/L (ref 22–29)
CREAT SERPL-MCNC: 1.53 MG/DL (ref 0.76–1.27)
DEPRECATED RDW RBC AUTO: 45 FL (ref 37–54)
EGFRCR SERPLBLD CKD-EPI 2021: 53.4 ML/MIN/1.73
EOSINOPHIL # BLD AUTO: 0.46 10*3/MM3 (ref 0–0.4)
EOSINOPHIL NFR BLD AUTO: 5.4 % (ref 0.3–6.2)
ERYTHROCYTE [DISTWIDTH] IN BLOOD BY AUTOMATED COUNT: 14.2 % (ref 12.3–15.4)
GLUCOSE SERPL-MCNC: 88 MG/DL (ref 65–99)
HCT VFR BLD AUTO: 39.5 % (ref 37.5–51)
HGB BLD-MCNC: 12.7 G/DL (ref 13–17.7)
IMM GRANULOCYTES # BLD AUTO: 0.02 10*3/MM3 (ref 0–0.05)
IMM GRANULOCYTES NFR BLD AUTO: 0.2 % (ref 0–0.5)
LYMPHOCYTES # BLD AUTO: 1.6 10*3/MM3 (ref 0.7–3.1)
LYMPHOCYTES NFR BLD AUTO: 18.7 % (ref 19.6–45.3)
MCH RBC QN AUTO: 28.1 PG (ref 26.6–33)
MCHC RBC AUTO-ENTMCNC: 32.2 G/DL (ref 31.5–35.7)
MCV RBC AUTO: 87.4 FL (ref 79–97)
MONOCYTES # BLD AUTO: 0.78 10*3/MM3 (ref 0.1–0.9)
MONOCYTES NFR BLD AUTO: 9.1 % (ref 5–12)
NEUTROPHILS NFR BLD AUTO: 5.62 10*3/MM3 (ref 1.7–7)
NEUTROPHILS NFR BLD AUTO: 65.8 % (ref 42.7–76)
NRBC BLD AUTO-RTO: 0 /100 WBC (ref 0–0.2)
PLATELET # BLD AUTO: 200 10*3/MM3 (ref 140–450)
PMV BLD AUTO: 10.6 FL (ref 6–12)
POTASSIUM SERPL-SCNC: 3.8 MMOL/L (ref 3.5–5.2)
RBC # BLD AUTO: 4.52 10*6/MM3 (ref 4.14–5.8)
SODIUM SERPL-SCNC: 145 MMOL/L (ref 136–145)
WBC NRBC COR # BLD AUTO: 8.55 10*3/MM3 (ref 3.4–10.8)

## 2024-11-04 PROCEDURE — 25010000002 ENOXAPARIN PER 10 MG: Performed by: SURGERY

## 2024-11-04 PROCEDURE — 80048 BASIC METABOLIC PNL TOTAL CA: CPT | Performed by: SURGERY

## 2024-11-04 PROCEDURE — 25010000002 PIPERACILLIN SOD-TAZOBACTAM PER 1 G: Performed by: STUDENT IN AN ORGANIZED HEALTH CARE EDUCATION/TRAINING PROGRAM

## 2024-11-04 PROCEDURE — 85025 COMPLETE CBC W/AUTO DIFF WBC: CPT | Performed by: SURGERY

## 2024-11-04 RX ORDER — TAMSULOSIN HYDROCHLORIDE 0.4 MG/1
2 CAPSULE ORAL DAILY
Qty: 180 CAPSULE | Refills: 3 | Status: SHIPPED | OUTPATIENT
Start: 2024-11-04 | End: 2025-10-30

## 2024-11-04 RX ADMIN — OXYCODONE HYDROCHLORIDE AND ACETAMINOPHEN 1 TABLET: 7.5; 325 TABLET ORAL at 10:47

## 2024-11-04 RX ADMIN — ENOXAPARIN SODIUM 40 MG: 100 INJECTION SUBCUTANEOUS at 09:12

## 2024-11-04 RX ADMIN — BUSPIRONE HYDROCHLORIDE 15 MG: 15 TABLET ORAL at 09:11

## 2024-11-04 RX ADMIN — FENTANYL 1 PATCH: 50 PATCH TRANSDERMAL at 09:11

## 2024-11-04 RX ADMIN — TIZANIDINE 4 MG: 4 TABLET ORAL at 10:47

## 2024-11-04 RX ADMIN — AMOXICILLIN AND CLAVULANATE POTASSIUM 1 TABLET: 875; 125 TABLET, FILM COATED ORAL at 09:11

## 2024-11-04 RX ADMIN — OXYCODONE HYDROCHLORIDE AND ACETAMINOPHEN 1 TABLET: 7.5; 325 TABLET ORAL at 04:14

## 2024-11-04 RX ADMIN — PIPERACILLIN AND TAZOBACTAM 3.38 G: 3; .375 INJECTION, POWDER, FOR SOLUTION INTRAVENOUS at 04:14

## 2024-11-04 RX ADMIN — PANTOPRAZOLE SODIUM 40 MG: 40 TABLET, DELAYED RELEASE ORAL at 06:34

## 2024-11-04 RX ADMIN — PROBIOTIC PRODUCT - TAB 1 TABLET: TAB at 09:11

## 2024-11-04 RX ADMIN — BACLOFEN 20 MG: 10 TABLET ORAL at 09:12

## 2024-11-04 RX ADMIN — ALPRAZOLAM 0.5 MG: 0.25 TABLET ORAL at 06:34

## 2024-11-04 RX ADMIN — GABAPENTIN 300 MG: 300 CAPSULE ORAL at 09:11

## 2024-11-04 NOTE — PLAN OF CARE
Problem: Adult Inpatient Plan of Care  Goal: Plan of Care Review  Outcome: Met  Flowsheets (Taken 11/4/2024 1338)  Progress: no change  Outcome Evaluation: Patient discharging home.  Plan of Care Reviewed With: patient  Goal: Patient-Specific Goal (Individualized)  Outcome: Met  Goal: Absence of Hospital-Acquired Illness or Injury  Outcome: Met  Intervention: Identify and Manage Fall Risk  Recent Flowsheet Documentation  Taken 11/4/2024 1300 by Karen Krueger, RN  Safety Promotion/Fall Prevention:   assistive device/personal items within reach   clutter free environment maintained   fall prevention program maintained   lighting adjusted   nonskid shoes/slippers when out of bed   room organization consistent   safety round/check completed  Taken 11/4/2024 1047 by Karen Krueger, RN  Safety Promotion/Fall Prevention:   assistive device/personal items within reach   clutter free environment maintained   fall prevention program maintained   lighting adjusted   nonskid shoes/slippers when out of bed   room organization consistent   safety round/check completed  Taken 11/4/2024 0910 by Karen Krueger, RN  Safety Promotion/Fall Prevention:   assistive device/personal items within reach   clutter free environment maintained   fall prevention program maintained   lighting adjusted   nonskid shoes/slippers when out of bed   room organization consistent   safety round/check completed  Taken 11/4/2024 0715 by Karen Krueger, RN  Safety Promotion/Fall Prevention:   assistive device/personal items within reach   clutter free environment maintained   fall prevention program maintained   lighting adjusted   nonskid shoes/slippers when out of bed   room organization consistent   safety round/check completed  Intervention: Prevent Infection  Recent Flowsheet Documentation  Taken 11/4/2024 1300 by Karen Krueger, RN  Infection Prevention:   cohorting utilized   environmental surveillance performed   equipment surfaces  disinfected   hand hygiene promoted   personal protective equipment utilized   rest/sleep promoted   single patient room provided  Taken 11/4/2024 1047 by Karen Krueger RN  Infection Prevention:   cohorting utilized   environmental surveillance performed   equipment surfaces disinfected   hand hygiene promoted   personal protective equipment utilized   rest/sleep promoted   single patient room provided  Taken 11/4/2024 0910 by Karen Krueger RN  Infection Prevention:   cohorting utilized   environmental surveillance performed   equipment surfaces disinfected   hand hygiene promoted   personal protective equipment utilized   rest/sleep promoted   single patient room provided  Taken 11/4/2024 0715 by Karen Krueger RN  Infection Prevention:   cohorting utilized   environmental surveillance performed   equipment surfaces disinfected   hand hygiene promoted   personal protective equipment utilized   rest/sleep promoted   single patient room provided  Goal: Optimal Comfort and Wellbeing  Outcome: Met  Intervention: Monitor Pain and Promote Comfort  Recent Flowsheet Documentation  Taken 11/4/2024 1047 by Karen Krueger RN  Pain Management Interventions:   care clustered   pain management plan reviewed with patient/caregiver   pain medication given   quiet environment facilitated  Intervention: Provide Person-Centered Care  Recent Flowsheet Documentation  Taken 11/4/2024 0910 by Karen Krueger RN  Trust Relationship/Rapport:   care explained   choices provided   emotional support provided   empathic listening provided   questions answered   questions encouraged   reassurance provided   thoughts/feelings acknowledged  Goal: Readiness for Transition of Care  Outcome: Met     Problem: Skin Injury Risk Increased  Goal: Skin Health and Integrity  Outcome: Met     Problem: Sepsis/Septic Shock  Goal: Optimal Coping  Outcome: Met  Goal: Absence of Bleeding  Outcome: Met  Goal: Blood Glucose Level Within Target  Range  Outcome: Met  Goal: Absence of Infection Signs and Symptoms  Outcome: Met  Intervention: Initiate Sepsis Management  Recent Flowsheet Documentation  Taken 11/4/2024 1300 by Karen Krueger RN  Infection Prevention:   cohorting utilized   environmental surveillance performed   equipment surfaces disinfected   hand hygiene promoted   personal protective equipment utilized   rest/sleep promoted   single patient room provided  Taken 11/4/2024 1047 by Karen Krueger RN  Infection Prevention:   cohorting utilized   environmental surveillance performed   equipment surfaces disinfected   hand hygiene promoted   personal protective equipment utilized   rest/sleep promoted   single patient room provided  Taken 11/4/2024 0910 by Karen Krueger RN  Infection Prevention:   cohorting utilized   environmental surveillance performed   equipment surfaces disinfected   hand hygiene promoted   personal protective equipment utilized   rest/sleep promoted   single patient room provided  Taken 11/4/2024 0715 by Karen Krueger RN  Infection Prevention:   cohorting utilized   environmental surveillance performed   equipment surfaces disinfected   hand hygiene promoted   personal protective equipment utilized   rest/sleep promoted   single patient room provided  Goal: Optimal Nutrition Delivery  Outcome: Met     Problem: Fall Injury Risk  Goal: Absence of Fall and Fall-Related Injury  Outcome: Met  Intervention: Identify and Manage Contributors  Recent Flowsheet Documentation  Taken 11/4/2024 0910 by Karen Krueger, RN  Medication Review/Management:   medications reviewed   high-risk medications identified  Intervention: Promote Injury-Free Environment  Recent Flowsheet Documentation  Taken 11/4/2024 1300 by Karen Krueger RN  Safety Promotion/Fall Prevention:   assistive device/personal items within reach   clutter free environment maintained   fall prevention program maintained   lighting adjusted   nonskid shoes/slippers  when out of bed   room organization consistent   safety round/check completed  Taken 11/4/2024 1047 by Karen Krueger, RN  Safety Promotion/Fall Prevention:   assistive device/personal items within reach   clutter free environment maintained   fall prevention program maintained   lighting adjusted   nonskid shoes/slippers when out of bed   room organization consistent   safety round/check completed  Taken 11/4/2024 0910 by Karen Krueger, RN  Safety Promotion/Fall Prevention:   assistive device/personal items within reach   clutter free environment maintained   fall prevention program maintained   lighting adjusted   nonskid shoes/slippers when out of bed   room organization consistent   safety round/check completed  Taken 11/4/2024 0715 by Karen Krueger, RN  Safety Promotion/Fall Prevention:   assistive device/personal items within reach   clutter free environment maintained   fall prevention program maintained   lighting adjusted   nonskid shoes/slippers when out of bed   room organization consistent   safety round/check completed     Problem: Palliative Care  Goal: Enhanced Quality of Life  Outcome: Met  Intervention: Maximize Comfort  Recent Flowsheet Documentation  Taken 11/4/2024 1047 by Karen Krueger RN  Pain Management Interventions:   care clustered   pain management plan reviewed with patient/caregiver   pain medication given   quiet environment facilitated  Taken 11/4/2024 0722 by Karen Krueger, RN  Oral Care: oral rinse provided   Goal Outcome Evaluation:  Plan of Care Reviewed With: patient        Progress: no change  Outcome Evaluation: Patient discharging home.

## 2024-11-04 NOTE — PLAN OF CARE
Goal Outcome Evaluation:  Plan of Care Reviewed With: patient           Outcome Evaluation: Patient aox4, verbally aggressive behaviors noted this shift with profanity, patient upset about percocet being changed to every 6 hours from every 4 hours. PRN percocet, xanax and zanaflex adminsitered this shift. Patient rested well after midnight. Vss. Continue poc.

## 2024-11-04 NOTE — PROGRESS NOTES
"Nutrition Services    Patient Name: Haider Jacobs  YOB: 1970  MRN: [id number]  Admission date: 10/29/2024      CLINICAL NUTRITION ASSESSMENT      Reason for Assessment  Follow Up   H&P:  Past Medical History:   Diagnosis Date    Anxiety     Chronic tetraplegia     Contracture of hand     LEFT HAND TIGHT HAND CONTRACTURE. RIGHT HAD CAN OPEN 30%.    Drug abuse     Hematuria     Neurogenic bladder     Spinal cord injury at C1-C4 level without spinal injury     Urinary retention         Current Problems:   Active Hospital Problems    Diagnosis     Paraplegia     Gluteal abscess         Nutrition/Diet History         Narrative   Patient receiving high protein diet and Tristian BID. Per chart graphics, patient with % meal intakes. Weight stable x 1 year. BM+ 11/01, stool softeners ordered prn. RD to continue current nutrition interventions. Plans for possible discharge today.      Anthropometrics        Current Height, Weight Height: 172.7 cm (67.99\")  Weight: 59 kg (130 lb 1.1 oz)   Current BMI Body mass index is 19.78 kg/m².   BMI Classification Normal range    % IBW used 61.56kg with -10% adj for SCI: 103%   Adjusted Body Weight (ABW)    Weight Hx  Wt Readings from Last 30 Encounters:   11/03/24 0600 59 kg (130 lb 1.1 oz)   11/02/24 1200 59.3 kg (130 lb 11.7 oz)   11/01/24 0600 59.1 kg (130 lb 4.7 oz)   10/30/24 0500 63.5 kg (140 lb)   10/29/24 1828 63.9 kg (140 lb 14 oz)   01/26/24 1254 56.7 kg (125 lb)   01/17/24 0817 56.7 kg (125 lb)   12/22/23 1254 60.1 kg (132 lb 7.9 oz)   11/21/23 0742 60.1 kg (132 lb 7.9 oz)   11/17/23 1013 63.5 kg (140 lb)   11/08/23 1102 60.3 kg (132 lb 15 oz)   10/19/23 1400 60.3 kg (133 lb)   08/08/23 1046 60.3 kg (133 lb)   07/18/23 0809 60.3 kg (133 lb)   07/11/23 0949 60.3 kg (133 lb)   06/17/23 1055 60.7 kg (133 lb 13.1 oz)   12/28/22 0933 63.4 kg (139 lb 12.4 oz)   03/04/22 1000 77.1 kg (170 lb)   02/17/22 1043 77.1 kg (170 lb)   02/04/22 1601 84.4 kg (186 lb 1.1 oz) "   08/26/21 1846 85.8 kg (189 lb 2.5 oz)   06/04/20 0000 95.3 kg (210 lb)          Wt Change Observation +15# past 9 mo     Estimated/Assessed Needs  Estimated Needs based on: Current Body Weight - Noted C-level SCI with tetraplegia, did not subtract kcals d/t need for increased energy to assist in wound healing.       Energy Requirements 25-30 kcal/kg    EST Needs (kcal/day) 0611-9358       Protein Requirements 1.2-1.5 g.kg   EST Daily Needs (g/day) 77-96       Fluid Requirements SCI    Estimated Needs (mL/day) 2-3L/day     Labs/Medications         Pertinent Labs Reviewed.   Results from last 7 days   Lab Units 11/04/24  0544 11/03/24  0701 11/02/24  0544 10/30/24  0539 10/29/24  1805   SODIUM mmol/L 145 144 140   < > 137   POTASSIUM mmol/L 3.8 4.0 3.6   < > 4.3   CHLORIDE mmol/L 110* 112* 110*   < > 99   CO2 mmol/L 24.4 24.2 21.6*   < > 27.3   BUN mg/dL 39* 28* 10   < > 16   CREATININE mg/dL 1.53* 1.38* 0.70*   < > 0.84   CALCIUM mg/dL 9.4 8.9 8.6   < > 8.9   BILIRUBIN mg/dL  --   --   --   --  0.2   ALK PHOS U/L  --   --   --   --  88   ALT (SGPT) U/L  --   --   --   --  8   AST (SGOT) U/L  --   --   --   --  13   GLUCOSE mg/dL 88 114* 118*   < > 120*    < > = values in this interval not displayed.     Results from last 7 days   Lab Units 11/04/24  0544 11/02/24  0544 11/01/24  0532   PHOSPHORUS mg/dL  --   --  2.6   HEMOGLOBIN g/dL 12.7*   < > 12.5*   HEMATOCRIT % 39.5   < > 38.0    < > = values in this interval not displayed.     COVID19   Date Value Ref Range Status   10/29/2024 Not Detected Not Detected - Ref. Range Final     Lab Results   Component Value Date    HGBA1C 4.6 12/26/2019         Pertinent Medications Reviewed.     Malnutrition Severity Assessment              Nutrition Diagnosis         Nutrition Dx Problem 1 Increased nutrient needs related to increased protein demand as evidenced by impaired skin integrity.     Nutrition Intervention           Current Nutrition Orders & Evaluation of Intake        Current PO Diet Diet: High Protein, Regular/House; Fluid Consistency: Thin (IDDSI 0)   Supplement Orders Placed This Encounter      Dietary Nutrition Supplements Boost VHC; Strawberry      Dietary Nutrition Supplements Tristian; orange           Nutrition Intervention/Prescription        Continue High Protein diet to assist with wound healing  Continue Tristian BID        Medical Nutrition Therapy/Nutrition Education          Learner     Readiness Patient  Education not indicated at this time     Method     Response N/A  N/A     Monitor/Evaluation        Monitor PO intake, Supplement intake, Skin status     Nutrition Discharge Plan         Recommend to continue oral nutrition supplements on discharge.      Electronically signed by:  Jerica Mosqueda RD  11/04/24 14:59 EST

## 2024-11-04 NOTE — PROGRESS NOTES
"[facility] Clinical Pharmacy Services: Vancomycin Monitoring Note    Haider Jacobs is a 53 y.o. male who is on day  of pharmacy to dose vancomycin for Skin and Soft Tissue.    Previous Vancomycin Dose: 1250 mg IV every 12 hours  Imaging Reviewed?: Yes    Updated Cultures and Sensitivities:   10/30 Anerobic culture, wound- Bacteroides fragilis group  10/30 Wound culture- Light growth morganella morganii ssp sibonii, mod growth streptococcus anginosus  10/29 Blood- NGTD  10/29 Wound culture- Providencia stuartii, morganella ssp, streptococcus anginosus     Vitals/Labs  Ht: 172.7 cm (67.99\"); Wt: 59 kg (130 lb 1.1 oz)   Temp (24hrs), Av.2 °F (36.8 °C), Min:97.5 °F (36.4 °C), Max:98.7 °F (37.1 °C)   Estimated Creatinine Clearance: 45.5 mL/min (A) (by C-G formula based on SCr of 1.53 mg/dL (H)).     Results from last 7 days   Lab Units 24  0544 24  0701 24  0751 24  0544 24  0532 10/31/24  0533   VANCOMYCIN RM mcg/mL  --   --   --   --   --  9.80   VANCOMYCIN TR mcg/mL  --   --  20.61*  --   --   --    CREATININE mg/dL 1.53* 1.38*  --  0.70*   < > 0.78   WBC 10*3/mm3 8.55 7.67  --  6.66   < > 9.42    < > = values in this interval not displayed.     Assessment/Plan  Current Vancomycin Dose:  1250 mg IV every 24 hours; which provides the following predicted parameters:    Regimen: 1250 mg IV every 24 hours.  Start time: 22:00 on 2024  Exposure target: AUC24 (range)400-600 mg/L.hr   AUC24,ss: 537 mg/L.hr  Probability of AUC24 > 400: 99 %  Ctrough,ss: 17 mg/L  Probability of Ctrough,ss > 20: 22 %  Probability of nephrotoxicity (Lodise JOVANNI ): 13 %    We will continue to monitor patient changes and renal function     Thank you for involving pharmacy in this patient's care. Please contact pharmacy with any questions or concerns.    Ever Arita  Clinical Pharmacist  "
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
## 2024-11-04 NOTE — SIGNIFICANT NOTE
Wound Eval / Progress Noted    [facility]     Patient Name: Haider Jacobs  : 1970  MRN: [id number]  Today's Date: 2024  Onset of Illness/Injury or Date of Surgery: 10/29/24              Admit Date: 10/29/2024    Visit Dx:    ICD-10-CM ICD-9-CM   1. Skin ulcer of sacrum, unspecified ulcer stage  L98.429 707.8   2. Gluteal abscess  L02.31 682.5   3. Difficulty in walking  R26.2 719.7   4. Benign prostatic hyperplasia with lower urinary tract symptoms, symptom details unspecified  N40.1 600.01         Gluteal abscess    Paraplegia        Past Medical History:   Diagnosis Date    Anxiety     Chronic tetraplegia     Contracture of hand     LEFT HAND TIGHT HAND CONTRACTURE. RIGHT HAD CAN OPEN 30%.    Drug abuse     Hematuria     Neurogenic bladder     Spinal cord injury at C1-C4 level without spinal injury     Urinary retention         Past Surgical History:   Procedure Laterality Date    ANKLE SURGERY      CERVICAL FUSION      C2-C7    DEBRIDEMENT OF ISCHIAL ULCER/BUTTOCKS WOUND Left 10/30/2024    Procedure: DEBRIDEMENT OF ISCHIAL ULCER/BUTTOCKS WOUND: clean and remove unhealthy tissue from the wound using surgical instruments;  Surgeon: Saeid Quesada MD;  Location: [facility];  Service: General;  Laterality: Left;    FRACTURE SURGERY      ARM    URETEROSCOPY LASER LITHOTRIPSY WITH STENT INSERTION Bilateral 2023    Procedure: CYSTOSCOPY BILATERAL RETROGRADE PYELOGRAM BLADDER STONE EXTRACTION;  Surgeon: Jennifer Bowman MD;  Location: [facility];  Service: Urology;  Laterality: Bilateral;         Physical Assessment:  Wound 24 1004 Left lateral ankle (Active)   Wound Image   24 1330   Base dry;brown 24 1330   Periwound intact 24 1330   Periwound Temperature warm 24 1330   Periwound Skin Turgor firm 24 1330   Wound Length (cm) 1.4 cm 24 1330   Wound Width (cm) 0.6 cm 24 1330   Wound Surface Area (cm^2) 0.84 cm^2 24 1330   Drainage  Characteristics/Odor serosanguineous 11/04/24 1330   Drainage Amount scant 11/04/24 1330   Care, Wound cleansed with;sterile normal saline 11/04/24 1330   Dressing Care dressing applied;hydrofiber;silver impregnated;silicone border foam 11/04/24 1330       Wound 10/10/24 1738 Right gluteal Pressure Injury (Active)   Wound Image   11/04/24 1330   Pressure Injury Stage 3 11/04/24 1330   Base yellow;red 11/04/24 1330   Red (%), Wound Tissue Color 50 11/04/24 1330   Yellow (%), Wound Tissue Color 50 11/04/24 1330   Periwound intact 11/04/24 1330   Edges open 11/04/24 1330   Wound Length (cm) 1.2 cm 11/04/24 1330   Wound Width (cm) 1 cm 11/04/24 1330   Wound Depth (cm) 0.2 cm 11/04/24 1330   Wound Surface Area (cm^2) 1.2 cm^2 11/04/24 1330   Wound Volume (cm^3) 0.24 cm^3 11/04/24 1330   Drainage Characteristics/Odor yellow 11/04/24 1330   Drainage Amount scant 11/04/24 1330   Care, Wound cleansed with;sterile normal saline 11/04/24 1330   Dressing Care dressing applied;border dressing;foam;hydrofiber;silver impregnated;silicone border foam 11/04/24 1330       Wound 10/30/24 1122 Left gluteal (Active)   Wound Image   11/04/24 1330   Pressure Injury Stage 4 11/04/24 1330   Dressing Appearance intact 11/04/24 1330   Base red 11/04/24 1330   Red (%), Wound Tissue Color 90 11/04/24 1330   Periwound pale white 11/04/24 1330   Periwound Temperature warm 11/04/24 1330   Periwound Skin Turgor soft 11/04/24 1330   Edges irregular;rolled/closed 11/04/24 1330   Wound Length (cm) 2.6 cm 11/04/24 1330   Wound Width (cm) 2.2 cm 11/04/24 1330   Wound Depth (cm) 2.5 cm 11/04/24 1330   Wound Surface Area (cm^2) 5.72 cm^2 11/04/24 1330   Wound Volume (cm^3) 14.3 cm^3 11/04/24 1330   Drainage Characteristics/Odor serosanguineous 11/04/24 1330   Drainage Amount scant 11/04/24 1330   Care, Wound cleansed with;sterile normal saline 11/04/24 1330   Dressing Care dressing applied;other (see comments) 11/04/24 1330           NPWT (Negative  Pressure Wound Therapy) 11/01/24 1655 Left Ischium (Active)   Therapy Setting continuous therapy 11/04/24 1330   Dressing foam, black 11/04/24 1330   Pressure Setting 100 mmHg 11/04/24 1330   Sponges Inserted 1 11/04/24 1330   Sponges Removed 1 11/04/24 1330   Finger sweep complete Yes 11/04/24 1330        Wound Check / Follow-up: Follow-up assessment performed on Mr. Jacobs.  He is a 53-year-old male admitted to the hospital with gluteal abscess which was subsequently debrided.  His David score this morning was 13.  Mr. Jacobs is to be discharged today and with discharge planning it has been determined that he will have a home VAC system and come to outpatient for follow-up with that.  Mr. Jacobs and his wife Jamee were available for teaching on the use of the home VAC.  Teaching checklist was completed questions were answered patient and his wife indicate they understand how the VAC will work and what best to do if the VAC system does not work.    Assessment of wounds reveal left gluteal wound which has recently been debrided.  The wound bed is red, and the tissue looks healthy.  On his right gluteus he has a second pressure ulcer smaller which is 50% granulation 50% yellow slough.    Treatment:  Right gluteal wound and lateral heel wound-cleanse with normal saline, pat dry.  Apply silver impregnated Hydrofiber (Aquacel Ag) to wound bed and secure with silicone bordered foam (Optifoam).  Change dressing daily and as needed.  Left gluteal wound-wound cleansed with normal saline.  1 piece of black foam inserted into wound and secured a bridge was established to anterior hip and TRAC pad applied there.  Home VAC set at 100 mmHg continuous with low intensity.  Patient to be seen next in outpatient on Wednesday at 9 AM.    Patient and family have no questions at this time, but they have been given resources to call if any questions, or concerns.  They will also be followed up by the wound team in  outpatient.    Impression: Healing gluteal wounds    Short term goals: Wound healing, reduced potential for infection    Jerica Alcantara RN    11/4/2024    15:34 EST

## 2024-11-05 NOTE — OUTREACH NOTE
Prep Survey      Flowsheet Row Responses   [facility] facility patient discharged from? [facility]   Is LACE score < 7 ? No   Eligibility Readm Mgmt   Discharge diagnosis Sacral ulcer, DEBRIDEMENT OF ISCHIAL ULCER/BUTTOCKS WOUND: clean and remove unhealthy tissue from the wound using surgical instruments, quadriplegia   Does the patient have one of the following disease processes/diagnoses(primary or secondary)? General Surgery   Does the patient have Home health ordered? No   Is there a DME ordered? Yes   What DME was ordered? wound vac   General alerts for this patient quadriplegia   Prep survey completed? Yes            Lian RODRIGUEZ - Registered Nurse

## 2024-11-06 ENCOUNTER — HOSPITAL ENCOUNTER (OUTPATIENT)
Dept: INFUSION THERAPY | Facility: HOSPITAL | Age: 54
Discharge: HOME | End: 2024-11-06
Admitting: NURSE PRACTITIONER
Payer: COMMERCIAL

## 2024-11-06 VITALS
SYSTOLIC BLOOD PRESSURE: 102 MMHG | TEMPERATURE: 98.5 F | DIASTOLIC BLOOD PRESSURE: 79 MMHG | HEART RATE: 94 BPM | OXYGEN SATURATION: 96 % | RESPIRATION RATE: 20 BRPM

## 2024-11-06 DIAGNOSIS — L02.31 GLUTEAL ABSCESS: Primary | ICD-10-CM

## 2024-11-06 PROCEDURE — 97605 NEG PRS WND THER DME<=50SQCM: CPT

## 2024-11-06 NOTE — SIGNIFICANT NOTE
Wound Eval / Progress Noted     [facility]     Patient Name: Haider Jacobs  : 1970  MRN: [id number]  Today's Date: 2024                 Admit Date: 2024    Visit Dx:    ICD-10-CM ICD-9-CM   1. Gluteal abscess  L02.31 682.5         * No active hospital problems. *        Past Medical History:   Diagnosis Date    Anxiety     Chronic tetraplegia     Contracture of hand     LEFT HAND TIGHT HAND CONTRACTURE. RIGHT HAD CAN OPEN 30%.    Drug abuse     Hematuria     Neurogenic bladder     Spinal cord injury at C1-C4 level without spinal injury     Urinary retention         Past Surgical History:   Procedure Laterality Date    ANKLE SURGERY      CERVICAL FUSION      C2-C7    DEBRIDEMENT OF ISCHIAL ULCER/BUTTOCKS WOUND Left 10/30/2024    Procedure: DEBRIDEMENT OF ISCHIAL ULCER/BUTTOCKS WOUND: clean and remove unhealthy tissue from the wound using surgical instruments;  Surgeon: Saeid Quesada MD;  Location: [facility];  Service: General;  Laterality: Left;    FRACTURE SURGERY      ARM    URETEROSCOPY LASER LITHOTRIPSY WITH STENT INSERTION Bilateral 2023    Procedure: CYSTOSCOPY BILATERAL RETROGRADE PYELOGRAM BLADDER STONE EXTRACTION;  Surgeon: Jennifer Bowman MD;  Location: [facility];  Service: Urology;  Laterality: Bilateral;         Physical Assessment:  Wound 10/10/24 1738 Right gluteal Pressure Injury (Active)   Wound Image   24 1450   Dressing Appearance dry;intact 24 1450   Closure None 24 1450   Base moist;red;slough;yellow 24 1450   Periwound dry;intact;pink 24 1450   Periwound Temperature warm 24 1450   Periwound Skin Turgor soft 24 1450   Edges open 24 1450   Drainage Characteristics/Odor serosanguineous 24 1450   Drainage Amount small 24 1450   Care, Wound cleansed with;sterile normal saline 24 1450   Dressing Care dressing applied;hydrofiber;silver impregnated;silicone border foam 24 1450   Periwound  Care absorptive dressing applied 11/06/24 1450       Wound 10/30/24 1122 Left gluteal (Active)   Wound Image   11/06/24 1450   Dressing Appearance dry;intact 11/06/24 1450   Closure None 11/06/24 1450   Base moist;red;granulating;yellow;slough 11/06/24 1450   Periwound dry;intact;pink 11/06/24 1450   Periwound Temperature warm 11/06/24 1450   Periwound Skin Turgor soft 11/06/24 1450   Edges open 11/06/24 1450   Wound Length (cm) 2.4 cm 11/06/24 1450   Wound Width (cm) 2.1 cm 11/06/24 1450   Wound Depth (cm) 3.1 cm 11/06/24 1450   Wound Surface Area (cm^2) 5.04 cm^2 11/06/24 1450   Wound Volume (cm^3) 15.624 cm^3 11/06/24 1450   Undermining [Depth (cm)/Location] from 6 o'clock to 12 o'clock with maximum depth of 1.8 cm at 10 o'clock 11/06/24 1450   Drainage Characteristics/Odor serosanguineous 11/06/24 1450   Drainage Amount small 11/06/24 1450   Care, Wound cleansed with;irrigated with;sterile normal saline;negative pressure wound therapy 11/06/24 1450   Dressing Care dressing applied;foam;transparent film 11/06/24 1450   Periwound Care absorptive dressing applied;barrier film applied 11/06/24 1450       NPWT (Negative Pressure Wound Therapy) 11/01/24 1655 Left Ischium (Active)   Therapy Setting continuous therapy 11/06/24 1450   Dressing foam, black;transparent dressing 11/06/24 1450   Pressure Setting 100 mmHg 11/06/24 1450   Sponges Inserted 2 11/06/24 1450   Sponges Removed 3 11/06/24 1450   Finger sweep complete Yes 11/06/24 1450      Wound Check / Follow-up: Patient seen today for wound evaluation and treatment in outpatient nursing services.  Patient is awake, alert and oriented at time of visit.  Patient is accompanied by spouse today.  Patient was recently discharged from the hospital.  Patient underwent surgery for debridement of left buttock wound on 10/30/2024 with general surgery.  Wound VAC was placed on 11/2/2024.  Home wound VAC was placed prior to discharge on 11/4/2024.  Patient presents today with  intact wound VAC dressing and wound VAC that is functioning properly without alarms.    Removed wound VAC dressing with no issues.  Wound base to left ischium with moist, red granulating tissue and small amount of moist, yellow sloughing tissue.  Periwound tissue is pink, dry and intact.  Cleansed and irrigated wound with normal saline.  Applied to skin barrier wipe to periwound tissue.  Filled wound with 1 piece of black foam then secured with transparent drape.  Additional piece of black foam added as bridge to left hip then secured with transparent drape.  VAC attached, foam compressed, seal obtained with no signs of lifting or leaking noted.  Patient tolerated fair.  Suction is set at 100 mmHg, as patient states this is the maximum amount of suction he can tolerate.  Patient continues to complain of pain and states that he feels as if his pain is not managed.  Patient does report that he has a fentanyl patch in place.  Encouraged patient to reach out to surgeons office if he feels that he needs additional pain medication prescription.  Patient verbalized understanding.    Stage III pressure injury noted to right gluteal aspect is showing signs of improvement.  Moist, red tissue is present to wound base along with a small amount of yellow sloughing tissue.  Cleansed wound with normal saline and gauze.  Periwound tissue is pink, dry and intact.  Applied a silver impregnated Hydrofiber and secured with silicone border dressing.  Recommending daily dressing changes.  Patient's spouse states that she has been changing dressings at home and verbalized understanding of dressing changes with silver impregnated Hydrofiber and silicone border dressing.  Supplies given to patient spouse.        Impression: Stage III pressure injury to left ischium, status post surgical debridement, with closure by secondary intent.  Stage III pressure injury to right gluteal aspect      Short term goals: Regain skin integrity, skin  protection, daily dressing changes, negative pressure wound therapy, 3 times weekly dressing changes on MWF.      Rhonda Oliva RN    11/6/2024    16:02 EST

## 2024-11-07 ENCOUNTER — READMISSION MANAGEMENT (OUTPATIENT)
Dept: CALL CENTER | Facility: HOSPITAL | Age: 54
End: 2024-11-07
Payer: COMMERCIAL

## 2024-11-07 NOTE — OUTREACH NOTE
General Surgery Week 1 Survey      Flowsheet Row Responses   [facility] facility patient discharged from? [facility]   Does the patient have one of the following disease processes/diagnoses(primary or secondary)? General Surgery   Week 1 attempt successful? No   Unsuccessful attempts Attempt 1            Irina Boateng Registered Nurse

## 2024-11-08 ENCOUNTER — HOSPITAL ENCOUNTER (OUTPATIENT)
Dept: INFUSION THERAPY | Facility: HOSPITAL | Age: 54
Discharge: HOME | End: 2024-11-08
Payer: COMMERCIAL

## 2024-11-08 VITALS
SYSTOLIC BLOOD PRESSURE: 112 MMHG | HEART RATE: 90 BPM | TEMPERATURE: 98.3 F | RESPIRATION RATE: 18 BRPM | DIASTOLIC BLOOD PRESSURE: 80 MMHG | OXYGEN SATURATION: 98 %

## 2024-11-08 DIAGNOSIS — L02.31 GLUTEAL ABSCESS: Primary | ICD-10-CM

## 2024-11-08 PROCEDURE — G0463 HOSPITAL OUTPT CLINIC VISIT: HCPCS

## 2024-11-11 ENCOUNTER — HOSPITAL ENCOUNTER (OUTPATIENT)
Dept: INFUSION THERAPY | Facility: HOSPITAL | Age: 54
Discharge: HOME | End: 2024-11-11
Admitting: NURSE PRACTITIONER
Payer: COMMERCIAL

## 2024-11-11 VITALS
HEART RATE: 84 BPM | OXYGEN SATURATION: 95 % | DIASTOLIC BLOOD PRESSURE: 75 MMHG | SYSTOLIC BLOOD PRESSURE: 106 MMHG | TEMPERATURE: 98.2 F | RESPIRATION RATE: 20 BRPM

## 2024-11-11 DIAGNOSIS — L02.31 GLUTEAL ABSCESS: Primary | ICD-10-CM

## 2024-11-11 PROCEDURE — 97605 NEG PRS WND THER DME<=50SQCM: CPT

## 2024-11-11 NOTE — SIGNIFICANT NOTE
Wound Eval / Progress Noted     [facility]     Patient Name: Haider Jacobs  : 1970  MRN: [id number]  Today's Date: 2024                 Admit Date: 2024    Visit Dx:    ICD-10-CM ICD-9-CM   1. Gluteal abscess  L02.31 682.5         * No active hospital problems. *        Past Medical History:   Diagnosis Date    Anxiety     Chronic tetraplegia     Contracture of hand     LEFT HAND TIGHT HAND CONTRACTURE. RIGHT HAD CAN OPEN 30%.    Drug abuse     Hematuria     Neurogenic bladder     Spinal cord injury at C1-C4 level without spinal injury     Urinary retention         Past Surgical History:   Procedure Laterality Date    ANKLE SURGERY      CERVICAL FUSION      C2-C7    DEBRIDEMENT OF ISCHIAL ULCER/BUTTOCKS WOUND Left 10/30/2024    Procedure: DEBRIDEMENT OF ISCHIAL ULCER/BUTTOCKS WOUND: clean and remove unhealthy tissue from the wound using surgical instruments;  Surgeon: Saeid Quesada MD;  Location: [facility];  Service: General;  Laterality: Left;    FRACTURE SURGERY      ARM    URETEROSCOPY LASER LITHOTRIPSY WITH STENT INSERTION Bilateral 2023    Procedure: CYSTOSCOPY BILATERAL RETROGRADE PYELOGRAM BLADDER STONE EXTRACTION;  Surgeon: Jennifer Bowman MD;  Location: [facility];  Service: Urology;  Laterality: Bilateral;         Physical Assessment:  Wound 10/10/24 1738 Right gluteal Pressure Injury (Active)   Wound Image   24 1524   Dressing Appearance dry;intact 24 1524   Closure None 24 1524   Base moist;red;slough;yellow 24 1524   Periwound dry;intact;pink 24 1524   Periwound Temperature warm 24 1524   Periwound Skin Turgor soft 24 1524   Edges open 24 1524   Drainage Characteristics/Odor serosanguineous 24 1524   Drainage Amount scant 24 1524   Care, Wound cleansed with;sterile normal saline 24 1524   Dressing Care dressing applied;hydrofiber;silver impregnated;silicone border foam 24 1524    Periwound Care absorptive dressing applied 11/11/24 1524       Wound 10/30/24 1122 Left gluteal (Active)   Wound Image   11/11/24 1524   Dressing Appearance dry;intact 11/11/24 1524   Closure None 11/11/24 1524   Base moist;red;granulating 11/11/24 1524   Periwound dry;intact;pink 11/11/24 1524   Periwound Temperature warm 11/11/24 1524   Periwound Skin Turgor soft 11/11/24 1524   Edges open 11/11/24 1524   Wound Length (cm) 2.4 cm 11/11/24 1524   Wound Width (cm) 2 cm 11/11/24 1524   Wound Depth (cm) 3 cm 11/11/24 1524   Wound Surface Area (cm^2) 4.8 cm^2 11/11/24 1524   Wound Volume (cm^3) 14.4 cm^3 11/11/24 1524   Undermining [Depth (cm)/Location] from 6 o'clock to 12 o'clock with maximum depth of 1.7 cm at 10 o'clock 11/11/24 1524   Drainage Characteristics/Odor serosanguineous 11/11/24 1524   Drainage Amount small 11/11/24 1524   Care, Wound cleansed with;irrigated with;sterile normal saline;negative pressure wound therapy 11/11/24 1524   Dressing Care dressing applied;foam;transparent film;hydrocolloid 11/11/24 1524   Periwound Care absorptive dressing applied 11/11/24 1524       NPWT (Negative Pressure Wound Therapy) 11/01/24 1655 Left Ischium (Active)   Therapy Setting continuous therapy 11/11/24 1524   Dressing foam, black;transparent dressing 11/11/24 1524   Pressure Setting 100 mmHg 11/11/24 1524   Sponges Inserted 2 11/11/24 1524   Sponges Removed 3 11/11/24 1524   Finger sweep complete Yes 11/11/24 1524      Wound Check / Follow-up: Patient seen today for wound follow-up and wound VAC dressing change in outpatient nursing services. Patient is awake, alert, and oriented at time of visit. Patient is accompanied by spouse. Patient and spouse deny any issues with wound VAC or dressing over the weekend. Wound VAC is functioning properly without alarms upon assessment     Removed wound VAC dressing with no issues. Wound base to left ischium with red, moist granular tissue. Periwound tissue is pink, dry and  intact. Cleansed and irrigated wound with normal saline. Filled wound with one piece of black foam then secured with transparent drape. Strips of hydrocolloid dressing added to medial margin of dressing to aid in seal / securement. Additional piece of foam added as bridge to left hip then secured with transparent drape. VAC attached, foam compressed, seal obtained with no signs of lifting or leaking. Suction remains at 100 mmHg for patient tolerance. Patient continues to complain of pain. He states he did not call the surgeon's office about this. I encouraged him to contact surgeon's office if he feels he needs additional pain medication / pain is not managed. Patient verbalized understanding.    Stage III pressure injury remains to right gluteal aspect. Moist, red tissue is present to wound base along with small amount of thin, yellow sloughing tissue. Periwound tissue is dry and intact. Cleansed wound with normal saline and gauze. Applied silver impregnated hydrofiber then secured with silicone border dressing. Recommending to continue daily dressing changes.    Patient and spouse express concerns of increased weakness for patient. Patient states he was walking with walker prior to recent hospitalization. Patient can minimally pivot upon assessment today. Patient's spouse expressing concerns about transporting patient to wound VAC dressing change appointments and caring for him at home with increased weakness. Patient reports he wishes to go to inpatient rehabilitation. Contacted  who contacted [facility]. Patient will need to come back to emergency department if he and spouse feel unsafe caring for him at home and referrals can be made for inpatient rehabilitation per .  also states patient's insurance will pay for [facility] transportation, they just need to call three days in advance. Notified patient and spouse of all of this information.      Impression: Stage III pressure  injury to left ischium with closure by secondary intent. Stage III pressure injury to right gluteal aspect.      Short term goals: Regain skin integrity, skin protection, daily dressing changes, negative pressure wound therapy, 3x weekly dressing changes on MWF.      Rhonda Oliva RN    11/11/2024    16:30 EST

## 2024-11-12 ENCOUNTER — READMISSION MANAGEMENT (OUTPATIENT)
Dept: CALL CENTER | Facility: HOSPITAL | Age: 54
End: 2024-11-12
Payer: COMMERCIAL

## 2024-11-12 NOTE — OUTREACH NOTE
General Surgery Week 1 Survey      Flowsheet Row Responses   [facility] patient discharged from? [facility]   Does the patient have one of the following disease processes/diagnoses(primary or secondary)? General Surgery   Week 1 attempt successful? Yes   Call start time 0841   Call end time 0855   Is patient permission given to speak with other caregiver? Yes   Person spoke with today (if not patient) and relationship Jamee-spouse.   Meds reviewed with patient/caregiver? Yes   Is the patient having any side effects they believe may be caused by any medication additions or changes? No   Does the patient have all medications related to this admission filled (includes all antibiotics, pain medications, etc.) Yes   Is the patient taking all medications as directed (includes completed medication regime)? Yes   Does the patient have a follow up appointment scheduled with their surgeon? Yes   Has the patient kept scheduled appointments due by today? Yes   Comments Telehealth appt with PCP today.   Has home health visited the patient within 72 hours of discharge? N/A   Home health comments Spouse states patient getting weaker due to being in bed from sacral ulcer, and will discuss HH with PCP at telehealth appt today.   Has all DME been delivered? Yes   DME comments Wound vac in place-managed by wound care clinic.   Psychosocial issues? Yes   Psychosocial comments Spouse states having difficulty getting patient to wound care clinic appts due to quadriplegia/weakness. States patient is thinking about going to rehab, and also considering HH services. States will discuss with PCP at telehealth appt today. States may want referral to [facility] next week if further help needed.   Did the patient receive a copy of their discharge instructions? Yes   Nursing interventions Reviewed instructions with patient   What is the patient's perception of their health status since discharge? Improving   Nursing interventions Nurse provided  patient education   Is the patient /caregiver able to teach back basic post-op care? Practice 'cough and deep breath', Drive as instructed by MD in discharge instructions, Take showers only when approved by MD-sponge bathe until then, No tub bath, swimming, or hot tub until instructed by MD, Keep incision areas clean,dry and protected, Do not remove steri-strips, Lifting as instructed by MD in discharge instructions   Is the patient/caregiver able to teach back signs and symptoms of incisional infection? Increased redness, swelling or pain at the incisonal site, Increased drainage or bleeding, Incisional warmth, Pus or odor from incision, Fever   Is the patient/caregiver able to teach back steps to recovery at home? Set small, achievable goals for return to baseline health, Practice good oral hygiene, Eat a well-balance diet   Is the patient/caregiver able to teach back the hierarchy of who to call/visit for symptoms/problems? PCP, Specialist, Home health nurse, Urgent Care, ED, 911 Yes   Week 1 call completed? Yes   Is the patient interested in additional calls from an ambulatory ? No   Would this patient benefit from a Referral to [facility] Work? No   Wrap up additional comments Spouse states patient's wound is slowly healing. Wound vac in place. States patient is getting weaker due to having to be in the bed with wound vac. States patient is considering rehab, but has not decided. States will discuss with PCP today. Denies any other needs today.   Call end time [id number]            Berenice BRAGG - Registered Nurse

## 2024-11-13 ENCOUNTER — HOSPITAL ENCOUNTER (OUTPATIENT)
Dept: INFUSION THERAPY | Facility: HOSPITAL | Age: 54
Discharge: HOME | End: 2024-11-13
Admitting: NURSE PRACTITIONER
Payer: COMMERCIAL

## 2024-11-13 VITALS
HEART RATE: 71 BPM | RESPIRATION RATE: 20 BRPM | TEMPERATURE: 98.5 F | DIASTOLIC BLOOD PRESSURE: 70 MMHG | SYSTOLIC BLOOD PRESSURE: 105 MMHG | OXYGEN SATURATION: 96 %

## 2024-11-13 DIAGNOSIS — L02.31 GLUTEAL ABSCESS: Primary | ICD-10-CM

## 2024-11-13 PROCEDURE — 97605 NEG PRS WND THER DME<=50SQCM: CPT

## 2024-11-13 PROCEDURE — 97606 NEG PRS WND THER DME>50 SQCM: CPT

## 2024-11-15 ENCOUNTER — TELEPHONE (OUTPATIENT)
Dept: WOUND CARE | Facility: HOSPITAL | Age: 54
End: 2024-11-15
Payer: COMMERCIAL

## 2024-11-15 NOTE — TELEPHONE ENCOUNTER
Wife Jamee called and stated patient is too weak to make it to his appt.  Worried about VAC not being changed.  Spoke with Indu BOLES and per her~ instructions for wife is to do NS wet to dry daily till appointment Monday on ONS.

## 2024-11-18 ENCOUNTER — HOSPITAL ENCOUNTER (OUTPATIENT)
Dept: INFUSION THERAPY | Facility: HOSPITAL | Age: 54
Discharge: HOME | End: 2024-11-18
Admitting: NURSE PRACTITIONER
Payer: COMMERCIAL

## 2024-11-18 VITALS
DIASTOLIC BLOOD PRESSURE: 70 MMHG | SYSTOLIC BLOOD PRESSURE: 126 MMHG | TEMPERATURE: 97.9 F | OXYGEN SATURATION: 97 % | RESPIRATION RATE: 20 BRPM | HEART RATE: 73 BPM

## 2024-11-18 DIAGNOSIS — L02.31 GLUTEAL ABSCESS: Primary | ICD-10-CM

## 2024-11-18 PROCEDURE — 97605 NEG PRS WND THER DME<=50SQCM: CPT

## 2024-11-18 NOTE — SIGNIFICANT NOTE
Wound Eval / Progress Noted     [facility]     Patient Name: Haider Jacobs  : 1970  MRN: [id number]  Today's Date: 2024                 Admit Date: 2024    Visit Dx:    ICD-10-CM ICD-9-CM   1. Gluteal abscess  L02.31 682.5         * No active hospital problems. *        Past Medical History:   Diagnosis Date    Anxiety     Chronic tetraplegia     Contracture of hand     LEFT HAND TIGHT HAND CONTRACTURE. RIGHT HAD CAN OPEN 30%.    Drug abuse     Hematuria     Neurogenic bladder     Spinal cord injury at C1-C4 level without spinal injury     Urinary retention         Past Surgical History:   Procedure Laterality Date    ANKLE SURGERY      CERVICAL FUSION      C2-C7    DEBRIDEMENT OF ISCHIAL ULCER/BUTTOCKS WOUND Left 10/30/2024    Procedure: DEBRIDEMENT OF ISCHIAL ULCER/BUTTOCKS WOUND: clean and remove unhealthy tissue from the wound using surgical instruments;  Surgeon: Saeid Quesada MD;  Location: [facility];  Service: General;  Laterality: Left;    FRACTURE SURGERY      ARM    URETEROSCOPY LASER LITHOTRIPSY WITH STENT INSERTION Bilateral 2023    Procedure: CYSTOSCOPY BILATERAL RETROGRADE PYELOGRAM BLADDER STONE EXTRACTION;  Surgeon: Jennifer Bowman MD;  Location: [facility];  Service: Urology;  Laterality: Bilateral;         Physical Assessment:  Wound 10/10/24 1738 Right gluteal Pressure Injury (Active)   Wound Image   24 1450   Dressing Appearance dry;intact 24 1450   Closure None 24 1450   Base moist;red;slough;yellow 24 1450   Periwound dry;intact;pink 24 1450   Periwound Temperature warm 24 1450   Periwound Skin Turgor soft 24 1450   Edges open 24 1450   Drainage Characteristics/Odor serosanguineous 24 1450   Drainage Amount small 24 1450   Care, Wound cleansed with;irrigated with;sterile normal saline 24 1450   Dressing Care dressing applied;hydrofiber;silver impregnated;silicone border foam 24  1450   Periwound Care absorptive dressing applied 11/18/24 1450       Wound 10/30/24 1122 Left gluteal (Active)   Wound Image   11/18/24 1450   Dressing Appearance dry;intact 11/18/24 1450   Closure None 11/18/24 1450   Base moist;red;granulating 11/18/24 1450   Periwound dry;intact;pink 11/18/24 1450   Periwound Temperature warm 11/18/24 1450   Periwound Skin Turgor soft 11/18/24 1450   Edges open 11/18/24 1450   Wound Length (cm) 2.5 cm 11/18/24 1450   Wound Width (cm) 2 cm 11/18/24 1450   Wound Depth (cm) 2.1 cm 11/18/24 1450   Wound Surface Area (cm^2) 5 cm^2 11/18/24 1450   Wound Volume (cm^3) 10.5 cm^3 11/18/24 1450   Undermining [Depth (cm)/Location] from 6 o'clock to 12 o'clock with maximum depth of 1.8 cm at 10 o'clock 11/18/24 1450   Drainage Characteristics/Odor serosanguineous 11/18/24 1450   Drainage Amount small 11/18/24 1450   Care, Wound cleansed with;irrigated with;sterile normal saline;negative pressure wound therapy 11/18/24 1450   Dressing Care dressing applied;foam;transparent film;hydrofiber;silver impregnated;hydrocolloid 11/18/24 1450   Periwound Care absorptive dressing applied;barrier film applied 11/18/24 1450       NPWT (Negative Pressure Wound Therapy) 11/01/24 1655 Left Ischium (Active)   Therapy Setting continuous therapy 11/18/24 1450   Dressing foam, black;transparent dressing 11/18/24 1450   Pressure Setting 100 mmHg 11/18/24 1450   Sponges Inserted 3 11/18/24 1450   Finger sweep complete Yes 11/18/24 1450      Wound Check / Follow-up: Patient seen today for wound follow-up and wound VAC dressing change in outpatient nursing services. Patient is awake, alert, and oriented at time of visit. Patient is accompanied by spouse. Patient's spouse reports that patient was too weak to make it to wound clinic appointment on Friday 11/15/2024 and they were instructed by wound care provider to remove wound VAC dressing and pack with normal saline moistened gauze for the weekend. Patient and  spouse deny any issues with wound dressings over the weekend.    Removed dressings with no issues. Wound base to left ischium with red, moist granular tissue present. Periwound tissue is pink, dry and intact. There is some irritation noted. Silver impregnated hydrofiber applied to irritation then secured with transparent drape. Applied skin barrier wipe to remainder of periwound tissue. Cleansed and irrigated wound with normal saline. Filled wound with black foam then secured with transparent drape. Bridge created with additional piece of black foam then third piece of black foam added as landing pad and secured with transparent drape. Strip of hydrocolloid dressing added to medial margin of dressing to aid in seal / securement. VAC attached, foam compressed, seal obtained with no signs of lifting or leaking noted. Suction remains at 100 mmHg for patient tolerance. Patient tolerated dressing change well. Wound showing response to treatment. Recommending to continue negative pressure wound therapy.    Stage III pressure injury to right gluteal aspect continues to show response to treatment. Wound base with red, moist tissue and small amount of yellow sloughing tissue. Cleansed and irrigated wound with normal saline. Applied silver impregnated hydrofiber then secured with silicone border dressing. Recommending to continue daily dressing changes.      Impression: Stage III pressure injury to left ischium with closure by secondary intent. Stage III pressure injury to right gluteal aspect.      Short term goals: Regain skin integrity, skin protection, daily dressing changes, negative pressure wound therapy, 3x weekly dressing changes on MW.      Rhonda Oliva RN    11/18/2024    15:30 EST

## 2024-11-20 ENCOUNTER — HOSPITAL ENCOUNTER (OUTPATIENT)
Dept: INFUSION THERAPY | Facility: HOSPITAL | Age: 54
Discharge: HOME | End: 2024-11-20
Admitting: NURSE PRACTITIONER
Payer: COMMERCIAL

## 2024-11-20 VITALS
TEMPERATURE: 97.5 F | HEART RATE: 78 BPM | DIASTOLIC BLOOD PRESSURE: 63 MMHG | SYSTOLIC BLOOD PRESSURE: 117 MMHG | OXYGEN SATURATION: 99 % | RESPIRATION RATE: 20 BRPM

## 2024-11-20 DIAGNOSIS — L02.31 GLUTEAL ABSCESS: Primary | ICD-10-CM

## 2024-11-20 PROCEDURE — 97605 NEG PRS WND THER DME<=50SQCM: CPT

## 2024-11-20 PROCEDURE — 97606 NEG PRS WND THER DME>50 SQCM: CPT

## 2024-11-21 ENCOUNTER — READMISSION MANAGEMENT (OUTPATIENT)
Dept: CALL CENTER | Facility: HOSPITAL | Age: 54
End: 2024-11-21
Payer: COMMERCIAL

## 2024-11-21 NOTE — OUTREACH NOTE
General Surgery Week 2 Survey      Flowsheet Row Responses   [facility] patient discharged from? [facility]   Does the patient have one of the following disease processes/diagnoses(primary or secondary)? General Surgery   Week 2 attempt successful? Yes   Call start time 1325   Call end time 1328   General alerts for this patient quadriplegia   Discharge diagnosis Sacral ulcer, DEBRIDEMENT OF ISCHIAL ULCER/BUTTOCKS WOUND: clean and remove unhealthy tissue from the wound using surgical instruments, quadriplegia   Is patient permission given to speak with other caregiver? Yes   List who call center can speak with spouse- Jamee   Person spoke with today (if not patient) and relationship spouse   Is the patient taking all medications as directed (includes completed medication regime)? Yes   Does the patient have a follow up appointment scheduled with their surgeon? Yes   Has the patient kept scheduled appointments due by today? Yes   Has home health visited the patient within 72 hours of discharge? N/A   DME comments Wound vac in place-managed by wound care clinic.   Psychosocial issues? No   What is the patient's perception of their health status since discharge? Improving   Nursing interventions Nurse provided patient education   Is the patient/caregiver able to teach back signs and symptoms of incisional infection? Fever, Pus or odor from incision, Incisional warmth, Increased drainage or bleeding, Increased redness, swelling or pain at the incisonal site   Is the patient/caregiver able to teach back the hierarchy of who to call/visit for symptoms/problems? PCP, Specialist, Home health nurse, Urgent Care, ED, 911 Yes   Week 2 call completed? Yes   Graduated Yes   Is the patient interested in additional calls from an ambulatory ? No   Would this patient benefit from a Referral to Amb Social Work? No   Wrap up additional comments Per spouse, patient is doing well, they have been going to wound care clinic  3 times a week, no further calls needed.   Call end time 0037            Li TYLER - Registered Nurse

## 2024-11-22 ENCOUNTER — HOSPITAL ENCOUNTER (OUTPATIENT)
Dept: INFUSION THERAPY | Facility: HOSPITAL | Age: 54
Discharge: HOME | End: 2024-11-22
Payer: COMMERCIAL

## 2024-11-22 VITALS
HEART RATE: 86 BPM | OXYGEN SATURATION: 99 % | DIASTOLIC BLOOD PRESSURE: 79 MMHG | RESPIRATION RATE: 20 BRPM | SYSTOLIC BLOOD PRESSURE: 111 MMHG

## 2024-11-22 DIAGNOSIS — L02.31 GLUTEAL ABSCESS: Primary | ICD-10-CM

## 2024-11-22 PROCEDURE — G0463 HOSPITAL OUTPT CLINIC VISIT: HCPCS

## 2024-11-22 PROCEDURE — 97605 NEG PRS WND THER DME<=50SQCM: CPT

## 2024-11-25 ENCOUNTER — HOSPITAL ENCOUNTER (OUTPATIENT)
Dept: INFUSION THERAPY | Facility: HOSPITAL | Age: 54
Discharge: HOME | End: 2024-11-25
Admitting: NURSE PRACTITIONER
Payer: COMMERCIAL

## 2024-11-25 VITALS
SYSTOLIC BLOOD PRESSURE: 126 MMHG | DIASTOLIC BLOOD PRESSURE: 54 MMHG | OXYGEN SATURATION: 98 % | TEMPERATURE: 98.1 F | HEART RATE: 71 BPM | RESPIRATION RATE: 20 BRPM

## 2024-11-25 DIAGNOSIS — L02.31 GLUTEAL ABSCESS: Primary | ICD-10-CM

## 2024-11-25 PROCEDURE — 97605 NEG PRS WND THER DME<=50SQCM: CPT

## 2024-11-25 PROCEDURE — G0463 HOSPITAL OUTPT CLINIC VISIT: HCPCS

## 2024-11-25 NOTE — SIGNIFICANT NOTE
Wound Eval / Progress Noted     [facility]     Patient Name: Haider Jacobs  : 1970  MRN: [id number]  Today's Date: 2024                 Admit Date: 2024    Visit Dx:    ICD-10-CM ICD-9-CM   1. Gluteal abscess  L02.31 682.5         * No active hospital problems. *        Past Medical History:   Diagnosis Date    Anxiety     Chronic tetraplegia     Contracture of hand     LEFT HAND TIGHT HAND CONTRACTURE. RIGHT HAD CAN OPEN 30%.    Drug abuse     Hematuria     Neurogenic bladder     Spinal cord injury at C1-C4 level without spinal injury     Urinary retention         Past Surgical History:   Procedure Laterality Date    ANKLE SURGERY      CERVICAL FUSION      C2-C7    DEBRIDEMENT OF ISCHIAL ULCER/BUTTOCKS WOUND Left 10/30/2024    Procedure: DEBRIDEMENT OF ISCHIAL ULCER/BUTTOCKS WOUND: clean and remove unhealthy tissue from the wound using surgical instruments;  Surgeon: Saeid Quesada MD;  Location: [facility];  Service: General;  Laterality: Left;    FRACTURE SURGERY      ARM    URETEROSCOPY LASER LITHOTRIPSY WITH STENT INSERTION Bilateral 2023    Procedure: CYSTOSCOPY BILATERAL RETROGRADE PYELOGRAM BLADDER STONE EXTRACTION;  Surgeon: Jennifer Bowman MD;  Location: [facility];  Service: Urology;  Laterality: Bilateral;         Physical Assessment:  Wound 10/10/24 1738 Right gluteal Pressure Injury (Active)   Wound Image   24 1525   Dressing Appearance intact;dry 24 1525   Closure None 24 1525   Base red;moist 24 1525   Red (%), Wound Tissue Color 100 24 1525   Periwound intact;redness 24 1525   Periwound Temperature warm 24 1525   Periwound Skin Turgor soft 24 1525   Edges open 24 1525   Wound Length (cm) 1 cm 24 1525   Wound Width (cm) 1 cm 24 1525   Wound Depth (cm) 0.1 cm 24 1525   Wound Surface Area (cm^2) 1 cm^2 24 1525   Wound Volume (cm^3) 0.1 cm^3 24 1525   Drainage  Characteristics/Odor serosanguineous 11/25/24 1525   Drainage Amount scant 11/25/24 1525   Care, Wound cleansed with;sterile normal saline 11/25/24 1525   Dressing Care dressing applied;silver impregnated;hydrofiber;silicone border foam 11/25/24 1525   Periwound Care absorptive dressing applied 11/25/24 1525       Wound 10/30/24 1122 Left gluteal (Active)   Wound Image   11/25/24 1525   Dressing Appearance moist drainage;intact 11/25/24 1525   Closure None 11/25/24 1525   Base red;moist;granulating 11/25/24 1525   Red (%), Wound Tissue Color 100 11/25/24 1525   Periwound intact;pink 11/25/24 1525   Periwound Temperature warm 11/25/24 1525   Periwound Skin Turgor soft 11/25/24 1525   Edges open 11/25/24 1525   Wound Length (cm) 1.9 cm 11/25/24 1525   Wound Width (cm) 1.9 cm 11/25/24 1525   Wound Depth (cm) 2.5 cm 11/25/24 1525   Wound Surface Area (cm^2) 3.61 cm^2 11/25/24 1525   Wound Volume (cm^3) 9.025 cm^3 11/25/24 1525   Drainage Characteristics/Odor serosanguineous 11/25/24 1525   Drainage Amount small 11/25/24 1525   Care, Wound cleansed with;irrigated with;sterile normal saline;negative pressure wound therapy 11/25/24 1525   Dressing Care dressing applied;foam;transparent film 11/25/24 1525   Periwound Care barrier film applied 11/25/24 1525       NPWT (Negative Pressure Wound Therapy) 11/01/24 1655 Left Ischium (Active)   Therapy Setting continuous therapy 11/25/24 1525   Dressing foam, black 11/25/24 1525   Pressure Setting 100 mmHg 11/25/24 1525   Sponges Inserted 3 11/25/24 1525   Sponges Removed 2 11/25/24 1525   Finger sweep complete Yes 11/25/24 1525        Wound Check / Follow-up:  Patient seen today for a wound check and wound vac dressing change. Patient is awake, alert and oriented. Patient denies any issues with the wound vac over the weekend.    Dressing removed with no issues. Wound base to the left ischium is red and moist with granular tissue present throughout the wound base. Periwound is  pink and intact there is raised pustules present; no open or draining at this time. Cleansed and irrigated the wound and periwound with NS and gauze. Applied skin prep to the periwound and covered the pustules with silver impregnated hydrofiber and drape. Filled the cavity with one black foam and secured with transparent drape. Applied 2 more pieces of black foam as the track pad up the left hip. Wound vac connected, foam well compressed; no alarms noted.    Stage III pressure right gluteal aspect with red moist tissue; wound base is flush with the skin. Appears to be responding to the current treatment with the silver impregnated hydrofiber, will recommend to continue the current treatment at this time.    Impression: pressure injuries to the bilateral ischium's    Short term goals:  regain skin integrity, skin protection, daily dressing changes, negative pressure wound vac therapy    Sana Khan RN    11/25/2024    16:07 EST

## 2024-11-25 NOTE — ADDENDUM NOTE
Encounter addended by: Sana Khan RN on: 11/25/2024 4:17 PM   Actions taken: Flowsheet data copied forward, Flowsheet accepted, Clinical Note Signed, Charge Capture section accepted

## 2024-11-27 ENCOUNTER — HOSPITAL ENCOUNTER (OUTPATIENT)
Dept: INFUSION THERAPY | Facility: HOSPITAL | Age: 54
Discharge: HOME | End: 2024-11-27
Admitting: NURSE PRACTITIONER
Payer: COMMERCIAL

## 2024-11-27 VITALS
RESPIRATION RATE: 20 BRPM | SYSTOLIC BLOOD PRESSURE: 124 MMHG | HEART RATE: 74 BPM | TEMPERATURE: 97.5 F | DIASTOLIC BLOOD PRESSURE: 73 MMHG | OXYGEN SATURATION: 97 %

## 2024-11-27 DIAGNOSIS — L02.31 GLUTEAL ABSCESS: Primary | ICD-10-CM

## 2024-11-27 PROCEDURE — G0463 HOSPITAL OUTPT CLINIC VISIT: HCPCS

## 2024-11-27 PROCEDURE — 97605 NEG PRS WND THER DME<=50SQCM: CPT

## 2024-11-27 PROCEDURE — 99195 PHLEBOTOMY: CPT

## 2024-11-29 ENCOUNTER — HOSPITAL ENCOUNTER (OUTPATIENT)
Dept: INFUSION THERAPY | Facility: HOSPITAL | Age: 54
Discharge: HOME | End: 2024-11-29
Payer: COMMERCIAL

## 2024-11-29 VITALS
RESPIRATION RATE: 20 BRPM | OXYGEN SATURATION: 97 % | SYSTOLIC BLOOD PRESSURE: 114 MMHG | DIASTOLIC BLOOD PRESSURE: 63 MMHG | HEART RATE: 78 BPM | TEMPERATURE: 98 F

## 2024-11-29 DIAGNOSIS — L02.31 GLUTEAL ABSCESS: Primary | ICD-10-CM

## 2024-11-29 PROCEDURE — G0463 HOSPITAL OUTPT CLINIC VISIT: HCPCS

## 2024-11-29 PROCEDURE — 97605 NEG PRS WND THER DME<=50SQCM: CPT

## 2024-12-02 ENCOUNTER — HOSPITAL ENCOUNTER (OUTPATIENT)
Dept: INFUSION THERAPY | Facility: HOSPITAL | Age: 54
Discharge: HOME | End: 2024-12-02
Admitting: SURGERY
Payer: COMMERCIAL

## 2024-12-02 DIAGNOSIS — L02.31 GLUTEAL ABSCESS: Primary | ICD-10-CM

## 2024-12-02 DIAGNOSIS — L89.323 PRESSURE INJURY OF LEFT BUTTOCK, STAGE 3: Primary | ICD-10-CM

## 2024-12-02 PROCEDURE — G0463 HOSPITAL OUTPT CLINIC VISIT: HCPCS

## 2024-12-02 PROCEDURE — 97602 WOUND(S) CARE NON-SELECTIVE: CPT

## 2024-12-02 NOTE — SIGNIFICANT NOTE
Wound Eval / Progress Noted     [facility]     Patient Name: Haider Jacobs  : 1970  MRN: [id number]  Today's Date: 2024                 Admit Date: 2024    Visit Dx:    ICD-10-CM ICD-9-CM   1. Gluteal abscess  L02.31 682.5         * No active hospital problems. *        Past Medical History:   Diagnosis Date    Anxiety     Chronic tetraplegia     Contracture of hand     LEFT HAND TIGHT HAND CONTRACTURE. RIGHT HAD CAN OPEN 30%.    Drug abuse     Hematuria     Neurogenic bladder     Spinal cord injury at C1-C4 level without spinal injury     Urinary retention         Past Surgical History:   Procedure Laterality Date    ANKLE SURGERY      CERVICAL FUSION      C2-C7    DEBRIDEMENT OF ISCHIAL ULCER/BUTTOCKS WOUND Left 10/30/2024    Procedure: DEBRIDEMENT OF ISCHIAL ULCER/BUTTOCKS WOUND: clean and remove unhealthy tissue from the wound using surgical instruments;  Surgeon: Saeid Quesada MD;  Location: [facility];  Service: General;  Laterality: Left;    FRACTURE SURGERY      ARM    URETEROSCOPY LASER LITHOTRIPSY WITH STENT INSERTION Bilateral 2023    Procedure: CYSTOSCOPY BILATERAL RETROGRADE PYELOGRAM BLADDER STONE EXTRACTION;  Surgeon: Jennifer Bowman MD;  Location: [facility];  Service: Urology;  Laterality: Bilateral;         Physical Assessment:  Wound 10/10/24 1738 Right gluteal Pressure Injury (Active)   Wound Image   24 1600   Dressing Appearance intact;dry 24 1600   Closure None 24 1600   Base red;moist 24 1600   Periwound intact;redness 24 1600   Periwound Temperature warm 24 1600   Periwound Skin Turgor soft 24 1600   Edges open 24 1600   Wound Length (cm) 0.6 cm 24 1600   Wound Width (cm) 0.8 cm 24 1600   Wound Depth (cm) 0.1 cm 24 1600   Wound Surface Area (cm^2) 0.48 cm^2 24 1600   Wound Volume (cm^3) 0.048 cm^3 24 1600   Drainage Characteristics/Odor serosanguineous 24 1600  "  Drainage Amount scant 12/02/24 1600   Care, Wound cleansed with;sterile normal saline 12/02/24 1600   Dressing Care dressing applied;silver impregnated;hydrofiber;silicone border foam;border dressing 12/02/24 1600   Periwound Care absorptive dressing applied 12/02/24 1600       Wound 10/30/24 1122 Left gluteal (Active)   Wound Image    12/02/24 1600   Dressing Appearance moist drainage;dressing loose 12/02/24 1600   Closure None 12/02/24 1600   Base maroon/purple;red;moist 12/02/24 1600   Red (%), Wound Tissue Color 100 12/02/24 1600   Periwound intact;pale white;dry 12/02/24 1600   Periwound Temperature warm 12/02/24 1600   Periwound Skin Turgor soft 12/02/24 1600   Edges open 12/02/24 1600   Wound Length (cm) 2.3 cm 12/02/24 1600   Wound Width (cm) 1.5 cm 12/02/24 1600   Wound Depth (cm) 2.6 cm 12/02/24 1600   Wound Surface Area (cm^2) 3.45 cm^2 12/02/24 1600   Wound Volume (cm^3) 8.97 cm^3 12/02/24 1600   Drainage Characteristics/Odor malodorous;serosanguineous 12/02/24 1600   Drainage Amount moderate 12/02/24 1600   Care, Wound cleansed with;irrigated with;sterile normal saline 12/02/24 1600   Dressing Care dressing applied;dressing moistened;gauze, antimicrobial;gauze, [id number];packed with;silicone border foam 12/02/24 1600   Periwound Care absorptive dressing applied 12/02/24 1600       NPWT (Negative Pressure Wound Therapy) 11/01/24 1655 Left Ischium (Active)   Therapy Setting vacuum off 12/02/24 1600        Wound Check / Follow-up:  Patient was seen today for a wound check and wound vac dressing change. Patient denies any issues with the wound vac however he states, \"the wound vac was not sucking.\" Patient also reports that he showered prior to arrival.    Strong odor emitting from the wound / dressing upon entry to the room; when assisting the patient to roll over, a moderate amount of dark red malodorous drainage drained from the dressing, foam was not compressed. Irrigated the wound base with a " copious amount of NS, order did not dissipate with cleansing. Wound base with red moist tissue through out the wound base, there is also areas with darkened tissue present. The darkened tissue is slightly firm. No new tunneling or undermining present; no induration present to the periwound. Discussed with patient and patients spouse the plan to remove the wound vac until Friday to allow for a break from the wound vac due to concerns from the darkened tissue and odor. General surgeon contacted about concerns. Filled the wound base with 1/4 strength Dakin's moistened fluffed gauze. Encouraged the patient to come daily for dressing changes however the patient relies on [employer] for transportation, the transportation service requires a 72 hour notice so he will not be able to be seen in [facility] daily. Discussed over the phone with the spouse and she is agreeable to perform dressing changes twice daily until Friday. Instructed her to moistened the gauze roll with the Dakins and lightly fill the wound. Spouse verbalized understanding and supplies were provided.     Right ischium with a shallow wound base with red moist tissue present. Periwound is pink and soft. Cleansed with NS and gauze. Applied silver impregnated hydrofiber to the wound base and secured with a silicone border dressing.     Impression: surgical incision with delayed closure    Short term goals: Regain skin integrity, skin protection, moisture vision, pressure reduction, twice daily dressing changes, quality skin care hygiene.    Sana Khan RN    12/2/2024    16:31 EST

## 2024-12-04 ENCOUNTER — HOSPITAL ENCOUNTER (OUTPATIENT)
Dept: INFUSION THERAPY | Facility: HOSPITAL | Age: 54
Discharge: HOME | End: 2024-12-04
Admitting: SURGERY
Payer: COMMERCIAL

## 2024-12-04 VITALS
HEART RATE: 65 BPM | TEMPERATURE: 97.6 F | OXYGEN SATURATION: 95 % | RESPIRATION RATE: 20 BRPM | SYSTOLIC BLOOD PRESSURE: 109 MMHG | DIASTOLIC BLOOD PRESSURE: 65 MMHG

## 2024-12-04 DIAGNOSIS — L02.31 GLUTEAL ABSCESS: Primary | ICD-10-CM

## 2024-12-04 PROCEDURE — 97602 WOUND(S) CARE NON-SELECTIVE: CPT

## 2024-12-04 PROCEDURE — G0463 HOSPITAL OUTPT CLINIC VISIT: HCPCS

## 2024-12-04 RX ADMIN — SODIUM HYPOCHLORITE: 1.25 SOLUTION TOPICAL at 14:29

## 2024-12-06 ENCOUNTER — HOSPITAL ENCOUNTER (OUTPATIENT)
Dept: INFUSION THERAPY | Facility: HOSPITAL | Age: 54
Discharge: HOME | End: 2024-12-06
Payer: COMMERCIAL

## 2024-12-06 VITALS
HEART RATE: 76 BPM | TEMPERATURE: 97.8 F | DIASTOLIC BLOOD PRESSURE: 76 MMHG | OXYGEN SATURATION: 98 % | SYSTOLIC BLOOD PRESSURE: 126 MMHG

## 2024-12-06 DIAGNOSIS — L02.31 GLUTEAL ABSCESS: Primary | ICD-10-CM

## 2024-12-06 PROCEDURE — G0463 HOSPITAL OUTPT CLINIC VISIT: HCPCS

## 2024-12-09 ENCOUNTER — HOSPITAL ENCOUNTER (OUTPATIENT)
Dept: INFUSION THERAPY | Facility: HOSPITAL | Age: 54
Discharge: HOME | End: 2024-12-09
Admitting: NURSE PRACTITIONER
Payer: COMMERCIAL

## 2024-12-09 VITALS
RESPIRATION RATE: 20 BRPM | OXYGEN SATURATION: 100 % | DIASTOLIC BLOOD PRESSURE: 74 MMHG | SYSTOLIC BLOOD PRESSURE: 124 MMHG | TEMPERATURE: 98.1 F | HEART RATE: 76 BPM

## 2024-12-09 DIAGNOSIS — L02.31 GLUTEAL ABSCESS: Primary | ICD-10-CM

## 2024-12-09 PROCEDURE — G0463 HOSPITAL OUTPT CLINIC VISIT: HCPCS

## 2024-12-09 PROCEDURE — 97605 NEG PRS WND THER DME<=50SQCM: CPT

## 2024-12-11 ENCOUNTER — HOSPITAL ENCOUNTER (OUTPATIENT)
Dept: INFUSION THERAPY | Facility: HOSPITAL | Age: 54
Discharge: HOME | End: 2024-12-11
Admitting: NURSE PRACTITIONER
Payer: COMMERCIAL

## 2024-12-11 VITALS
RESPIRATION RATE: 20 BRPM | TEMPERATURE: 98.2 F | HEART RATE: 67 BPM | SYSTOLIC BLOOD PRESSURE: 126 MMHG | OXYGEN SATURATION: 100 % | DIASTOLIC BLOOD PRESSURE: 71 MMHG

## 2024-12-11 DIAGNOSIS — L02.31 GLUTEAL ABSCESS: Primary | ICD-10-CM

## 2024-12-11 PROCEDURE — 97605 NEG PRS WND THER DME<=50SQCM: CPT

## 2024-12-11 NOTE — SIGNIFICANT NOTE
Wound Eval / Progress Noted     [facility]     Patient Name: Haider Jacobs  : 1970  MRN: [id number]  Today's Date: 2024                 Admit Date: 2024    Visit Dx:    ICD-10-CM ICD-9-CM   1. Gluteal abscess  L02.31 682.5         * No active hospital problems. *        Past Medical History:   Diagnosis Date    Anxiety     Chronic tetraplegia     Contracture of hand     LEFT HAND TIGHT HAND CONTRACTURE. RIGHT HAD CAN OPEN 30%.    Drug abuse     Hematuria     Neurogenic bladder     Spinal cord injury at C1-C4 level without spinal injury     Urinary retention         Past Surgical History:   Procedure Laterality Date    ANKLE SURGERY      CERVICAL FUSION      C2-C7    DEBRIDEMENT OF ISCHIAL ULCER/BUTTOCKS WOUND Left 10/30/2024    Procedure: DEBRIDEMENT OF ISCHIAL ULCER/BUTTOCKS WOUND: clean and remove unhealthy tissue from the wound using surgical instruments;  Surgeon: Saeid Quesada MD;  Location: [facility];  Service: General;  Laterality: Left;    FRACTURE SURGERY      ARM    URETEROSCOPY LASER LITHOTRIPSY WITH STENT INSERTION Bilateral 2023    Procedure: CYSTOSCOPY BILATERAL RETROGRADE PYELOGRAM BLADDER STONE EXTRACTION;  Surgeon: Jennifer Bowman MD;  Location: [facility];  Service: Urology;  Laterality: Bilateral;         Physical Assessment:  Wound 10/10/24 1738 Right gluteal Pressure Injury (Active)   Wound Image   24 1506   Dressing Appearance dry;intact 24 1506   Closure None 24 1506   Base moist;red 24 1506   Periwound dry;intact;pink 24 1506   Periwound Temperature warm 24 1506   Periwound Skin Turgor soft 24 1506   Edges open 24 1506   Drainage Characteristics/Odor serosanguineous 24 1506   Drainage Amount scant 24 1506   Care, Wound cleansed with;sterile normal saline 24 1506   Dressing Care dressing applied;hydrofiber;silver impregnated;silicone border foam 24 1506   Periwound Care  absorptive dressing applied 12/11/24 1506       Wound 10/30/24 1122 Left gluteal (Active)   Wound Image   12/11/24 1506   Dressing Appearance dry;intact 12/11/24 1506   Closure None 12/11/24 1506   Base red;moist;granulating 12/11/24 1506   Periwound dry;intact;pink 12/11/24 1506   Periwound Temperature warm 12/11/24 1506   Periwound Skin Turgor soft 12/11/24 1506   Edges open 12/11/24 1506   Wound Length (cm) 1.8 cm 12/11/24 1506   Wound Width (cm) 1.1 cm 12/11/24 1506   Wound Depth (cm) 2.4 cm 12/11/24 1506   Wound Surface Area (cm^2) 1.98 cm^2 12/11/24 1506   Wound Volume (cm^3) 4.752 cm^3 12/11/24 1506   Drainage Characteristics/Odor serosanguineous 12/11/24 1506   Drainage Amount scant 12/11/24 1506   Care, Wound cleansed with;irrigated with;sterile normal saline;negative pressure wound therapy 12/11/24 1506   Dressing Care dressing applied;foam;transparent film;hydrocolloid 12/11/24 1506   Periwound Care absorptive dressing applied;barrier film applied 12/11/24 1506       NPWT (Negative Pressure Wound Therapy) 11/01/24 1655 Left Ischium (Active)   Therapy Setting continuous therapy 12/11/24 1506   Dressing foam, black;transparent dressing 12/11/24 1506   Pressure Setting 100 mmHg 12/11/24 1506   Sponges Inserted 3 12/11/24 1506   Sponges Removed 2 12/11/24 1506   Finger sweep complete Yes 12/11/24 1506      Wound Check / Follow-up: Patient seen today for wound follow-up and wound VAC dressing change in outpatient nursing services. Patient is awake, alert, and oriented at time of visit. He denies any issues with wound VAC or dressing. VAC currently functioning properly without alarms.    Removed dressing with no issues. Wound base to left ischium with red, moist granulating tissue present. Periwound tissue is dry and intact. Cleansed and irrigated wound with normal saline. Applied skin barrier wipe to periwound tissue. Filled wound with black foam then secured with transparent drape. Strip of hydrocolloid  dressing added to medial margin of dressing to aid in seal / securement. Two additional pieces of black foam added as bridge and landing pad then secured with transparent drape. VAC attached, foam compressed, seal obtained with no signs of lifting or leaking noted. Patient tolerated well. Recommending to continue negative pressure wound therapy.    Right gluteal wound base remains shallow with red, moist tissue noted. Periwound tissue is dry and intact. Cleansed wound with normal saline and gauze. Applied silver impregnated hydrofiber and secured with silicone border dressing. Recommending to continue daily dressing changes.      Impression: Surgical wound with closure by secondary intent.      Short term goals: Regain skin integrity, skin protection, pressure reduction, negative pressure wound therapy, 3x weekly dressing changes on MWF, daily dressing changes.      Rhonda Oliva RN    12/11/2024    15:50 EST

## 2024-12-13 ENCOUNTER — HOSPITAL ENCOUNTER (OUTPATIENT)
Dept: INFUSION THERAPY | Facility: HOSPITAL | Age: 54
Discharge: HOME | End: 2024-12-13
Payer: COMMERCIAL

## 2024-12-13 VITALS
RESPIRATION RATE: 18 BRPM | TEMPERATURE: 97.7 F | OXYGEN SATURATION: 99 % | DIASTOLIC BLOOD PRESSURE: 71 MMHG | SYSTOLIC BLOOD PRESSURE: 111 MMHG | HEART RATE: 72 BPM

## 2024-12-13 DIAGNOSIS — L02.31 GLUTEAL ABSCESS: Primary | ICD-10-CM

## 2024-12-13 PROCEDURE — 97605 NEG PRS WND THER DME<=50SQCM: CPT

## 2024-12-16 ENCOUNTER — HOSPITAL ENCOUNTER (OUTPATIENT)
Dept: INFUSION THERAPY | Facility: HOSPITAL | Age: 54
Discharge: HOME | End: 2024-12-16
Admitting: NURSE PRACTITIONER
Payer: COMMERCIAL

## 2024-12-16 VITALS
OXYGEN SATURATION: 97 % | RESPIRATION RATE: 20 BRPM | HEART RATE: 73 BPM | TEMPERATURE: 97.7 F | DIASTOLIC BLOOD PRESSURE: 77 MMHG | SYSTOLIC BLOOD PRESSURE: 120 MMHG

## 2024-12-16 DIAGNOSIS — L02.31 GLUTEAL ABSCESS: Primary | ICD-10-CM

## 2024-12-16 PROCEDURE — G0463 HOSPITAL OUTPT CLINIC VISIT: HCPCS

## 2024-12-16 PROCEDURE — 97605 NEG PRS WND THER DME<=50SQCM: CPT

## 2024-12-16 NOTE — SIGNIFICANT NOTE
Spoke to Em at wound clinic  They have seen patient in the past but will call patient to get an appointment on the schedule .   No additional referral needed per Em

## 2024-12-16 NOTE — SIGNIFICANT NOTE
Wound Eval / Progress Noted     [facility]     Patient Name: Haider Jacobs  : 1970  MRN: [id number]  Today's Date: 2024                 Admit Date: 2024    Visit Dx:    ICD-10-CM ICD-9-CM   1. Gluteal abscess  L02.31 682.5         * No active hospital problems. *        Past Medical History:   Diagnosis Date    Anxiety     Chronic tetraplegia     Contracture of hand     LEFT HAND TIGHT HAND CONTRACTURE. RIGHT HAD CAN OPEN 30%.    Drug abuse     Hematuria     Neurogenic bladder     Spinal cord injury at C1-C4 level without spinal injury     Urinary retention         Past Surgical History:   Procedure Laterality Date    ANKLE SURGERY      CERVICAL FUSION      C2-C7    DEBRIDEMENT OF ISCHIAL ULCER/BUTTOCKS WOUND Left 10/30/2024    Procedure: DEBRIDEMENT OF ISCHIAL ULCER/BUTTOCKS WOUND: clean and remove unhealthy tissue from the wound using surgical instruments;  Surgeon: Saeid Quesada MD;  Location: [facility]  Service: General;  Laterality: Left;    FRACTURE SURGERY      ARM    URETEROSCOPY LASER LITHOTRIPSY WITH STENT INSERTION Bilateral 2023    Procedure: CYSTOSCOPY BILATERAL RETROGRADE PYELOGRAM BLADDER STONE EXTRACTION;  Surgeon: Jennifer Bowman MD;  Location: [facility]  Service: Urology;  Laterality: Bilateral;         Physical Assessment:  Wound 10/10/24 1738 Right gluteal Pressure Injury (Active)   Wound Image   24 1448   Dressing Appearance dry;intact 24 1448   Closure None 24 1448   Base moist;red 24 1448   Periwound dry;intact;pink 24 1448   Periwound Temperature warm 24 1448   Periwound Skin Turgor soft 24 1448   Edges open 24 1448   Wound Length (cm) 0.3 cm 24 1448   Wound Width (cm) 0.8 cm 24 1448   Wound Depth (cm) 0.1 cm 24 1448   Wound Surface Area (cm^2) 0.24 cm^2 24 1448   Wound Volume (cm^3) 0.024 cm^3 24 1448   Drainage Characteristics/Odor serosanguineous 248    Drainage Amount scant 12/16/24 1448   Care, Wound cleansed with;sterile normal saline 12/16/24 1448   Dressing Care dressing applied;hydrofiber;silver impregnated;silicone border foam 12/16/24 1448   Periwound Care absorptive dressing applied 12/16/24 1448       Wound 10/30/24 1122 Left gluteal (Active)   Wound Image   12/16/24 1448   Dressing Appearance dry;intact 12/16/24 1448   Closure None 12/16/24 1448   Base red;moist;granulating 12/16/24 1448   Periwound dry;intact;pink 12/16/24 1448   Periwound Temperature warm 12/16/24 1448   Periwound Skin Turgor soft 12/16/24 1448   Edges open 12/16/24 1448   Wound Length (cm) 1.3 cm 12/16/24 1448   Wound Width (cm) 1 cm 12/16/24 1448   Wound Depth (cm) 1.8 cm 12/16/24 1448   Wound Surface Area (cm^2) 1.3 cm^2 12/16/24 1448   Wound Volume (cm^3) 2.34 cm^3 12/16/24 1448   Undermining [Depth (cm)/Location] from 10 o'clock to 12 o'clock with maximum depth of 1.9 cm at 10 o'clock 12/16/24 1448   Drainage Characteristics/Odor serosanguineous 12/16/24 1448   Drainage Amount scant 12/16/24 1448   Care, Wound cleansed with;irrigated with;sterile normal saline;negative pressure wound therapy 12/16/24 1448   Dressing Care dressing applied;foam;transparent film 12/16/24 1448   Periwound Care absorptive dressing applied 12/16/24 1448       NPWT (Negative Pressure Wound Therapy) 11/01/24 1655 Left Ischium (Active)   Therapy Setting continuous therapy 12/16/24 1448   Dressing foam, black;transparent dressing 12/16/24 1448   Pressure Setting 100 mmHg 12/16/24 1448   Sponges Inserted 2 12/16/24 1448   Sponges Removed 3 12/16/24 1448   Finger sweep complete Yes 12/16/24 1448      Wound Check / Follow-up: Patient seen today for wound follow-up and wound VAC dressing change in outpatient nursing services. Patient is awake, alert, and oriented at time of visit. Spouse is present at bedside. Patient and spouse deny any issues with wound VAC or dressing. VAC is functioning properly without  alarms and dressing is clean, dry and intact.     Removed dressing with use of adhesive remover. Wound base to left ischium with red, moist granulating tissue. Periwound tissue is dry and intact. Cleansed and irrigated wound with normal saline. Applied skin barrier wipe to periwound tissue. Filled wound with black foam then secured with transparent drape. Additional piece of black foam added as bridge to left hip then secured with transparent drape. VAC attached, foam compressed, seal obtained with no signs of lifting or leaking noted. Patient tolerated well. Recommending to continue negative pressure wound therapy.    Right gluteal wound base remains with red, moist tissue noted. Periwound tissue is dry and intact. Cleansed with normal saline and gauze. Applied silver impregnated hydrofiber then secured with silicone border dressing. Recommending to continue daily dressing changes.      Impression: Surgical wound with closure by secondary intent.      Short term goals: Regain skin integrity, skin protection, pressure reduction, negative pressure wound therapy, 3x weekly dressing changes on MWF, daily dressing changes.      Rhonda Oliva RN    12/16/2024    15:29 EST

## 2024-12-18 ENCOUNTER — HOSPITAL ENCOUNTER (OUTPATIENT)
Dept: INFUSION THERAPY | Facility: HOSPITAL | Age: 54
Discharge: HOME | End: 2024-12-18
Admitting: NURSE PRACTITIONER
Payer: COMMERCIAL

## 2024-12-18 VITALS
RESPIRATION RATE: 20 BRPM | HEART RATE: 79 BPM | SYSTOLIC BLOOD PRESSURE: 149 MMHG | TEMPERATURE: 97.8 F | DIASTOLIC BLOOD PRESSURE: 80 MMHG | OXYGEN SATURATION: 98 %

## 2024-12-18 DIAGNOSIS — L02.31 GLUTEAL ABSCESS: Primary | ICD-10-CM

## 2024-12-18 PROCEDURE — G0463 HOSPITAL OUTPT CLINIC VISIT: HCPCS

## 2024-12-18 PROCEDURE — 97605 NEG PRS WND THER DME<=50SQCM: CPT

## 2024-12-20 ENCOUNTER — HOSPITAL ENCOUNTER (OUTPATIENT)
Dept: INFUSION THERAPY | Facility: HOSPITAL | Age: 54
Discharge: HOME | End: 2024-12-20
Payer: COMMERCIAL

## 2024-12-20 VITALS
RESPIRATION RATE: 20 BRPM | TEMPERATURE: 97.1 F | SYSTOLIC BLOOD PRESSURE: 106 MMHG | DIASTOLIC BLOOD PRESSURE: 66 MMHG | OXYGEN SATURATION: 98 % | HEART RATE: 74 BPM

## 2024-12-20 DIAGNOSIS — L02.31 GLUTEAL ABSCESS: Primary | ICD-10-CM

## 2024-12-23 ENCOUNTER — HOSPITAL ENCOUNTER (OUTPATIENT)
Dept: INFUSION THERAPY | Facility: HOSPITAL | Age: 54
Discharge: HOME | End: 2024-12-23
Admitting: NURSE PRACTITIONER
Payer: COMMERCIAL

## 2024-12-23 VITALS
SYSTOLIC BLOOD PRESSURE: 115 MMHG | HEART RATE: 80 BPM | DIASTOLIC BLOOD PRESSURE: 72 MMHG | OXYGEN SATURATION: 99 % | RESPIRATION RATE: 18 BRPM

## 2024-12-23 DIAGNOSIS — L02.31 GLUTEAL ABSCESS: Primary | ICD-10-CM

## 2024-12-23 PROCEDURE — 97605 NEG PRS WND THER DME<=50SQCM: CPT

## 2024-12-23 NOTE — SIGNIFICANT NOTE
Wound Eval / Progress Noted     [facility]     Patient Name: Haider Jacobs  : 1970  MRN: [id number]  Today's Date: 2024                 Admit Date: 2024    Visit Dx:    ICD-10-CM ICD-9-CM   1. Gluteal abscess  L02.31 682.5         * No active hospital problems. *        Past Medical History:   Diagnosis Date    Anxiety     Chronic tetraplegia     Contracture of hand     LEFT HAND TIGHT HAND CONTRACTURE. RIGHT HAD CAN OPEN 30%.    Drug abuse     Hematuria     Neurogenic bladder     Spinal cord injury at C1-C4 level without spinal injury     Urinary retention         Past Surgical History:   Procedure Laterality Date    ANKLE SURGERY      CERVICAL FUSION      C2-C7    DEBRIDEMENT OF ISCHIAL ULCER/BUTTOCKS WOUND Left 10/30/2024    Procedure: DEBRIDEMENT OF ISCHIAL ULCER/BUTTOCKS WOUND: clean and remove unhealthy tissue from the wound using surgical instruments;  Surgeon: Saeid Quesada MD;  Location: [facility];  Service: General;  Laterality: Left;    FRACTURE SURGERY      ARM    URETEROSCOPY LASER LITHOTRIPSY WITH STENT INSERTION Bilateral 2023    Procedure: CYSTOSCOPY BILATERAL RETROGRADE PYELOGRAM BLADDER STONE EXTRACTION;  Surgeon: Jennifer Bowman MD;  Location: [facility];  Service: Urology;  Laterality: Bilateral;         Physical Assessment:  Wound 10/10/24 1738 Right gluteal Pressure Injury (Active)   Wound Image   24 1511   Dressing Appearance dry;intact 24 1511   Closure None 24 1511   Base red;moist 24 151   Periwound dry;intact;pink 24 1511   Periwound Temperature warm 24 1511   Periwound Skin Turgor soft 24 1511   Edges open 24 1511   Drainage Characteristics/Odor serosanguineous 24 1511   Drainage Amount scant 24 1511   Care, Wound cleansed with;sterile normal saline 24 151   Dressing Care dressing applied;hydrofiber;silver impregnated;silicone border foam 24 1511   Periwound Care  absorptive dressing applied 12/23/24 1511       Wound 10/30/24 1122 Left gluteal (Active)   Wound Image   12/23/24 1511   Dressing Appearance dry;intact 12/23/24 1511   Closure None 12/23/24 1511   Base red;moist;granulating 12/23/24 1511   Periwound dry;intact 12/23/24 1511   Periwound Temperature warm 12/23/24 1511   Periwound Skin Turgor soft 12/23/24 1511   Edges open 12/23/24 1511   Wound Length (cm) 1.3 cm 12/23/24 1511   Wound Width (cm) 1 cm 12/23/24 1511   Wound Depth (cm) 1.9 cm 12/23/24 1511   Wound Surface Area (cm^2) 1.3 cm^2 12/23/24 1511   Wound Volume (cm^3) 2.47 cm^3 12/23/24 1511   Undermining [Depth (cm)/Location] from 10 o'clock to 12 o'clock with maximum depth of 1.1 cm at 10 o'clock 12/23/24 1511   Drainage Characteristics/Odor serosanguineous 12/23/24 1511   Drainage Amount scant 12/23/24 1511   Care, Wound cleansed with;irrigated with;sterile normal saline;negative pressure wound therapy 12/23/24 1511   Dressing Care dressing applied;foam;transparent film;hydrocolloid 12/23/24 1511   Periwound Care absorptive dressing applied;barrier film applied 12/23/24 1511       NPWT (Negative Pressure Wound Therapy) 11/01/24 1655 Left Ischium (Active)   Therapy Setting continuous therapy 12/23/24 1511   Dressing foam, black;transparent dressing 12/23/24 1511   Pressure Setting 125 mmHg 12/23/24 1511   Sponges Inserted 2 12/23/24 1511   Sponges Removed 3 12/23/24 1511   Finger sweep complete Yes 12/23/24 1511      Wound Check / Follow-up: Patient seen today for wound follow-up and wound VAC dressing change in outpatient nursing services.  Patient is awake, alert and oriented at time of visit.  Spouse is present at bedside.  Patient and spouse deny any issues with wound VAC or dressing over the weekend.  VAC is functioning properly without alarms and dressing is clean, dry and intact.    Removed dressing with use of adhesive remover.  Wound base to left ischium with red, moist granulating tissue.   Periwound tissue is dry and intact.  Wound margins continue to contract and depth continues to fill in.  Small amount of undermining remains present, measurements as stated above.  Cleansed and irrigated wound with normal saline.  Applied skin barrier wipe to periwound tissue.  Filled wound with black foam then secured with transparent drape.  Additional piece of black foam added as bridge to left hip then secured with transparent drape.  VAC attached, foam compressed, seal obtained with no signs of lifting or leaking.  Strip of hydrocolloid dressing added to edges to aid in seal/securement.  Patient tolerated well.  Recommending to continue negative pressure wound therapy at this time.    Right gluteal wound small in size with superficial depth noted.  Moist, red tissue is present to wound base.  Periwound tissue is dry and intact.  Cleansed with normal saline and gauze.  Applied silver impregnated Hydrofiber and secured with silicone border dressing.  Recommending to continue daily dressing changes.      Impression: Surgical wound with closure by secondary intent.      Short term goals: Regain skin integrity, skin protection, pressure reduction, negative pressure wound therapy, 3 times weekly dressing changes on MW, daily dressing changes.        Rhonda Oliva RN    12/23/2024    15:38 EST

## 2024-12-27 ENCOUNTER — HOSPITAL ENCOUNTER (OUTPATIENT)
Dept: INFUSION THERAPY | Facility: HOSPITAL | Age: 54
Discharge: HOME | End: 2024-12-27
Payer: COMMERCIAL

## 2024-12-27 VITALS
SYSTOLIC BLOOD PRESSURE: 111 MMHG | OXYGEN SATURATION: 95 % | DIASTOLIC BLOOD PRESSURE: 62 MMHG | RESPIRATION RATE: 20 BRPM | HEART RATE: 75 BPM | TEMPERATURE: 98.6 F

## 2024-12-27 DIAGNOSIS — L02.31 GLUTEAL ABSCESS: Primary | ICD-10-CM

## 2024-12-27 PROCEDURE — 97606 NEG PRS WND THER DME>50 SQCM: CPT

## 2024-12-27 PROCEDURE — 97605 NEG PRS WND THER DME<=50SQCM: CPT

## 2024-12-30 ENCOUNTER — HOSPITAL ENCOUNTER (OUTPATIENT)
Dept: INFUSION THERAPY | Facility: HOSPITAL | Age: 54
Discharge: HOME | End: 2024-12-30
Admitting: NURSE PRACTITIONER
Payer: COMMERCIAL

## 2024-12-30 VITALS
TEMPERATURE: 97.8 F | RESPIRATION RATE: 20 BRPM | DIASTOLIC BLOOD PRESSURE: 67 MMHG | SYSTOLIC BLOOD PRESSURE: 119 MMHG | HEART RATE: 61 BPM | OXYGEN SATURATION: 96 %

## 2024-12-30 DIAGNOSIS — L02.31 GLUTEAL ABSCESS: Primary | ICD-10-CM

## 2024-12-30 PROCEDURE — 97605 NEG PRS WND THER DME<=50SQCM: CPT

## 2024-12-30 PROCEDURE — G0463 HOSPITAL OUTPT CLINIC VISIT: HCPCS

## 2024-12-30 NOTE — SIGNIFICANT NOTE
Wound Eval / Progress Noted     [facility]     Patient Name: Haider Jacobs  : 1970  MRN: [id number]  Today's Date: 2024                 Admit Date: 2024    Visit Dx:    ICD-10-CM ICD-9-CM   1. Gluteal abscess  L02.31 682.5         * No active hospital problems. *        Past Medical History:   Diagnosis Date    Anxiety     Chronic tetraplegia     Contracture of hand     LEFT HAND TIGHT HAND CONTRACTURE. RIGHT HAD CAN OPEN 30%.    Drug abuse     Hematuria     Neurogenic bladder     Spinal cord injury at C1-C4 level without spinal injury     Urinary retention         Past Surgical History:   Procedure Laterality Date    ANKLE SURGERY      CERVICAL FUSION      C2-C7    DEBRIDEMENT OF ISCHIAL ULCER/BUTTOCKS WOUND Left 10/30/2024    Procedure: DEBRIDEMENT OF ISCHIAL ULCER/BUTTOCKS WOUND: clean and remove unhealthy tissue from the wound using surgical instruments;  Surgeon: Saeid Quesada MD;  Location: [facility];  Service: General;  Laterality: Left;    FRACTURE SURGERY      ARM    URETEROSCOPY LASER LITHOTRIPSY WITH STENT INSERTION Bilateral 2023    Procedure: CYSTOSCOPY BILATERAL RETROGRADE PYELOGRAM BLADDER STONE EXTRACTION;  Surgeon: Jennifer Bowman MD;  Location: [facility];  Service: Urology;  Laterality: Bilateral;         Physical Assessment:  Wound 10/10/24 1738 Right gluteal Pressure Injury (Active)   Dressing Appearance dry 24 1515   Closure None 24 1515   Base red;epithelialization;dry 24 1515   Red (%), Wound Tissue Color 100 24 1515   Periwound dry;intact;pink 24 1515   Periwound Temperature warm 245   Periwound Skin Turgor soft 24 1515   Edges open 24 1515   Wound Length (cm) 0.1 cm 24 1515   Wound Width (cm) 0.1 cm 24 1515   Wound Depth (cm) 0.1 cm 24 1515   Wound Surface Area (cm^2) 0.01 cm^2 24 1515   Wound Volume (cm^3) 0.001 cm^3 24 1515   Drainage Amount none 24 1515    Care, Wound cleansed with;sterile normal saline 12/30/24 1515   Dressing Care dressing applied;silver impregnated;hydrofiber;silicone border foam 12/30/24 1515   Periwound Care absorptive dressing applied 12/30/24 1515       Wound 10/30/24 1122 Left gluteal (Active)   Wound Image   12/30/24 1515   Dressing Appearance intact;dry 12/30/24 1515   Closure None 12/30/24 1515   Base red;moist;granulating 12/30/24 1515   Red (%), Wound Tissue Color 100 12/30/24 1515   Periwound dry;intact;pink 12/30/24 1515   Periwound Temperature warm 12/30/24 1515   Periwound Skin Turgor soft 12/30/24 1515   Edges open 12/30/24 1515   Wound Length (cm) 1.4 cm 12/30/24 1515   Wound Width (cm) 0.9 cm 12/30/24 1515   Wound Depth (cm) 2.9 cm 12/30/24 1515   Wound Surface Area (cm^2) 1.26 cm^2 12/30/24 1515   Wound Volume (cm^3) 3.654 cm^3 12/30/24 1515   Drainage Characteristics/Odor serosanguineous 12/30/24 1515   Drainage Amount small 12/30/24 1515   Care, Wound cleansed with;irrigated with;sterile normal saline;negative pressure wound therapy 12/30/24 1515   Dressing Care dressing applied;foam;tubular wrap 12/30/24 1515   Periwound Care barrier film applied 12/30/24 1515       NPWT (Negative Pressure Wound Therapy) 11/01/24 1655 Left Ischium (Active)   Therapy Setting continuous therapy 12/30/24 1515   Dressing foam, black 12/30/24 1515   Pressure Setting 125 mmHg 12/30/24 1515   Sponges Inserted 1 12/30/24 1515   Sponges Removed 2 12/30/24 1515   Finger sweep complete Yes 12/30/24 1515        Wound Check / Follow-up:  Patient seen today for a wound check and wound vac dressing change. Patient denies any issues with the wound vac, but is reporting some discomfort to the bilateral ischium's. No new induration or skin breakdown present. Patient is unsure if he will be able to make it to his appointment on Thursday, patient was instructed that the wound vac dressing had to be removed and a NS moistened fluffed gauze place to the wound on  Thursday if he does not come to the appointment.    Wound base is red and moist, granular tissue present. Periwound is pink and soft. Cleansed with NS and gauze. Applied skin barrier to the periwound and a strip of transparent drape applied to the periwound to the left hip. Filled the wound base with one black foam, with the same black foam extended to the left hip and secured with drape. Wound vac connected, foam well compressed. No alarms noted.    Right ischium with a small area of red dry tissue. Periwound is soft and dry. Recommending to continue the wound care until full closure is achieved.     Impression: surgical incision with secondary closure, negative pressure wound vac therapy    Short term goals:  regain skin integrity, skin protection, every other day dressing change, negative pressure wound vac therapy on Monday, Wednesday, Fridays.     Sana Khan RN    12/30/2024    15:26 EST

## 2025-01-03 ENCOUNTER — HOSPITAL ENCOUNTER (OUTPATIENT)
Dept: INFUSION THERAPY | Facility: HOSPITAL | Age: 55
Discharge: HOME | End: 2025-01-03
Payer: COMMERCIAL

## 2025-01-03 VITALS
DIASTOLIC BLOOD PRESSURE: 68 MMHG | RESPIRATION RATE: 20 BRPM | OXYGEN SATURATION: 97 % | SYSTOLIC BLOOD PRESSURE: 108 MMHG | HEART RATE: 66 BPM | TEMPERATURE: 98.3 F

## 2025-01-03 DIAGNOSIS — L02.31 GLUTEAL ABSCESS: Primary | ICD-10-CM

## 2025-01-08 ENCOUNTER — TELEPHONE (OUTPATIENT)
Dept: WOUND CARE | Facility: HOSPITAL | Age: 55
End: 2025-01-08
Payer: COMMERCIAL

## 2025-01-08 NOTE — TELEPHONE ENCOUNTER
Spoke with patient's wife who reports patient's vac was removed on 1/6/25 and has been applying NS moist to dry dressing changes. Patient is scheduled with [facility] on 1/10/25.   Susan Sykes RN

## 2025-01-13 ENCOUNTER — OFFICE VISIT (OUTPATIENT)
Dept: WOUND CARE | Facility: HOSPITAL | Age: 55
End: 2025-01-13
Payer: COMMERCIAL

## 2025-01-13 VITALS
RESPIRATION RATE: 16 BRPM | HEART RATE: 73 BPM | TEMPERATURE: 98.1 F | SYSTOLIC BLOOD PRESSURE: 108 MMHG | DIASTOLIC BLOOD PRESSURE: 75 MMHG

## 2025-01-13 DIAGNOSIS — L89.313 PRESSURE INJURY OF RIGHT BUTTOCK, STAGE 3: ICD-10-CM

## 2025-01-13 DIAGNOSIS — F17.210 TOBACCO DEPENDENCE DUE TO CIGARETTES: ICD-10-CM

## 2025-01-13 DIAGNOSIS — E46 PROTEIN-CALORIE MALNUTRITION, UNSPECIFIED SEVERITY: ICD-10-CM

## 2025-01-13 DIAGNOSIS — L89.323 PRESSURE INJURY OF LEFT BUTTOCK, STAGE 3: Primary | ICD-10-CM

## 2025-01-13 PROCEDURE — 1159F MED LIST DOCD IN RCRD: CPT | Performed by: NURSE PRACTITIONER

## 2025-01-13 PROCEDURE — 1160F RVW MEDS BY RX/DR IN RCRD: CPT | Performed by: NURSE PRACTITIONER

## 2025-01-13 PROCEDURE — 97605 NEG PRS WND THER DME<=50SQCM: CPT | Performed by: NURSE PRACTITIONER

## 2025-01-13 PROCEDURE — 99213 OFFICE O/P EST LOW 20 MIN: CPT | Performed by: NURSE PRACTITIONER
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
## 2025-01-13 NOTE — PROCEDURES
Procedure   Wound Vac Incision    Performed by: Indu Sales APRN  Authorized by: Indu Sales APRN    Associated wounds:   Wound 10/30/24 1122 Left gluteal  Wound Vac:     Type of Foam:  Black    mmH    continuous      Frequency of change:  Three times per week    Patient tolerance:  Good

## 2025-01-14 RX ORDER — CUSHION
1 EACH MISCELLANEOUS SEE ADMIN INSTRUCTIONS
Qty: 1 EACH | Refills: 0 | Status: SHIPPED | OUTPATIENT
Start: 2025-01-14

## 2025-01-15 ENCOUNTER — CLINICAL SUPPORT (OUTPATIENT)
Dept: WOUND CARE | Facility: HOSPITAL | Age: 55
End: 2025-01-15
Payer: COMMERCIAL

## 2025-01-15 VITALS
TEMPERATURE: 97.9 F | RESPIRATION RATE: 16 BRPM | SYSTOLIC BLOOD PRESSURE: 99 MMHG | DIASTOLIC BLOOD PRESSURE: 71 MMHG | HEART RATE: 87 BPM

## 2025-01-15 DIAGNOSIS — L02.31 GLUTEAL ABSCESS: Primary | ICD-10-CM

## 2025-01-15 PROCEDURE — 97605 NEG PRS WND THER DME<=50SQCM: CPT | Performed by: NURSE PRACTITIONER

## 2025-01-15 NOTE — PROGRESS NOTES
PATIENT PRESENTED TO CLINIC FOR A WOUND DRESSING CHANGE. VITALS WERE OBTAINED AND WOUND DRESSINGS WERE REMOVED. WOUNDS WERE CLEANSED AND PATTED DRY. WOUND PICTURES WERE OBTAINED AND WOUND ASSESSMENT WAS COMPLETED. THERAPY PLAN FOLLOWED AND DOCUMENTED IN WOUND CARE FLOWSHEET. PATIENT TOLERATED WELL.

## 2025-01-15 NOTE — PROCEDURES
Procedure   Wound Vac Incision    Performed by: Francie Kevin APRN  Authorized by: Francie Kevin APRN    Associated wounds:   Wound 10/30/24 1122 Left gluteal  Wound Vac:     Type of Foam:  Black    mmH    continuous      Frequency of change:  Three times per week    Patient tolerance:  Good

## 2025-01-17 ENCOUNTER — CLINICAL SUPPORT (OUTPATIENT)
Dept: WOUND CARE | Facility: HOSPITAL | Age: 55
End: 2025-01-17
Payer: COMMERCIAL

## 2025-01-17 VITALS
HEART RATE: 75 BPM | SYSTOLIC BLOOD PRESSURE: 102 MMHG | DIASTOLIC BLOOD PRESSURE: 64 MMHG | RESPIRATION RATE: 16 BRPM | TEMPERATURE: 99.6 F

## 2025-01-17 DIAGNOSIS — L02.31 GLUTEAL ABSCESS: Primary | ICD-10-CM

## 2025-01-17 PROCEDURE — 97605 NEG PRS WND THER DME<=50SQCM: CPT | Performed by: NURSE PRACTITIONER

## 2025-01-17 NOTE — PROCEDURES
cProcedure   Wound Vac Incision    Performed by: Mery Frank RN  Authorized by: Francie Kevin APRN    Associated wounds:   Wound 10/30/24 1122 Left gluteal  Wound Vac:     Type of Foam:  Black    mmH    continuous      Frequency of change:  Three times per week    Patient tolerance:  Good

## 2025-01-20 ENCOUNTER — CLINICAL SUPPORT (OUTPATIENT)
Dept: WOUND CARE | Facility: HOSPITAL | Age: 55
End: 2025-01-20
Payer: COMMERCIAL

## 2025-01-20 VITALS
RESPIRATION RATE: 16 BRPM | HEART RATE: 84 BPM | TEMPERATURE: 97.6 F | SYSTOLIC BLOOD PRESSURE: 119 MMHG | DIASTOLIC BLOOD PRESSURE: 62 MMHG

## 2025-01-20 DIAGNOSIS — L02.31 GLUTEAL ABSCESS: Primary | ICD-10-CM

## 2025-01-20 PROCEDURE — 97605 NEG PRS WND THER DME<=50SQCM: CPT | Performed by: NURSE PRACTITIONER

## 2025-01-20 NOTE — PROGRESS NOTES
Pt arrived with no wound vac on, packing dressing and silicone border in place on arrival. Wound cleaned with NS and guaze, queenie wound skin prepped. Drape applied, wound base filled with black foam. NPWT at 125 mmhg continuous suction. Patient tolerated well.

## 2025-01-20 NOTE — PROCEDURES
Procedure   Wound Vac Incision    Performed by: Teri Nieto, RN  Authorized by: Indu Sales APRN    Associated wounds:   Wound 10/30/24 1122 Left gluteal  Wound Vac:     Type of Foam:  Black    mmH    continuous      Frequency of change:  3 times a week    Patient tolerance:  Good

## 2025-01-22 ENCOUNTER — CLINICAL SUPPORT (OUTPATIENT)
Dept: WOUND CARE | Facility: HOSPITAL | Age: 55
End: 2025-01-22
Payer: COMMERCIAL

## 2025-01-22 VITALS
HEART RATE: 73 BPM | DIASTOLIC BLOOD PRESSURE: 55 MMHG | SYSTOLIC BLOOD PRESSURE: 92 MMHG | RESPIRATION RATE: 18 BRPM | TEMPERATURE: 98.9 F

## 2025-01-22 DIAGNOSIS — L02.31 GLUTEAL ABSCESS: Primary | ICD-10-CM

## 2025-01-22 PROCEDURE — 97605 NEG PRS WND THER DME<=50SQCM: CPT | Performed by: NURSE PRACTITIONER

## 2025-01-22 NOTE — PROGRESS NOTES
Pt arrived to ONS today with NPWT VAC in place, pt c/o no alarms since applied. Wound VAC removed, wound cleansed with NS and gauze, queenie-wound prepped with skin prep and transparent film, wound base packed with black foam and tracked to midline back. NPWT set to 125mmHg continuous suction. Pt tolerated well.

## 2025-01-22 NOTE — PROCEDURES
Procedure   Wound Vac Incision    Performed by: Idalia Luong RN  Authorized by: Francie Kevin APRN    Associated wounds:   Wound 10/30/24 1122 Left gluteal  Wound Vac:     Type of Foam:  Black    mmH    continuous      Frequency of change:  3x weekly    Patient tolerance:  Good

## 2025-01-24 ENCOUNTER — CLINICAL SUPPORT (OUTPATIENT)
Dept: WOUND CARE | Facility: HOSPITAL | Age: 55
End: 2025-01-24
Payer: COMMERCIAL

## 2025-01-24 VITALS
SYSTOLIC BLOOD PRESSURE: 115 MMHG | DIASTOLIC BLOOD PRESSURE: 63 MMHG | TEMPERATURE: 99.2 F | RESPIRATION RATE: 16 BRPM | HEART RATE: 68 BPM

## 2025-01-24 DIAGNOSIS — L02.31 GLUTEAL ABSCESS: Primary | ICD-10-CM

## 2025-01-24 PROCEDURE — 97605 NEG PRS WND THER DME<=50SQCM: CPT | Performed by: NURSE PRACTITIONER

## 2025-01-24 NOTE — PROCEDURES
Procedure   Wound Vac Incision    Performed by: Idalia Luong, RN  Authorized by: Indu Sales APRN    Associated wounds:   Wound 10/30/24 1122 Left gluteal  Wound Vac:     Type of Foam:  Black    mmH    continuous      Frequency of change:  3x weekly    Patient tolerance:  Good

## 2025-01-27 ENCOUNTER — OFFICE VISIT (OUTPATIENT)
Dept: WOUND CARE | Facility: HOSPITAL | Age: 55
End: 2025-01-27
Payer: COMMERCIAL

## 2025-01-27 VITALS
TEMPERATURE: 97.7 F | RESPIRATION RATE: 18 BRPM | SYSTOLIC BLOOD PRESSURE: 105 MMHG | HEART RATE: 84 BPM | DIASTOLIC BLOOD PRESSURE: 67 MMHG

## 2025-01-27 DIAGNOSIS — Z87.828 HISTORY OF SPINAL CORD INJURY: ICD-10-CM

## 2025-01-27 DIAGNOSIS — L89.323 PRESSURE INJURY OF LEFT BUTTOCK, STAGE 3: Primary | ICD-10-CM

## 2025-01-27 DIAGNOSIS — E46 PROTEIN-CALORIE MALNUTRITION, UNSPECIFIED SEVERITY: ICD-10-CM

## 2025-01-27 PROCEDURE — 1160F RVW MEDS BY RX/DR IN RCRD: CPT | Performed by: NURSE PRACTITIONER

## 2025-01-27 PROCEDURE — 97605 NEG PRS WND THER DME<=50SQCM: CPT | Performed by: NURSE PRACTITIONER

## 2025-01-27 PROCEDURE — 1159F MED LIST DOCD IN RCRD: CPT | Performed by: NURSE PRACTITIONER

## 2025-01-27 PROCEDURE — 99213 OFFICE O/P EST LOW 20 MIN: CPT | Performed by: NURSE PRACTITIONER
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
## 2025-01-29 ENCOUNTER — CLINICAL SUPPORT (OUTPATIENT)
Dept: WOUND CARE | Facility: HOSPITAL | Age: 55
End: 2025-01-29
Payer: COMMERCIAL

## 2025-01-29 VITALS
HEART RATE: 74 BPM | RESPIRATION RATE: 16 BRPM | SYSTOLIC BLOOD PRESSURE: 150 MMHG | TEMPERATURE: 97.5 F | DIASTOLIC BLOOD PRESSURE: 82 MMHG

## 2025-01-29 DIAGNOSIS — L02.31 GLUTEAL ABSCESS: Primary | ICD-10-CM

## 2025-01-29 PROCEDURE — 97605 NEG PRS WND THER DME<=50SQCM: CPT | Performed by: NURSE PRACTITIONER

## 2025-01-29 NOTE — PROCEDURES
Procedure   Wound Vac Incision    Performed by: Teri Nieto, RN  Authorized by: Indu Sales APRN    Associated wounds:   Wound 10/30/24 1122 Left gluteal  Wound Vac:     Type of Foam:  Black    mmH    continuous      Frequency of change:  3 times weekly    Patient tolerance:  Good

## 2025-01-29 NOTE — PROGRESS NOTES
Pt arrived to ONS today with NPWT VAC in place, pt c/o no alarms since applied. Wound VAC removed, wound cleansed with NS and gauze and Dakins irrigated into wound, queenie-wound prepped with skin prep and transparent film, wound base packed with collagen Ag and black foam and tracked to midline back. NPWT set to 125mmHg continuous suction. Pt tolerated well.

## 2025-01-31 ENCOUNTER — CLINICAL SUPPORT (OUTPATIENT)
Dept: WOUND CARE | Facility: HOSPITAL | Age: 55
End: 2025-01-31
Payer: COMMERCIAL

## 2025-01-31 VITALS
RESPIRATION RATE: 18 BRPM | DIASTOLIC BLOOD PRESSURE: 81 MMHG | SYSTOLIC BLOOD PRESSURE: 106 MMHG | TEMPERATURE: 97.6 F | HEART RATE: 77 BPM

## 2025-01-31 DIAGNOSIS — L02.31 GLUTEAL ABSCESS: Primary | ICD-10-CM

## 2025-01-31 PROCEDURE — 97605 NEG PRS WND THER DME<=50SQCM: CPT | Performed by: NURSE PRACTITIONER

## 2025-01-31 NOTE — PROGRESS NOTES
PATIENT PRESENTED TO CLINIC FOR A WOUND DRESSING CHANGE. VITALS WERE OBTAINED AND WOUND DRESSING WAS REMOVED. WOUND WAS CLEANSED AND PATTED DRY. WOUND PICTURES WERE OBTAINED AND WOUND ASSESSMENT WAS COMPLETED. THERAPY PLAN FOLLOWED AND DOCUMENTED IN WOUND CARE FLOWSHEET. PATIENT TOLERATED WELL.

## 2025-02-03 ENCOUNTER — CLINICAL SUPPORT (OUTPATIENT)
Dept: WOUND CARE | Facility: HOSPITAL | Age: 55
End: 2025-02-03
Payer: COMMERCIAL

## 2025-02-03 VITALS
SYSTOLIC BLOOD PRESSURE: 117 MMHG | HEART RATE: 86 BPM | RESPIRATION RATE: 16 BRPM | TEMPERATURE: 97.6 F | DIASTOLIC BLOOD PRESSURE: 68 MMHG

## 2025-02-03 DIAGNOSIS — L02.31 GLUTEAL ABSCESS: Primary | ICD-10-CM

## 2025-02-03 PROCEDURE — 97605 NEG PRS WND THER DME<=50SQCM: CPT | Performed by: NURSE PRACTITIONER

## 2025-02-03 NOTE — PROGRESS NOTES
Pt arrived to ONS today with NPWT VAC off, pt removed VAC prior to arrival to take a shower. pt c/o no alarms since applied however did complain of an increase in pain/tenderness throughout the weekend. Wound VAC removed, wound cleansed with NS and gauze, queenie-wound prepped with skin prep and transparent film, single layer of collagen ag placed to wound base, wound cavity filled with black foam and tracked to midline back. NPWT set to 125mmHg continuous suction. Pt tolerated well.

## 2025-02-03 NOTE — PROCEDURES
Procedure   Wound Vac Incision    Performed by: Teri Nieto, RN  Authorized by: Indu Sales APRN    Associated wounds:   Wound 10/30/24 1122 Left gluteal  Wound Vac:     Type of Foam:  Black    mmH    continuous      Frequency of change:  3x weekly    Patient tolerance:  Good

## 2025-02-06 ENCOUNTER — CLINICAL SUPPORT (OUTPATIENT)
Dept: WOUND CARE | Facility: HOSPITAL | Age: 55
End: 2025-02-06
Payer: COMMERCIAL

## 2025-02-06 VITALS
HEART RATE: 86 BPM | RESPIRATION RATE: 20 BRPM | SYSTOLIC BLOOD PRESSURE: 122 MMHG | DIASTOLIC BLOOD PRESSURE: 66 MMHG | TEMPERATURE: 98.4 F

## 2025-02-06 DIAGNOSIS — L89.323 PRESSURE INJURY OF LEFT BUTTOCK, STAGE 3: Primary | ICD-10-CM

## 2025-02-06 PROCEDURE — G0463 HOSPITAL OUTPT CLINIC VISIT: HCPCS | Performed by: NURSE PRACTITIONER

## 2025-02-06 NOTE — PROGRESS NOTES
"Pt arrived to ONS today with NPWT VAC on. Pt c/o increase in pain/tenderness throughout the week. Wound VAC removed, wound cleansed with NS and gauze. MOHAN Sales saw pt due to increase pain and tenderness, new orders placed and will be re evaluated Monday 2/10/25.  Lightly pack with 1/4\" Packing gauze moistened with 0.25% Dakins solution, packed to wound bed and undermining, covered with silicone border. And educate caregiver to change BID. Picture obtained and pt tolerated well.   "

## 2025-02-10 ENCOUNTER — OFFICE VISIT (OUTPATIENT)
Dept: WOUND CARE | Facility: HOSPITAL | Age: 55
End: 2025-02-10
Payer: COMMERCIAL

## 2025-02-10 VITALS
DIASTOLIC BLOOD PRESSURE: 61 MMHG | RESPIRATION RATE: 18 BRPM | TEMPERATURE: 98.1 F | HEART RATE: 88 BPM | SYSTOLIC BLOOD PRESSURE: 93 MMHG

## 2025-02-10 DIAGNOSIS — Z87.828 HISTORY OF SPINAL CORD INJURY: ICD-10-CM

## 2025-02-10 DIAGNOSIS — L89.323 PRESSURE INJURY OF LEFT BUTTOCK, STAGE 3: Primary | ICD-10-CM

## 2025-02-10 DIAGNOSIS — F17.210 TOBACCO DEPENDENCE DUE TO CIGARETTES: ICD-10-CM

## 2025-02-10 PROCEDURE — 1160F RVW MEDS BY RX/DR IN RCRD: CPT | Performed by: EMERGENCY MEDICINE

## 2025-02-10 PROCEDURE — 99213 OFFICE O/P EST LOW 20 MIN: CPT | Performed by: EMERGENCY MEDICINE

## 2025-02-10 PROCEDURE — 1159F MED LIST DOCD IN RCRD: CPT | Performed by: EMERGENCY MEDICINE

## 2025-02-10 PROCEDURE — 97605 NEG PRS WND THER DME<=50SQCM: CPT | Performed by: EMERGENCY MEDICINE

## 2025-02-10 NOTE — PROGRESS NOTES
Chief Complaint  Dressing Change (Pt here today for dressing change, pt states the pain is better but the wound is bigger)    Subjective      History of Present Illness    Haider Jacobs  is a 54 y.o. male     History of Present Illness  The patient is a 54-year-old male here for a recheck of a sacral wound. He is accompanied by his wife.    He had previously been using a wound VAC but took a break from it last week due to pain. Since then, he has been performing Dakin's wet-to-dry dressing changes. He reports that the wound has slightly enlarged, which he attributes to the removal of the wound VAC. Despite this, he notes that the overall condition of the wound appears healthy. He expresses a desire to resume the use of the wound VAC. He has been adhering to the recommended practice of avoiding pressure on the wound and finds the provided mattress to be beneficial. His wife confirms that they have been changing the VAC dressings twice weekly, specifically on Mondays and Thursdays.    Allergies:  Varenicline      Current Outpatient Medications:     baclofen (LIORESAL) 20 MG tablet, Take 1 tablet by mouth 3 (Three) Times a Day., Disp: , Rfl:     busPIRone (BUSPAR) 30 MG tablet, Take 1 tablet by mouth 2 (Two) Times a Day., Disp: , Rfl:     folic acid (FOLVITE) 1 MG tablet, Take 1 tablet by mouth Daily., Disp: , Rfl:     gabapentin (NEURONTIN) 300 MG capsule, Take 1 capsule by mouth 3 (Three) Times a Day., Disp: , Rfl:     hydrOXYzine (ATARAX) 50 MG tablet, Take 1 tablet by mouth 2 (Two) Times a Day As Needed for Itching., Disp: , Rfl:     ibuprofen (ADVIL,MOTRIN) 800 MG tablet, Take 1 tablet by mouth Every 6 (Six) Hours As Needed for Mild Pain., Disp: , Rfl:     Lactobacillus (Florajen Acidophilus) capsule, Take 1 capsule by mouth Daily., Disp: 28 capsule, Rfl: 0    Misc. Devices (Wheelchair Cushion) misc, Use 1 each See Admin Instructions., Disp: 1 each, Rfl: 0    Nutritional Supplements (Tristian) pack, Take 1 packet by  Noted    Called pt detailed message left that we called the pharmacist and received the correct medication to order.  approved this and it was sent to [facility] yesterday evening.  Advised to call if she has any additional questions "mouth 2 (Two) Times a Day., Disp: 60 packet, Rfl: 5    OLANZapine (zyPREXA) 5 MG tablet, Take 1 tablet by mouth Every Night., Disp: , Rfl:     omeprazole (priLOSEC) 40 MG capsule, Take 1 capsule by mouth Daily., Disp: , Rfl:     QUEtiapine (SEROquel) 400 MG tablet, Take 1 tablet by mouth Every Night., Disp: , Rfl:     tamsulosin (FLOMAX) 0.4 MG capsule 24 hr capsule, Take 2 capsules by mouth Daily for 360 days., Disp: 180 capsule, Rfl: 3    traZODone (DESYREL) 150 MG tablet, Take 1 tablet by mouth Every Night., Disp: , Rfl:     Past Medical History:   Diagnosis Date    Anxiety     Chronic tetraplegia     Contracture of hand     LEFT HAND TIGHT HAND CONTRACTURE. RIGHT HAD CAN OPEN 30%.    Drug abuse     Hematuria     Neurogenic bladder     Spinal cord injury at C1-C4 level without spinal injury     Urinary retention      Past Surgical History:   Procedure Laterality Date    ANKLE SURGERY      CERVICAL FUSION      C2-C7    DEBRIDEMENT OF ISCHIAL ULCER/BUTTOCKS WOUND Left 10/30/2024    Procedure: DEBRIDEMENT OF ISCHIAL ULCER/BUTTOCKS WOUND: clean and remove unhealthy tissue from the wound using surgical instruments;  Surgeon: Saeid Quesada MD;  Location: [facility] OR;  Service: General;  Laterality: Left;    FRACTURE SURGERY      ARM    URETEROSCOPY LASER LITHOTRIPSY WITH STENT INSERTION Bilateral 11/21/2023    Procedure: CYSTOSCOPY BILATERAL RETROGRADE PYELOGRAM BLADDER STONE EXTRACTION;  Surgeon: Jennifer Bowman MD;  Location: [facility];  Service: Urology;  Laterality: Bilateral;     Social History     Socioeconomic History    Marital status:    Tobacco Use    Smoking status: Every Day     Current packs/day: 1.00     Types: Cigarettes     Passive exposure: Current    Smokeless tobacco: Never   Vaping Use    Vaping status: Never Used   Substance and Sexual Activity    Alcohol use: Not Currently    Drug use: Yes     Comment: fentanyl powder /percocets \"off the street\" for managing pain    " Sexual activity: Defer           Objective     Vitals:    02/10/25 1409   BP: 93/61   BP Location: Left arm   Patient Position: Lying   Cuff Size: Adult   Pulse: 88   Resp: 18  Comment: 100% RA   Temp: 98.1 °F (36.7 °C)   TempSrc: Temporal   PainSc:   6     There is no height or weight on file to calculate BMI.    STEADI Fall Risk Assessment has not been completed.     Review of Systems     ROS:  Per HPI.     I have reviewed the HPI and ROS as documented by MA/RN. Doreen Alaniz MD    Physical Exam     NAD  AAOx3, pleasant, cooperative      Physical Exam  The patient's wound exhibits healthy granulation tissue throughout the base, no slough, no evidence of infection, mild appropriate tenderness. Surrounding tissues are healthy without evidence of deep tissue pressure injury.         Result Review :  The following data was reviewed by: Doreen Alaniz MD on 02/10/2025:    Prior notes and images.               Assessment and Plan   Diagnoses and all orders for this visit:    1. Pressure injury of left buttock, stage 3 (Primary)    2. History of spinal cord injury    3. Tobacco dependence due to cigarettes        Assessment & Plan  1. Sacral wound.  The wound has shown significant improvement with the use of the wound VAC, although there was a slight setback when it was temporarily removed. The wound exhibits healthy granulation tissue throughout the base, with no slough or evidence of infection, and only mild appropriate tenderness. Surrounding tissues are healthy without evidence of deep tissue pressure injury. He will continue with the wound VAC therapy, which has been effective. Dressing changes will be maintained twice weekly on Mondays and Thursdays. If adjustments are needed, the frequency can be modified.    Follow-up  The patient will follow up in 4 weeks.      Patient was given instructions and counseling regarding their condition or for health maintenance advice, as well as the wound care plan and  recommendations. They understand and agree with the plan.  They will follow back up here in the clinic but are instructed to contact us in the interim should they have any new, returning, or worsening symptoms or concerns. Please see specific information pulled into the AVS if appropriate.     Dragon Dictation utilized for chart completion.    Follow Up   Return in about 4 weeks (around 3/10/2025).      Doreen Alaniz MD    Patient or patient representative verbalized consent for the use of Ambient Listening during the visit with  Doreen Alaniz MD for chart documentation. 2/10/2025  14:31 EST

## 2025-02-10 NOTE — PROCEDURES
Procedure   Wound Vac Incision    Performed by: Teri Nieto RN  Authorized by: Doreen Alaniz MD    Associated wounds:   Wound 10/30/24 1122 Left gluteal  Wound Vac:     Type of Foam:  Black    mmH    continuous      Frequency of change:  2 times a week    Patient tolerance:  Good

## 2025-02-13 ENCOUNTER — CLINICAL SUPPORT (OUTPATIENT)
Dept: WOUND CARE | Facility: HOSPITAL | Age: 55
End: 2025-02-13
Payer: COMMERCIAL

## 2025-02-13 VITALS
TEMPERATURE: 98.3 F | DIASTOLIC BLOOD PRESSURE: 78 MMHG | SYSTOLIC BLOOD PRESSURE: 132 MMHG | HEART RATE: 81 BPM | RESPIRATION RATE: 18 BRPM

## 2025-02-13 DIAGNOSIS — L02.31 GLUTEAL ABSCESS: Primary | ICD-10-CM

## 2025-02-13 PROCEDURE — 97605 NEG PRS WND THER DME<=50SQCM: CPT | Performed by: EMERGENCY MEDICINE

## 2025-02-13 NOTE — PROCEDURES
Procedure   Wound Vac Incision    Performed by: Teri Nieto RN  Authorized by: Doreen Alaniz MD    Associated wounds:   Wound 10/30/24 1122 Left gluteal  Wound Vac:     Type of Foam:  Black    mmH    continuous      Frequency of change:  2 times weekly    Patient tolerance:  Good

## 2025-02-13 NOTE — PROGRESS NOTES
Pt arrived to [facility] today with NPWT VAC off, pt removed VAC prior to arrival to take a shower. Wound VAC removed, wound cleansed with NS and gauze, queenie-wound prepped with skin prep and transparent film, single layer of collagen ag placed to wound base, wound cavity filled with black foam and tracked to midline back. NPWT set to 125mmHg continuous suction. Pt tolerated well.

## 2025-02-17 ENCOUNTER — CLINICAL SUPPORT (OUTPATIENT)
Dept: WOUND CARE | Facility: HOSPITAL | Age: 55
End: 2025-02-17
Payer: COMMERCIAL

## 2025-02-17 VITALS
DIASTOLIC BLOOD PRESSURE: 78 MMHG | SYSTOLIC BLOOD PRESSURE: 126 MMHG | HEART RATE: 80 BPM | RESPIRATION RATE: 16 BRPM | TEMPERATURE: 98.3 F

## 2025-02-17 DIAGNOSIS — L02.31 GLUTEAL ABSCESS: Primary | ICD-10-CM

## 2025-02-17 PROCEDURE — 97605 NEG PRS WND THER DME<=50SQCM: CPT | Performed by: EMERGENCY MEDICINE

## 2025-02-24 ENCOUNTER — CLINICAL SUPPORT (OUTPATIENT)
Dept: WOUND CARE | Facility: HOSPITAL | Age: 55
End: 2025-02-24
Payer: COMMERCIAL

## 2025-02-24 VITALS
SYSTOLIC BLOOD PRESSURE: 109 MMHG | TEMPERATURE: 98.3 F | RESPIRATION RATE: 16 BRPM | DIASTOLIC BLOOD PRESSURE: 71 MMHG | HEART RATE: 77 BPM

## 2025-02-24 DIAGNOSIS — L02.31 GLUTEAL ABSCESS: Primary | ICD-10-CM

## 2025-02-24 PROCEDURE — 97605 NEG PRS WND THER DME<=50SQCM: CPT | Performed by: EMERGENCY MEDICINE

## 2025-02-27 ENCOUNTER — CLINICAL SUPPORT (OUTPATIENT)
Dept: WOUND CARE | Facility: HOSPITAL | Age: 55
End: 2025-02-27
Payer: COMMERCIAL

## 2025-02-27 VITALS
DIASTOLIC BLOOD PRESSURE: 79 MMHG | SYSTOLIC BLOOD PRESSURE: 140 MMHG | RESPIRATION RATE: 16 BRPM | HEART RATE: 86 BPM | TEMPERATURE: 98.6 F

## 2025-02-27 DIAGNOSIS — L02.31 GLUTEAL ABSCESS: Primary | ICD-10-CM

## 2025-02-27 PROCEDURE — 97605 NEG PRS WND THER DME<=50SQCM: CPT | Performed by: EMERGENCY MEDICINE

## 2025-02-27 NOTE — PROCEDURES
Procedure   Wound Vac Incision    Performed by: Teri Nieto RN  Authorized by: Doreen Alaniz MD    Associated wounds:   Wound 10/30/24 1122 Left gluteal  Wound Vac:     Type of Foam:  Black    mmH    Frequency of change:  2 times weekly    Patient tolerance:  Good

## 2025-03-03 ENCOUNTER — CLINICAL SUPPORT (OUTPATIENT)
Dept: WOUND CARE | Facility: HOSPITAL | Age: 55
End: 2025-03-03
Payer: COMMERCIAL

## 2025-03-03 VITALS
TEMPERATURE: 97.3 F | DIASTOLIC BLOOD PRESSURE: 67 MMHG | HEART RATE: 79 BPM | SYSTOLIC BLOOD PRESSURE: 123 MMHG | RESPIRATION RATE: 18 BRPM

## 2025-03-03 DIAGNOSIS — L02.31 GLUTEAL ABSCESS: Primary | ICD-10-CM

## 2025-03-03 PROCEDURE — 97605 NEG PRS WND THER DME<=50SQCM: CPT | Performed by: EMERGENCY MEDICINE

## 2025-03-06 ENCOUNTER — CLINICAL SUPPORT (OUTPATIENT)
Dept: WOUND CARE | Facility: HOSPITAL | Age: 55
End: 2025-03-06
Payer: COMMERCIAL

## 2025-03-06 VITALS
RESPIRATION RATE: 16 BRPM | SYSTOLIC BLOOD PRESSURE: 100 MMHG | DIASTOLIC BLOOD PRESSURE: 60 MMHG | TEMPERATURE: 99.2 F | HEART RATE: 73 BPM

## 2025-03-06 DIAGNOSIS — L02.31 GLUTEAL ABSCESS: Primary | ICD-10-CM

## 2025-03-06 PROCEDURE — 97605 NEG PRS WND THER DME<=50SQCM: CPT | Performed by: EMERGENCY MEDICINE

## 2025-03-06 NOTE — PROCEDURES
Procedure   Wound Vac Incision    Performed by: Jerica Loera RN  Authorized by: Doreen Alaniz MD    Associated wounds:   Wound 10/30/24 1122 Left gluteal  Wound Vac:     Type of Foam:  Black    mmH    continuous      Frequency of change:  3 times per week    Patient tolerance:  Fair

## 2025-03-10 ENCOUNTER — OFFICE VISIT (OUTPATIENT)
Dept: WOUND CARE | Facility: HOSPITAL | Age: 55
End: 2025-03-10
Payer: COMMERCIAL

## 2025-03-10 VITALS
DIASTOLIC BLOOD PRESSURE: 61 MMHG | RESPIRATION RATE: 16 BRPM | HEART RATE: 78 BPM | TEMPERATURE: 98.6 F | SYSTOLIC BLOOD PRESSURE: 96 MMHG

## 2025-03-10 DIAGNOSIS — L89.323 PRESSURE INJURY OF LEFT BUTTOCK, STAGE 3: Primary | ICD-10-CM

## 2025-03-10 DIAGNOSIS — E46 PROTEIN-CALORIE MALNUTRITION, UNSPECIFIED SEVERITY: ICD-10-CM

## 2025-03-10 PROCEDURE — G0463 HOSPITAL OUTPT CLINIC VISIT: HCPCS | Performed by: EMERGENCY MEDICINE

## 2025-03-10 PROCEDURE — 1160F RVW MEDS BY RX/DR IN RCRD: CPT | Performed by: EMERGENCY MEDICINE

## 2025-03-10 PROCEDURE — 1159F MED LIST DOCD IN RCRD: CPT | Performed by: EMERGENCY MEDICINE

## 2025-03-10 NOTE — PROGRESS NOTES
Chief Complaint  Wound Check (Follow-up on left buttock wound)    Subjective      History of Present Illness    Haider Jacobs  is a 54 y.o. male     History of Present Illness  The patient is a 54-year-old male here for a recheck of a left buttock wound.    He has been receiving wound VAC therapy 3 times weekly, which was last removed on Sunday. The wound appears to be diminishing in size, with a similar depth noted. He has been making efforts to avoid putting pressure on the wound.  He has inquired about the possibility of initiating physical therapy, as they had previously refrained from doing so due to the concurrent wound clinic treatments for the wound VAC.  He would like to discontinue the wound VAC if possible.    Allergies:  Varenicline      Current Outpatient Medications:     baclofen (LIORESAL) 20 MG tablet, Take 1 tablet by mouth 3 (Three) Times a Day., Disp: , Rfl:     busPIRone (BUSPAR) 30 MG tablet, Take 1 tablet by mouth 2 (Two) Times a Day., Disp: , Rfl:     folic acid (FOLVITE) 1 MG tablet, Take 1 tablet by mouth Daily., Disp: , Rfl:     gabapentin (NEURONTIN) 300 MG capsule, Take 1 capsule by mouth 3 (Three) Times a Day., Disp: , Rfl:     hydrOXYzine (ATARAX) 50 MG tablet, Take 1 tablet by mouth 2 (Two) Times a Day As Needed for Itching., Disp: , Rfl:     ibuprofen (ADVIL,MOTRIN) 800 MG tablet, Take 1 tablet by mouth Every 6 (Six) Hours As Needed for Mild Pain., Disp: , Rfl:     Lactobacillus (Florajen Acidophilus) capsule, Take 1 capsule by mouth Daily., Disp: 28 capsule, Rfl: 0    Misc. Devices (Wheelchair Cushion) misc, Use 1 each See Admin Instructions., Disp: 1 each, Rfl: 0    Nutritional Supplements (Tristian) pack, Take 1 packet by mouth 2 (Two) Times a Day., Disp: 60 packet, Rfl: 5    OLANZapine (zyPREXA) 5 MG tablet, Take 1 tablet by mouth Every Night., Disp: , Rfl:     omeprazole (priLOSEC) 40 MG capsule, Take 1 capsule by mouth Daily., Disp: , Rfl:     QUEtiapine (SEROquel) 400 MG tablet,  "Take 1 tablet by mouth Every Night., Disp: , Rfl:     tamsulosin (FLOMAX) 0.4 MG capsule 24 hr capsule, Take 2 capsules by mouth Daily for 360 days., Disp: 180 capsule, Rfl: 3    traZODone (DESYREL) 150 MG tablet, Take 1 tablet by mouth Every Night., Disp: , Rfl:     Past Medical History:   Diagnosis Date    Anxiety     Chronic tetraplegia     Contracture of hand     LEFT HAND TIGHT HAND CONTRACTURE. RIGHT HAD CAN OPEN 30%.    Drug abuse     Hematuria     Neurogenic bladder     Spinal cord injury at C1-C4 level without spinal injury     Urinary retention      Past Surgical History:   Procedure Laterality Date    ANKLE SURGERY      CERVICAL FUSION      C2-C7    DEBRIDEMENT OF ISCHIAL ULCER/BUTTOCKS WOUND Left 10/30/2024    Procedure: DEBRIDEMENT OF ISCHIAL ULCER/BUTTOCKS WOUND: clean and remove unhealthy tissue from the wound using surgical instruments;  Surgeon: Saeid Quesada MD;  Location: [facility];  Service: General;  Laterality: Left;    FRACTURE SURGERY      ARM    URETEROSCOPY LASER LITHOTRIPSY WITH STENT INSERTION Bilateral 11/21/2023    Procedure: CYSTOSCOPY BILATERAL RETROGRADE PYELOGRAM BLADDER STONE EXTRACTION;  Surgeon: Jennifer Bowman MD;  Location: [facility];  Service: Urology;  Laterality: Bilateral;     Social History     Socioeconomic History    Marital status:    Tobacco Use    Smoking status: Every Day     Current packs/day: 1.00     Types: Cigarettes     Passive exposure: Current    Smokeless tobacco: Never   Vaping Use    Vaping status: Never Used   Substance and Sexual Activity    Alcohol use: Not Currently    Drug use: Yes     Comment: fentanyl powder /percocets \"off the street\" for managing pain    Sexual activity: Defer           Objective     Vitals:    03/10/25 1353   BP: 96/61   BP Location: Right arm   Patient Position: Sitting   Cuff Size: Adult   Pulse: 78   Resp: 16  Comment: O2: 100% ON RA   Temp: 98.6 °F (37 °C)   TempSrc: Temporal   PainSc: 5  "   PainLoc: Buttocks  Comment: left buttock pain     There is no height or weight on file to calculate BMI.    STEADI Fall Risk Assessment has not been completed.     Review of Systems     ROS:  Per HPI.     I have reviewed the HPI and ROS as documented by MA/RN. Doreen Alaniz MD    Physical Exam     NAD  AAOx3, pleasant, cooperative      Physical Exam  The wound on the patient's left buttock shows healthy granulation tissue throughout the base and has significantly reduced in size.         Result Review :  The following data was reviewed by: Doreen Alaniz MD on 03/10/2025:    Prior notes and images.               Assessment and Plan   Diagnoses and all orders for this visit:    1. Pressure injury of left buttock, stage 3 (Primary)    2. Protein-calorie malnutrition, unspecified severity        Assessment & Plan  1. Wound on the left buttock.  The wound has shown a reduction in diameter but exhibits a slight increase in depth compared to previous measurements. This discrepancy could be attributed to variations in measurement techniques. The wound is currently small in diameter and does not appear to be producing significant drainage. Silver nitrate was applied to the base of the wound to stimulate healing. The wound will be packed with iodoform strips once daily. If there is an increase in drainage, the frequency of packing can be increased to twice daily. Physical therapy can be initiated, provided it does not involve prolonged sitting on the wound. The wound VAC will be discontinued for a period of 1 week. If the wound enlarges again, the wound VAC will be reordered.    Follow-up  The patient will follow up in 1 week.        Patient was given instructions and counseling regarding their condition or for health maintenance advice, as well as the wound care plan and recommendations. They understand and agree with the plan.  They will follow back up here in the clinic but are instructed to contact us in the interim  should they have any new, returning, or worsening symptoms or concerns. Please see specific information pulled into the AVS if appropriate.     Dragon Dictation utilized for chart completion.    Follow Up   Return in about 1 week (around 3/17/2025).      Doreen Alaniz MD    Patient or patient representative verbalized consent for the use of Ambient Listening during the visit with  Doreen Alaniz MD for chart documentation. 3/10/2025  14:29 EDT

## 2025-03-17 ENCOUNTER — OFFICE VISIT (OUTPATIENT)
Dept: WOUND CARE | Facility: HOSPITAL | Age: 55
End: 2025-03-17
Payer: COMMERCIAL

## 2025-03-17 VITALS
HEART RATE: 80 BPM | RESPIRATION RATE: 18 BRPM | TEMPERATURE: 98.6 F | DIASTOLIC BLOOD PRESSURE: 66 MMHG | SYSTOLIC BLOOD PRESSURE: 98 MMHG

## 2025-03-17 DIAGNOSIS — Z87.828 HISTORY OF SPINAL CORD INJURY: ICD-10-CM

## 2025-03-17 DIAGNOSIS — L89.323 PRESSURE INJURY OF LEFT BUTTOCK, STAGE 3: Primary | ICD-10-CM

## 2025-03-17 PROCEDURE — 97605 NEG PRS WND THER DME<=50SQCM: CPT | Performed by: EMERGENCY MEDICINE

## 2025-03-17 PROCEDURE — 1160F RVW MEDS BY RX/DR IN RCRD: CPT | Performed by: EMERGENCY MEDICINE

## 2025-03-17 PROCEDURE — 1159F MED LIST DOCD IN RCRD: CPT | Performed by: EMERGENCY MEDICINE

## 2025-03-17 NOTE — PROGRESS NOTES
Chief Complaint  Wound Check (Follow up visit for wound to left buttock.  Patient had wound vac and has had wound vac placed on hold for one week.  Patient is not diabetic or on antibiotics at this time.  Patient is paraplegical.  Patient is using Tristian to meet nutritional needs.  Patient is a smoker. )    Subjective      History of Present Illness    Haider Jacobs  is a 54 y.o. male     History of Present Illness  The patient is a 54-year-old male who presents for a recheck of a left buttock wound.    He reports an increase in the size of the wound, which he attributes to the cessation of wound VAC therapy.  His significant other observed a grayish discoloration of the wound after silver nitrate application, followed by skin peeling, leading her to believe the wound had enlarged. The wound has since maintained its size without any further changes.  They have been managing the wound with iodoform gauze strips and he has been avoiding pressure on the area as before. They report no noticeable odor from the wound and feel like it still looks healthy.     Allergies:  Varenicline      Current Outpatient Medications:     baclofen (LIORESAL) 20 MG tablet, Take 1 tablet by mouth 3 (Three) Times a Day., Disp: , Rfl:     busPIRone (BUSPAR) 30 MG tablet, Take 1 tablet by mouth 2 (Two) Times a Day., Disp: , Rfl:     folic acid (FOLVITE) 1 MG tablet, Take 1 tablet by mouth Daily., Disp: , Rfl:     gabapentin (NEURONTIN) 300 MG capsule, Take 1 capsule by mouth 3 (Three) Times a Day., Disp: , Rfl:     hydrOXYzine (ATARAX) 50 MG tablet, Take 1 tablet by mouth 2 (Two) Times a Day As Needed for Itching., Disp: , Rfl:     ibuprofen (ADVIL,MOTRIN) 800 MG tablet, Take 1 tablet by mouth Every 6 (Six) Hours As Needed for Mild Pain., Disp: , Rfl:     Lactobacillus (Florajen Acidophilus) capsule, Take 1 capsule by mouth Daily., Disp: 28 capsule, Rfl: 0    Misc. Devices (Wheelchair Cushion) misc, Use 1 each See Admin Instructions., Disp: 1  each, Rfl: 0    Nutritional Supplements (Tristian) pack, Take 1 packet by mouth 2 (Two) Times a Day., Disp: 60 packet, Rfl: 5    OLANZapine (zyPREXA) 5 MG tablet, Take 1 tablet by mouth Every Night., Disp: , Rfl:     omeprazole (priLOSEC) 40 MG capsule, Take 1 capsule by mouth Daily., Disp: , Rfl:     QUEtiapine (SEROquel) 400 MG tablet, Take 1 tablet by mouth Every Night., Disp: , Rfl:     tamsulosin (FLOMAX) 0.4 MG capsule 24 hr capsule, Take 2 capsules by mouth Daily for 360 days., Disp: 180 capsule, Rfl: 3    traZODone (DESYREL) 150 MG tablet, Take 1 tablet by mouth Every Night., Disp: , Rfl:     Past Medical History:   Diagnosis Date    Anxiety     Chronic tetraplegia     Contracture of hand     LEFT HAND TIGHT HAND CONTRACTURE. RIGHT HAD CAN OPEN 30%.    Drug abuse     Hematuria     Neurogenic bladder     Spinal cord injury at C1-C4 level without spinal injury     Urinary retention      Past Surgical History:   Procedure Laterality Date    ANKLE SURGERY      CERVICAL FUSION      C2-C7    DEBRIDEMENT OF ISCHIAL ULCER/BUTTOCKS WOUND Left 10/30/2024    Procedure: DEBRIDEMENT OF ISCHIAL ULCER/BUTTOCKS WOUND: clean and remove unhealthy tissue from the wound using surgical instruments;  Surgeon: Saeid Quesada MD;  Location: [facility];  Service: General;  Laterality: Left;    FRACTURE SURGERY      ARM    URETEROSCOPY LASER LITHOTRIPSY WITH STENT INSERTION Bilateral 11/21/2023    Procedure: CYSTOSCOPY BILATERAL RETROGRADE PYELOGRAM BLADDER STONE EXTRACTION;  Surgeon: Jennifer Bowman MD;  Location: [facility];  Service: Urology;  Laterality: Bilateral;     Social History     Socioeconomic History    Marital status:    Tobacco Use    Smoking status: Every Day     Current packs/day: 1.00     Types: Cigarettes     Passive exposure: Current    Smokeless tobacco: Never   Vaping Use    Vaping status: Never Used   Substance and Sexual Activity    Alcohol use: Not Currently    Drug use: Yes      "Comment: fentanyl powder /percocets \"off the street\" for managing pain    Sexual activity: Defer           Objective     Vitals:    03/17/25 1359   BP: 98/66   BP Location: Right arm   Patient Position: Sitting   Cuff Size: Adult   Pulse: 80   Resp: 18  Comment: 96% room air   Temp: 98.6 °F (37 °C)   TempSrc: Temporal   PainSc: 5    PainLoc: Buttocks     There is no height or weight on file to calculate BMI.    STEADI Fall Risk Assessment has not been completed.     Review of Systems     ROS:  Per HPI.     I have reviewed the HPI and ROS as documented by MA/RN. Doreen Alaniz MD    Physical Exam     NAD  AAOx3, pleasant, cooperative      Physical Exam  The wound on the left buttock appears healthy, but has increased in diameter and depth.         Result Review :  The following data was reviewed by: Doreen Alaniz MD on 03/17/2025:    Prior notes and images.               Assessment and Plan   Diagnoses and all orders for this visit:    1. Pressure injury of left buttock, stage 3 (Primary)    2. History of spinal cord injury        Assessment & Plan  1. Wound on the left buttock.  The wound appears healthy but has increased in diameter and depth. We will resume the use of the wound VAC, with changes scheduled twice weekly on Mondays and Thursdays.    Follow-up  The patient will follow up in 2 weeks.      Patient was given instructions and counseling regarding their condition or for health maintenance advice, as well as the wound care plan and recommendations. They understand and agree with the plan.  They will follow back up here in the clinic but are instructed to contact us in the interim should they have any new, returning, or worsening symptoms or concerns. Please see specific information pulled into the AVS if appropriate.     Dragon Dictation utilized for chart completion.    Follow Up   Return in about 2 weeks (around 3/31/2025).      Doreen Alaniz MD    Patient or patient representative verbalized consent " for the use of Ambient Listening during the visit with  Doreen Alaniz MD for chart documentation. 3/17/2025  14:42 EDT

## 2025-03-17 NOTE — PROCEDURES
Procedure   Wound Vac Incision    Performed by: Nuria Acosta RN  Authorized by: Doreen Alaniz MD  Associated wounds:   Wound 10/30/24 1122 Left gluteal    Wound Vac:     Type of Foam:  Black    mmH    continuous      Frequency of change:  Patient will only come twice weekly    Patient tolerance:  Good     Skin Barrier film placed on about 12 inches out from wound.  Periwound draped with transparent dressing.  Black foam added to wound base.  Secured with transparent dressing.  + suction at 125mmHG with no leak.  Black foam used to pad left hip from wound vac hose.  Patient tolerated with no complaints.

## 2025-03-20 ENCOUNTER — CLINICAL SUPPORT (OUTPATIENT)
Dept: WOUND CARE | Facility: HOSPITAL | Age: 55
End: 2025-03-20
Payer: COMMERCIAL

## 2025-03-20 VITALS
TEMPERATURE: 97.9 F | SYSTOLIC BLOOD PRESSURE: 150 MMHG | HEART RATE: 73 BPM | DIASTOLIC BLOOD PRESSURE: 90 MMHG | RESPIRATION RATE: 16 BRPM

## 2025-03-20 DIAGNOSIS — L02.31 GLUTEAL ABSCESS: Primary | ICD-10-CM

## 2025-03-20 PROCEDURE — 97605 NEG PRS WND THER DME<=50SQCM: CPT | Performed by: EMERGENCY MEDICINE

## 2025-03-20 NOTE — PROGRESS NOTES
Pt arrived to [facility] today with NPWT VAC off, pt removed VAC prior to arrival VAC alarming blockage and low pressure. Wound cleansed with NS and gauze, queenie-wound prepped with skin prep and transparent film, single layer of collagen ag placed to wound base, wound cavity filled with black foam and tracked to midline side. NPWT set to 125mmHg continuous suction. Pt was educated that when vac is removed to clean wound with NS and gauze, pack wound with plain packing strip, and cover with Silicone border. Pt tolerated well and understood education.     Wound Location: Left Gluteal    Wound Exudate: Moderate  Wound Debridement: Mechanically debrided with normal saline & gauze  Wound Thickness: Full    Cleanse with:  NS or antibacterial soap and water    Primary: Plain packing strip  Secondary:   Secured with: Silicone border  Queenie-wound: Intact    Instructions: Cleanse wound with ns or antibacterial soap and water, pat dry, pack wound with plain packing strip and secure with silicone border.    Education: Educated patient/family member/CG on how to apply new dressings, verbal understanding provided. Patient/family member/CG instructed to call with any questions or concerns

## 2025-03-24 ENCOUNTER — CLINICAL SUPPORT (OUTPATIENT)
Dept: WOUND CARE | Facility: HOSPITAL | Age: 55
End: 2025-03-24
Payer: COMMERCIAL

## 2025-03-24 VITALS
HEART RATE: 92 BPM | DIASTOLIC BLOOD PRESSURE: 67 MMHG | SYSTOLIC BLOOD PRESSURE: 93 MMHG | TEMPERATURE: 99.2 F | RESPIRATION RATE: 18 BRPM

## 2025-03-24 DIAGNOSIS — L02.31 GLUTEAL ABSCESS: Primary | ICD-10-CM

## 2025-03-24 PROCEDURE — 97605 NEG PRS WND THER DME<=50SQCM: CPT | Performed by: EMERGENCY MEDICINE

## 2025-03-24 NOTE — PROGRESS NOTES
Pt arrived to [facility] today with NPWT VAC off, pt removed VAC prior to arrival.. Wound cleansed with NS and gauze, queenie-wound prepped with skin prep and transparent film, single layer of collagen ag placed to wound base, wound cavity filled with black foam and tracked to midline side. NPWT set to 125mmHg continuous suction. Pt was educated that when vac is removed to clean wound with NS and gauze, pack wound with plain packing strip, and cover with Silicone border. Pt tolerated well and understood education.

## 2025-03-25 NOTE — PROGRESS NOTES
"Chief Complaint  Initial Evaluation of the Right Clavicle     Subjective      Haider Jacobs presents to [facility] for an evaluation of right clavicle. Patient present today in a wheelchair for ambulation assistance due to spinal injury, he usually uses a walker. Patient fell down the steps resulting an injury to the clavicle. Injury sustained on 2/4/22.     Allergies   Allergen Reactions   • Varenicline Other (See Comments)     dizziness        Social History     Socioeconomic History   • Marital status: Single   Tobacco Use   • Smoking status: Current Every Day Smoker     Packs/day: 1.00   • Smokeless tobacco: Never Used   Vaping Use   • Vaping Use: Never used   Substance and Sexual Activity   • Alcohol use: Not Currently   • Drug use: Not Currently   • Sexual activity: Defer        Review of Systems     Objective   Vital Signs:   Pulse 67   Ht 172.7 cm (68\")   Wt 77.1 kg (170 lb)   SpO2 98%   BMI 25.85 kg/m²       Physical Exam  Constitutional:       Appearance: Normal appearance. Patient is well-developed and normal weight.   HENT:      Head: Normocephalic.      Right Ear: Hearing and external ear normal.      Left Ear: Hearing and external ear normal.      Nose: Nose normal.   Eyes:      Conjunctiva/sclera: Conjunctivae normal.   Cardiovascular:      Rate and Rhythm: Normal rate.   Pulmonary:      Effort: Pulmonary effort is normal.      Breath sounds: No wheezing or rales.   Abdominal:      Palpations: Abdomen is soft.      Tenderness: There is no abdominal tenderness.   Musculoskeletal:      Cervical back: Normal range of motion.   Skin:     Findings: No rash.   Neurological:      Mental Status: Patient is alert and oriented to person, place, and time.   Psychiatric:         Mood and Affect: Mood and affect normal.         Judgment: Judgment normal.       Ortho Exam      RIGHT CLAVICLE: Tenderness about the clavicle. Bruising. Full flexion and extension of the elbow. " Please schedule pt for annual for any further refills.    LOV: 2/8/24   Sensation grossly intact. Neurovascular intact.  Radial pulse 2+, ulnar pulse 2+.     Procedures      Imaging Results (Most Recent)     Procedure Component Value Units Date/Time    XR Clavicle Right [[id number]] Resulted: 02/17/22 1048     Updated: 02/17/22 1050           Result Review :     X-Ray Report:  Right Clavicle X-Ray  Indication: Evaluation of right clavicle   AP and Lateral view(s)  Findings: Comminuted fracture of the right clavicle.   Prior studies available for comparison: no         XR Shoulder 2+ View Right    Result Date: 2/4/2022  Narrative: PROCEDURE: XR SHOULDER 2+ VW RIGHT  COMPARISON: None  INDICATIONS: RIGHT CLAVICLE, SHOULDER AND SCAPULA PAIN AFTER FALL TODAY  FINDINGS:  There is a comminuted fracture of the distal clavicle.  There is no AC joint separation.  The glenohumeral joint is congruent.      Impression:  Comminuted distal clavicle fracture.  Fracture extends into the acromioclavicular joint.      CARY MCKEON MD       Electronically Signed and Approved By: CARY MCKEON MD on 2/04/2022 at 16:58             Assessment and Plan     DX: Right clavicle fracture     Discussed treatment plans and diagnosis with the patient. Continue icing the clavicle. Repeat films next visit.     Call or return if worsening symptoms.    Follow Up     2-3 weeks.       Patient was given instructions and counseling regarding his condition or for health maintenance advice. Please see specific information pulled into the AVS if appropriate.     Scribed for Sylvia Carrillo MD by Melissa Giles.  02/17/22   10:52 EST        I have personally performed the services described in this document as scribed by the above individual and it is both accurate and complete. Sylvia Carrillo MD 02/18/22

## 2025-03-27 ENCOUNTER — CLINICAL SUPPORT (OUTPATIENT)
Dept: WOUND CARE | Facility: HOSPITAL | Age: 55
End: 2025-03-27
Payer: COMMERCIAL

## 2025-03-27 VITALS
TEMPERATURE: 98.2 F | HEART RATE: 88 BPM | DIASTOLIC BLOOD PRESSURE: 64 MMHG | SYSTOLIC BLOOD PRESSURE: 105 MMHG | RESPIRATION RATE: 16 BRPM

## 2025-03-27 DIAGNOSIS — L02.31 GLUTEAL ABSCESS: Primary | ICD-10-CM

## 2025-03-27 PROCEDURE — 97605 NEG PRS WND THER DME<=50SQCM: CPT | Performed by: EMERGENCY MEDICINE

## 2025-03-31 ENCOUNTER — OFFICE VISIT (OUTPATIENT)
Dept: WOUND CARE | Facility: HOSPITAL | Age: 55
End: 2025-03-31
Payer: COMMERCIAL

## 2025-03-31 VITALS
RESPIRATION RATE: 16 BRPM | TEMPERATURE: 98.1 F | HEART RATE: 80 BPM | DIASTOLIC BLOOD PRESSURE: 64 MMHG | SYSTOLIC BLOOD PRESSURE: 90 MMHG

## 2025-03-31 DIAGNOSIS — L89.323 PRESSURE INJURY OF LEFT BUTTOCK, STAGE 3: Primary | ICD-10-CM

## 2025-03-31 DIAGNOSIS — F17.210 TOBACCO DEPENDENCE DUE TO CIGARETTES: ICD-10-CM

## 2025-03-31 DIAGNOSIS — Z87.828 HISTORY OF SPINAL CORD INJURY: ICD-10-CM

## 2025-03-31 PROCEDURE — 99213 OFFICE O/P EST LOW 20 MIN: CPT | Performed by: EMERGENCY MEDICINE

## 2025-03-31 PROCEDURE — 1160F RVW MEDS BY RX/DR IN RCRD: CPT | Performed by: EMERGENCY MEDICINE

## 2025-03-31 PROCEDURE — 97605 NEG PRS WND THER DME<=50SQCM: CPT | Performed by: EMERGENCY MEDICINE

## 2025-03-31 PROCEDURE — 1159F MED LIST DOCD IN RCRD: CPT | Performed by: EMERGENCY MEDICINE

## 2025-03-31 NOTE — PROGRESS NOTES
Chief Complaint  Wound Check (Pt seen today for wound check. No new complaints at this time. )    Subjective      History of Present Illness    Haider Jacobs  is a 54 y.o. male     History of Present Illness  The patient is a 54-year-old male here for a recheck of a wound on his left ischial tuberosity and left buttock.    He has been receiving wound VAC therapy twice weekly, specifically on Mondays and Thursdays. He has been adhering to the recommended offloading measures for the wound. The wound size remains relatively unchanged, or slightly smaller. He continues to smoke, consuming approximately three-quarters of a pack per day. His dietary intake includes protein powder shakes. He has expressed reluctance towards surgical intervention.    SOCIAL HISTORY  The patient admits to still smoking but is down to less than a pack a day.    Allergies:  Varenicline      Current Outpatient Medications:     baclofen (LIORESAL) 20 MG tablet, Take 1 tablet by mouth 3 (Three) Times a Day., Disp: , Rfl:     busPIRone (BUSPAR) 30 MG tablet, Take 1 tablet by mouth 2 (Two) Times a Day., Disp: , Rfl:     folic acid (FOLVITE) 1 MG tablet, Take 1 tablet by mouth Daily., Disp: , Rfl:     gabapentin (NEURONTIN) 300 MG capsule, Take 1 capsule by mouth 3 (Three) Times a Day., Disp: , Rfl:     hydrOXYzine (ATARAX) 50 MG tablet, Take 1 tablet by mouth 2 (Two) Times a Day As Needed for Itching., Disp: , Rfl:     ibuprofen (ADVIL,MOTRIN) 800 MG tablet, Take 1 tablet by mouth Every 6 (Six) Hours As Needed for Mild Pain., Disp: , Rfl:     Lactobacillus (Florajen Acidophilus) capsule, Take 1 capsule by mouth Daily., Disp: 28 capsule, Rfl: 0    Misc. Devices (Wheelchair Cushion) misc, Use 1 each See Admin Instructions., Disp: 1 each, Rfl: 0    Nutritional Supplements (Tristian) pack, Take 1 packet by mouth 2 (Two) Times a Day., Disp: 60 packet, Rfl: 5    OLANZapine (zyPREXA) 5 MG tablet, Take 1 tablet by mouth Every Night., Disp: , Rfl:      "omeprazole (priLOSEC) 40 MG capsule, Take 1 capsule by mouth Daily., Disp: , Rfl:     QUEtiapine (SEROquel) 400 MG tablet, Take 1 tablet by mouth Every Night., Disp: , Rfl:     tamsulosin (FLOMAX) 0.4 MG capsule 24 hr capsule, Take 2 capsules by mouth Daily for 360 days., Disp: 180 capsule, Rfl: 3    traZODone (DESYREL) 150 MG tablet, Take 1 tablet by mouth Every Night., Disp: , Rfl:     Past Medical History:   Diagnosis Date    Anxiety     Chronic tetraplegia     Contracture of hand     LEFT HAND TIGHT HAND CONTRACTURE. RIGHT HAD CAN OPEN 30%.    Drug abuse     Hematuria     Neurogenic bladder     Spinal cord injury at C1-C4 level without spinal injury     Urinary retention      Past Surgical History:   Procedure Laterality Date    ANKLE SURGERY      CERVICAL FUSION      C2-C7    DEBRIDEMENT OF ISCHIAL ULCER/BUTTOCKS WOUND Left 10/30/2024    Procedure: DEBRIDEMENT OF ISCHIAL ULCER/BUTTOCKS WOUND: clean and remove unhealthy tissue from the wound using surgical instruments;  Surgeon: Saeid Quesada MD;  Location: [facility]  Service: General;  Laterality: Left;    FRACTURE SURGERY      ARM    URETEROSCOPY LASER LITHOTRIPSY WITH STENT INSERTION Bilateral 11/21/2023    Procedure: CYSTOSCOPY BILATERAL RETROGRADE PYELOGRAM BLADDER STONE EXTRACTION;  Surgeon: Jennifer Bowman MD;  Location: [facility]  Service: Urology;  Laterality: Bilateral;     Social History     Socioeconomic History    Marital status:    Tobacco Use    Smoking status: Every Day     Current packs/day: 1.00     Types: Cigarettes     Passive exposure: Current    Smokeless tobacco: Never   Vaping Use    Vaping status: Never Used   Substance and Sexual Activity    Alcohol use: Not Currently    Drug use: Yes     Comment: fentanyl powder /percocets \"off the street\" for managing pain    Sexual activity: Defer           Objective     Vitals:    03/31/25 1432   BP: 90/64   BP Location: Left arm   Patient Position: Lying   Cuff Size: " Adult   Pulse: 80   Resp: 16  Comment: 99% RA   Temp: 98.1 °F (36.7 °C)   TempSrc: Temporal   PainSc: 6    PainLoc: Buttocks     There is no height or weight on file to calculate BMI.    STEADI Fall Risk Assessment has not been completed.     Review of Systems     ROS:  Per HPI.     I have reviewed the HPI and ROS as documented by MA/RN. Doreen Alaniz MD    Physical Exam     NAD  AAOx3, pleasant, cooperative      Physical Exam  The patient's wound on the left ischial tuberosity and left buttock is fairly small but still has some depth. The entire wound appears pink and healthy with no slough, no maceration, and no surrounding deep tissue pressure injury.  Wound is fractionally smaller than at his office visit 2 weeks ago.         Result Review :  The following data was reviewed by: Doreen Alaniz MD on 03/31/2025:    Prior notes and images.               Assessment and Plan   Diagnoses and all orders for this visit:    1. Pressure injury of left buttock, stage 3 (Primary)  -     Wound Vac    2. History of spinal cord injury    3. Tobacco dependence due to cigarettes        Assessment & Plan  1. Wound on the left ischial tuberosity and left buttock.  The wound is demonstrating a gradual healing process, albeit at a slow pace. He is advised to continue with the current treatment regimen, which includes wound VAC therapy twice a week, with collagen with silver applied into the base. He is also encouraged to maintain a high-protein diet, including protein powder shakes, and to exercise caution in applying pressure on the wound at any time. Smoking cessation is strongly recommended as it can impede wound healing. The potential for surgical intervention was discussed, but given the current size of the wound, it may not be the most suitable option at this time and the patient declines referral for evaluation at this time.    Follow-up  The patient will follow up in 1 month.      Patient was given instructions and  counseling regarding their condition or for health maintenance advice, as well as the wound care plan and recommendations. They understand and agree with the plan.  They will follow back up here in the clinic but are instructed to contact us in the interim should they have any new, returning, or worsening symptoms or concerns. Please see specific information pulled into the AVS if appropriate.     Dragon Dictation utilized for chart completion.    Follow Up   Return in about 4 weeks (around 4/28/2025).      Doreen Alaniz MD    Patient or patient representative verbalized consent for the use of Ambient Listening during the visit with  Doreen Alaniz MD for chart documentation. 3/31/2025  15:10 EDT

## 2025-04-03 ENCOUNTER — CLINICAL SUPPORT (OUTPATIENT)
Dept: WOUND CARE | Facility: HOSPITAL | Age: 55
End: 2025-04-03
Payer: COMMERCIAL

## 2025-04-03 VITALS
DIASTOLIC BLOOD PRESSURE: 66 MMHG | TEMPERATURE: 98.3 F | RESPIRATION RATE: 18 BRPM | HEART RATE: 75 BPM | SYSTOLIC BLOOD PRESSURE: 101 MMHG

## 2025-04-03 DIAGNOSIS — L02.31 GLUTEAL ABSCESS: Primary | ICD-10-CM

## 2025-04-03 PROCEDURE — 97605 NEG PRS WND THER DME<=50SQCM: CPT | Performed by: EMERGENCY MEDICINE

## 2025-04-07 ENCOUNTER — CLINICAL SUPPORT (OUTPATIENT)
Dept: WOUND CARE | Facility: HOSPITAL | Age: 55
End: 2025-04-07
Payer: COMMERCIAL

## 2025-04-07 VITALS
SYSTOLIC BLOOD PRESSURE: 99 MMHG | HEART RATE: 80 BPM | DIASTOLIC BLOOD PRESSURE: 61 MMHG | TEMPERATURE: 98 F | RESPIRATION RATE: 16 BRPM

## 2025-04-07 DIAGNOSIS — L02.31 GLUTEAL ABSCESS: Primary | ICD-10-CM

## 2025-04-07 PROCEDURE — 97605 NEG PRS WND THER DME<=50SQCM: CPT | Performed by: NURSE PRACTITIONER

## 2025-04-07 NOTE — PROGRESS NOTES
Pt arrived to [facility] today with dressing in place over wound, vac was removed on 4/6/25 by wife for shower. Wound cleansed with NS and gauze, queenie-wound prepped with skin prep and transparent film, single layer of collagen ag placed to wound base, wound cavity filled with black foam and tracked to midline side. NPWT set to 125mmHg continuous suction. Pt was educated that when vac is removed to clean wound with NS and gauze, pack wound with plain packing strip, and cover with Silicone border. Pt tolerated well and understood education.

## 2025-04-07 NOTE — PROCEDURES
Procedure   Wound Vac Incision    Performed by: Jerica Loera, RN  Authorized by: Indu Sales APRN  Associated wounds:   Wound 10/30/24 1122 Left gluteal    Wound Vac:     Type of Foam:  Black    mmH    continuous      Frequency of change:  2 times weekly    Patient tolerance:  Good

## 2025-04-11 ENCOUNTER — CLINICAL SUPPORT (OUTPATIENT)
Dept: WOUND CARE | Facility: HOSPITAL | Age: 55
End: 2025-04-11
Payer: COMMERCIAL

## 2025-04-11 VITALS
HEART RATE: 82 BPM | RESPIRATION RATE: 16 BRPM | SYSTOLIC BLOOD PRESSURE: 101 MMHG | DIASTOLIC BLOOD PRESSURE: 71 MMHG | TEMPERATURE: 97.8 F

## 2025-04-11 DIAGNOSIS — L02.31 GLUTEAL ABSCESS: Primary | ICD-10-CM

## 2025-04-11 PROCEDURE — 97605 NEG PRS WND THER DME<=50SQCM: CPT | Performed by: NURSE PRACTITIONER

## 2025-04-11 NOTE — PROCEDURES
Procedure   Wound Vac Incision    Performed by: Mery Frank RN  Authorized by: Francie Kevin APRN  Associated wounds:   Wound 10/30/24 1122 Left gluteal    Wound Vac:     Type of Foam:  Black    mmH    continuous      Frequency of change:  Three times per week    Patient tolerance:  Good

## 2025-04-11 NOTE — PROGRESS NOTES
Pt arrived to [facility] today with dressing in place over wound. Wound cleansed with NS and gauze, queenie-wound prepped with skin prep and transparent film, single layer of collagen ag placed to wound base, wound cavity filled with black foam and tracked to midline side. NPWT set to 125mmHg continuous suction. Pt was educated that when vac is removed to clean wound with NS and gauze, pack wound with plain packing strip, and cover with Silicone border. Pt tolerated well and understood education.

## 2025-04-14 ENCOUNTER — CLINICAL SUPPORT (OUTPATIENT)
Dept: WOUND CARE | Facility: HOSPITAL | Age: 55
End: 2025-04-14
Payer: COMMERCIAL

## 2025-04-14 VITALS
HEART RATE: 78 BPM | SYSTOLIC BLOOD PRESSURE: 130 MMHG | RESPIRATION RATE: 18 BRPM | DIASTOLIC BLOOD PRESSURE: 81 MMHG | TEMPERATURE: 98.3 F

## 2025-04-14 DIAGNOSIS — L02.31 GLUTEAL ABSCESS: Primary | ICD-10-CM

## 2025-04-14 PROCEDURE — 97605 NEG PRS WND THER DME<=50SQCM: CPT | Performed by: EMERGENCY MEDICINE

## 2025-04-17 ENCOUNTER — CLINICAL SUPPORT (OUTPATIENT)
Dept: WOUND CARE | Facility: HOSPITAL | Age: 55
End: 2025-04-17
Payer: COMMERCIAL

## 2025-04-17 VITALS
SYSTOLIC BLOOD PRESSURE: 101 MMHG | DIASTOLIC BLOOD PRESSURE: 62 MMHG | HEART RATE: 67 BPM | RESPIRATION RATE: 18 BRPM | TEMPERATURE: 98.7 F

## 2025-04-17 DIAGNOSIS — L02.31 GLUTEAL ABSCESS: Primary | ICD-10-CM

## 2025-04-17 PROCEDURE — 97605 NEG PRS WND THER DME<=50SQCM: CPT | Performed by: EMERGENCY MEDICINE

## 2025-04-17 NOTE — PROCEDURES
Procedure   Wound Vac Incision    Performed by: Teir Nieto RN  Authorized by: Doreen Alaniz MD  Associated wounds:   Wound 10/30/24 1122 Left gluteal    Wound Vac:     Type of Foam:  Black and White    mmH    continuous      Frequency of change:  2 times weekly    Patient tolerance:  Good

## 2025-04-17 NOTE — PROGRESS NOTES
Pt arrived to O[facility] today with wound vac in place. Wound vac removed and wound cleansed with NS and gauze, queenie-wound prepped with skin prep and transparent film, single layer of collagen ag placed to wound base, wound base filled with with white foam and the remainder black foam and tracked to midline side. NPWT set to 125mmHg continuous suction. Pt was educated that when vac is removed to clean wound with NS and gauze, pack wound with plain packing strip, and cover with Silicone border. Pt tolerated well and understood education.

## 2025-04-21 ENCOUNTER — CLINICAL SUPPORT (OUTPATIENT)
Dept: WOUND CARE | Facility: HOSPITAL | Age: 55
End: 2025-04-21
Payer: COMMERCIAL

## 2025-04-21 VITALS
RESPIRATION RATE: 18 BRPM | DIASTOLIC BLOOD PRESSURE: 55 MMHG | TEMPERATURE: 97.5 F | SYSTOLIC BLOOD PRESSURE: 109 MMHG | HEART RATE: 63 BPM

## 2025-04-21 DIAGNOSIS — L02.31 GLUTEAL ABSCESS: Primary | ICD-10-CM

## 2025-04-21 PROCEDURE — 97605 NEG PRS WND THER DME<=50SQCM: CPT | Performed by: EMERGENCY MEDICINE

## 2025-04-21 NOTE — PROGRESS NOTES
Pt arrived to [facility] today with wound vac off. Wound dressing removed and wound cleansed with NS and gauze, queenie-wound prepped with skin prep and transparent film, single layer of collagen ag placed to wound base, wound base filled with with white foam and the remainder black foam and tracked to midline side. NPWT set to 125mmHg continuous suction. Pt was educated that when vac is removed to clean wound with NS and gauze, pack wound with plain packing strip, and cover with Silicone border. Pt tolerated well and understood education.

## 2025-04-21 NOTE — PROCEDURES
Procedure   Wound Vac Incision    Performed by: Teri Nieto RN  Authorized by: Doreen Alaniz MD  Associated wounds:   Wound 10/30/24 1122 Left gluteal    Wound Vac:     Type of Foam:  White and Black    mmH    continuous      Frequency of change:  2 times weekly    Patient tolerance:  Good

## 2025-04-23 NOTE — PROGRESS NOTES
/[facility] ED Assessment/Plan of Care Note     Baseline Assessment  79 year old presented to the ED with   Chief Complaint   Patient presents with    Urinary Frequency    Dizziness    Fall    Buttocks Pain     Received referral from [place] [facility] for ISAR score 3. 79 year old seen in ED 4/23/2025 as Emergency with a diagnosis of UTI.  Prior to admission patient was living with other facility residents at [facility] care unit.  Patient has a Legal Guardian, designated guardian is Kal Adair, son [phone] . Patient’s Primary Care Provider is No primary care provider on file..      Medical History  Past Medical History:   Diagnosis Date    Allergic conjunctivitis     Amblyopia     Anisometropia     Anxiety     Arthritis     COVID     Diabetes mellitus  (CMD)     Essential (primary) hypertension     Low back pain     Presbyopia     RAD (reactive airway disease) (CMD)     Restless leg syndrome     Sleep apnea     Subdural hematoma  (CMD)     Tibialis tendinitis      Insurance  Primary: [facility] MEDICARE ADVANTAGE  Secondary: [facility]    Cherelle Mendoza CM ph: [phone].     Progress Note  Chart reviewed. Patient ambulates with walker at baseline and is independent with most ADLs at facility. Writer reached out to [facility] regarding transportation resources.     Per [facility] due to their contract with [facility] they are unable to approve transportation outside of facility contract. With that the expectation is that the facility will provide transportation. SUBHASH Hagan spoke with facility they do no provide transportation. Patient to be returned to [facility] via EMS for safety. Cherelle updated on ED course and discharge plan.      Plan of Care  Plan to admit: NO    Follow up to be scheduled and completed by assisted living facility.     If NO:  Does patient require resources on discharge: Yes  Resources provided/arranged: transportation   "Physical Therapy Daily Progress Note        Patient: Haider Jacobs   : 1970  Diagnosis/ICD-10 Code:  Pain of right clavicle [M89.8X1]  Referring practitioner: Sylvia Carrillo MD  Date of Initial Visit: Type: THERAPY  Noted: 3/30/2022  Today's Date: 2022  Patient seen for 5 sessions             Subjective   Haider Jacobs reports that he has 8/10 \"All over \" Pain.  He states that his right shoulder has been feeling pretty good.    Objective     Gt Observations:    Trial of ambulation with Right Platform rolling walker 50 ft X2- CGA/SPV.  Pt exhibited improved- More fluid gait pattern with more erect posture and improved Lower Extremity clearance.    See Exercise Logs for complete treatment.       Assessment/Plan     Pt tolerated therapy session moderately  well - with progression of therapeutic exercises, Functional activities, and Manual therapy. He  continues to have deficits in Right Shoulder, Trunk, and Bilateral Lower Extremity ROM,  Strength, and Stability; limiting functional  Mobility  and ability to perform ADLs at this time.  He also continues to be at increased risk for falls secondary to significant weakness in bilateral Lower Extremities. Good response to trial of ambulation with Right platform rolling walker today.  Gt improved - as evidenced by more erect posture, improved bilateral Lower extremity clearance, and less stress noted on bilateral Upper Extremities.       Progress per Plan of Care           Timed:  Manual Therapy:    0     mins  79009;  Therapeutic Exercise:    30     mins  61531;     Neuromuscular Karina:    0    mins  19855;    Therapeutic Activity:     10     mins  99697;     Gait Trainin     mins  26034;     Ultrasound:     0     mins  55121;    Electrical Stimulation:    0     mins  62718;  Iontophoresis     0     mins  85159    Untimed:  Electrical Stimulation:    0     mins  54669 ( );  Mechanical Traction:    0     mins  76424;   Fluidotherapy     0     " mins  61634  Hot pack     0     mins  10917  Cold pack     0     mins  18566    Timed Treatment:   48   mins   Total Treatment:     48   mins        Catia Walker PTA  Physical Therapist Assistant

## 2025-04-24 ENCOUNTER — CLINICAL SUPPORT (OUTPATIENT)
Dept: WOUND CARE | Facility: HOSPITAL | Age: 55
End: 2025-04-24
Payer: COMMERCIAL

## 2025-04-24 VITALS
DIASTOLIC BLOOD PRESSURE: 62 MMHG | SYSTOLIC BLOOD PRESSURE: 102 MMHG | RESPIRATION RATE: 18 BRPM | HEART RATE: 72 BPM | TEMPERATURE: 98.2 F

## 2025-04-24 DIAGNOSIS — L02.31 GLUTEAL ABSCESS: Primary | ICD-10-CM

## 2025-04-24 PROCEDURE — 97605 NEG PRS WND THER DME<=50SQCM: CPT | Performed by: EMERGENCY MEDICINE

## 2025-04-24 NOTE — PROGRESS NOTES
Patient arrived to wound clinic with NPWT off, packing and dressing in place. Patient stated the wound VAC was removed 'A few hours prior to arrival'.  The dressing was removed, wound cleansed with NS, collagen Ag placed in base of wound and tunnel, white foam placed in narrow tunnel, wound cavity filled with black foam and set to 125mmHg continuous suction. Pt tolerated well.

## 2025-04-24 NOTE — PROCEDURES
Procedure   Wound Vac Incision    Performed by: Idalia Luong RN  Authorized by: Doreen Alaniz MD  Associated wounds:   Wound 10/30/24 1122 Left gluteal    Wound Vac:     Type of Foam:  Black and White    mmH    continuous      Frequency of change:  2x weekly    Patient tolerance:  Good

## 2025-04-28 ENCOUNTER — OFFICE VISIT (OUTPATIENT)
Dept: WOUND CARE | Facility: HOSPITAL | Age: 55
End: 2025-04-28
Payer: COMMERCIAL

## 2025-04-28 VITALS
HEART RATE: 82 BPM | SYSTOLIC BLOOD PRESSURE: 93 MMHG | RESPIRATION RATE: 16 BRPM | DIASTOLIC BLOOD PRESSURE: 63 MMHG | TEMPERATURE: 97.8 F

## 2025-04-28 DIAGNOSIS — E46 PROTEIN-CALORIE MALNUTRITION, UNSPECIFIED SEVERITY: ICD-10-CM

## 2025-04-28 DIAGNOSIS — Z87.828 HISTORY OF SPINAL CORD INJURY: ICD-10-CM

## 2025-04-28 DIAGNOSIS — L89.323 PRESSURE INJURY OF LEFT BUTTOCK, STAGE 3: Primary | ICD-10-CM

## 2025-04-28 PROCEDURE — 1160F RVW MEDS BY RX/DR IN RCRD: CPT | Performed by: EMERGENCY MEDICINE

## 2025-04-28 PROCEDURE — G0463 HOSPITAL OUTPT CLINIC VISIT: HCPCS | Performed by: EMERGENCY MEDICINE

## 2025-04-28 PROCEDURE — 99213 OFFICE O/P EST LOW 20 MIN: CPT | Performed by: EMERGENCY MEDICINE

## 2025-04-28 PROCEDURE — 1159F MED LIST DOCD IN RCRD: CPT | Performed by: EMERGENCY MEDICINE

## 2025-04-28 NOTE — PROGRESS NOTES
Chief Complaint  Wound Check (Pt here today for wound check, no new concernss)    Subjective      History of Present Illness    Haider Jacobs  is a 54 y.o. male     History of Present Illness  The patient is a 54-year-old male here for a recheck of a left buttock wound. He has been receiving a wound VAC 3 times a week. He would like to be able to be finished with the wound VAC as it is hindering his ability to do things he wants to do and engage in physical therapy. He is accompanied by his wife.    The wound VAC was removed yesterday as per the instructions given on Thursday. The wound has not shown any signs of sealing over. He reports that the wound size remains unchanged. He expresses a desire to discontinue the use of the wound VAC due to increasing weakness, which necessitates assistance from his wife for all activities. He is interested in exploring other treatment options. He plans to attend physical therapy sessions twice a week. He recalls an incident where he was unable to rise from a seated position after sitting for approximately 5 hours. He typically uses a Roho cushion for support. His wife has observed that the wound appears to enlarge during showers, possibly due to the heat of the water. He uses a shower chair and notes that the towel he sits on becomes hard when wet. His mobility is limited, with daily activity primarily consisting of getting up once to eat before returning to bed. He believes that strengthening his gluteal muscles through physical therapy could potentially alleviate some of the pressure on the wound. However, he currently requires assistance from his wife to navigate a single step into their home using a wheelchair.    Allergies:  Varenicline      Current Outpatient Medications:     baclofen (LIORESAL) 20 MG tablet, Take 1 tablet by mouth 3 (Three) Times a Day., Disp: , Rfl:     busPIRone (BUSPAR) 30 MG tablet, Take 1 tablet by mouth 2 (Two) Times a Day., Disp: , Rfl:     folic  acid (FOLVITE) 1 MG tablet, Take 1 tablet by mouth Daily., Disp: , Rfl:     gabapentin (NEURONTIN) 300 MG capsule, Take 1 capsule by mouth 3 (Three) Times a Day., Disp: , Rfl:     hydrOXYzine (ATARAX) 50 MG tablet, Take 1 tablet by mouth 2 (Two) Times a Day As Needed for Itching., Disp: , Rfl:     ibuprofen (ADVIL,MOTRIN) 800 MG tablet, Take 1 tablet by mouth Every 6 (Six) Hours As Needed for Mild Pain., Disp: , Rfl:     Lactobacillus (Florajen Acidophilus) capsule, Take 1 capsule by mouth Daily., Disp: 28 capsule, Rfl: 0    Misc. Devices (Wheelchair Cushion) misc, Use 1 each See Admin Instructions., Disp: 1 each, Rfl: 0    Nutritional Supplements (Tristian) pack, Take 1 packet by mouth 2 (Two) Times a Day., Disp: 60 packet, Rfl: 5    OLANZapine (zyPREXA) 5 MG tablet, Take 1 tablet by mouth Every Night., Disp: , Rfl:     omeprazole (priLOSEC) 40 MG capsule, Take 1 capsule by mouth Daily., Disp: , Rfl:     QUEtiapine (SEROquel) 400 MG tablet, Take 1 tablet by mouth Every Night., Disp: , Rfl:     tamsulosin (FLOMAX) 0.4 MG capsule 24 hr capsule, Take 2 capsules by mouth Daily for 360 days., Disp: 180 capsule, Rfl: 3    traZODone (DESYREL) 150 MG tablet, Take 1 tablet by mouth Every Night., Disp: , Rfl:     Past Medical History:   Diagnosis Date    Anxiety     Chronic tetraplegia     Contracture of hand     LEFT HAND TIGHT HAND CONTRACTURE. RIGHT HAD CAN OPEN 30%.    Drug abuse     Hematuria     Neurogenic bladder     Spinal cord injury at C1-C4 level without spinal injury     Urinary retention      Past Surgical History:   Procedure Laterality Date    ANKLE SURGERY      CERVICAL FUSION      C2-C7    DEBRIDEMENT OF ISCHIAL ULCER/BUTTOCKS WOUND Left 10/30/2024    Procedure: DEBRIDEMENT OF ISCHIAL ULCER/BUTTOCKS WOUND: clean and remove unhealthy tissue from the wound using surgical instruments;  Surgeon: Saeid Quesada MD;  Location: [facility] OR;  Service: General;  Laterality: Left;    FRACTURE SURGERY      ARM  "   URETEROSCOPY LASER LITHOTRIPSY WITH STENT INSERTION Bilateral 11/21/2023    Procedure: CYSTOSCOPY BILATERAL RETROGRADE PYELOGRAM BLADDER STONE EXTRACTION;  Surgeon: Jennifer Bowman MD;  Location: [facility] OR;  Service: Urology;  Laterality: Bilateral;     Social History     Socioeconomic History    Marital status:    Tobacco Use    Smoking status: Every Day     Current packs/day: 1.00     Types: Cigarettes     Passive exposure: Current    Smokeless tobacco: Never   Vaping Use    Vaping status: Never Used   Substance and Sexual Activity    Alcohol use: Not Currently    Drug use: Yes     Comment: fentanyl powder /percocets \"off the street\" for managing pain    Sexual activity: Defer           Objective     Vitals:    04/28/25 1420   BP: 93/63   BP Location: Right arm   Patient Position: Sitting   Cuff Size: Adult   Pulse: 82   Resp: 16  Comment: 96% RA   Temp: 97.8 °F (36.6 °C)   TempSrc: Temporal   PainSc: 5    PainLoc: Buttocks     There is no height or weight on file to calculate BMI.    STEADI Fall Risk Assessment has not been completed.     Review of Systems     ROS:  Per HPI.     I have reviewed the HPI and ROS as documented by MA/RN. Doreen Alaniz MD    Physical Exam     NAD  AAOx3, pleasant, cooperative    Physical Exam  The left ischial wound is similar, although there is slight extension of the posterior aspect. No slough, no surrounding deep tissue pressure injury, no evidence of infection.         Result Review :  The following data was reviewed by: Doreen Alaniz MD on 04/28/2025:    Prior notes and images.               Assessment and Plan   Diagnoses and all orders for this visit:    1. Pressure injury of left buttock, stage 3 (Primary)    2. History of spinal cord injury    3. Protein-calorie malnutrition, unspecified severity        Assessment & Plan  1. Left ischial wound.  The wound VAC will be temporarily discontinued. Collagen with silver will be applied to the base of the " wound, followed by a minimal amount of plain packing to maintain the collagen's position. It is crucial that the packing is not excessively tight. A U-shaped cutout cushion is recommended to alleviate pressure on the wound. He is advised to avoid sitting directly on the wound and to use a wedge pillow to lift the hip and thigh if necessary. Physical therapy is encouraged to strengthen the buttock muscles and reduce pressure on the wound.    Follow-up  The patient will follow up in 2 weeks.      Patient was given instructions and counseling regarding their condition or for health maintenance advice, as well as the wound care plan and recommendations. They understand and agree with the plan.  They will follow back up here in the clinic but are instructed to contact us in the interim should they have any new, returning, or worsening symptoms or concerns. Please see specific information pulled into the AVS if appropriate.     Dragon Dictation utilized for chart completion.    Follow Up   Return in about 2 weeks (around 5/12/2025).      Doreen Alaniz MD    Patient or patient representative verbalized consent for the use of Ambient Listening during the visit with  Doreen Alaniz MD for chart documentation. 4/28/2025  14:59 EDT

## 2025-04-28 NOTE — PROGRESS NOTES
"Wound Location: Left Glute    Wound Exudate: moderate  Wound Debridement: Mechanically debrided with normal saline & gauze  Wound Thickness: full    Cleanse with:  NS or antibacterial soap and water    Primary: Collagen Ag  Secondary: 1/4\" Plain Packing strip  Secured with: Silicone bordered dressing   Harper-wound: Dry     Instructions: Cleanse wound with ns or antibacterial soap and water, pat dry, place Collagen Ag to wound base, lightly fill the wound cavity with 1/4\" plain packing strip, cover and secure with silicone bordered dressing. Change Daily.     Education: Educated patient/family member/CG on how to apply new dressings, verbal understanding provided. Patient/family member/CG instructed to call with any questions or concerns  "

## 2025-05-12 ENCOUNTER — OFFICE VISIT (OUTPATIENT)
Dept: WOUND CARE | Facility: HOSPITAL | Age: 55
End: 2025-05-12
Payer: COMMERCIAL

## 2025-05-12 VITALS
DIASTOLIC BLOOD PRESSURE: 63 MMHG | TEMPERATURE: 97.5 F | SYSTOLIC BLOOD PRESSURE: 106 MMHG | HEART RATE: 67 BPM | RESPIRATION RATE: 18 BRPM

## 2025-05-12 DIAGNOSIS — Z87.828 HISTORY OF SPINAL CORD INJURY: ICD-10-CM

## 2025-05-12 DIAGNOSIS — L89.323 PRESSURE INJURY OF LEFT BUTTOCK, STAGE 3: Primary | ICD-10-CM

## 2025-05-12 PROCEDURE — 1159F MED LIST DOCD IN RCRD: CPT | Performed by: EMERGENCY MEDICINE

## 2025-05-12 PROCEDURE — G0463 HOSPITAL OUTPT CLINIC VISIT: HCPCS | Performed by: EMERGENCY MEDICINE

## 2025-05-12 PROCEDURE — 1160F RVW MEDS BY RX/DR IN RCRD: CPT | Performed by: EMERGENCY MEDICINE

## 2025-05-12 PROCEDURE — 99213 OFFICE O/P EST LOW 20 MIN: CPT | Performed by: EMERGENCY MEDICINE

## 2025-05-12 NOTE — PROGRESS NOTES
Chief Complaint  Wound Check (PT seen today for wound check. No new complaints at this time. Wife is not having any difficulty preforming wound care. )    Subjective      History of Present Illness    Haider Jacobs  is a 54 y.o. male     History of Present Illness  The patient is a 54-year-old male who presents for a recheck of a left buttock ulcer. He is accompanied by his wife who does his dressing changes.    The patient's wife reports that the ulcer has significantly reduced in size. She has been applying collagen with silver to the wound, which she believes has contributed to its healing. However, she expresses concern about not wanting to cause any damage by packing the wound.  She has been removing any undissolved collagen during each dressing change. The wound has been present for approximately 7 months.    Allergies:  Varenicline      Current Outpatient Medications:     baclofen (LIORESAL) 20 MG tablet, Take 1 tablet by mouth 3 (Three) Times a Day., Disp: , Rfl:     busPIRone (BUSPAR) 30 MG tablet, Take 1 tablet by mouth 2 (Two) Times a Day., Disp: , Rfl:     folic acid (FOLVITE) 1 MG tablet, Take 1 tablet by mouth Daily., Disp: , Rfl:     gabapentin (NEURONTIN) 300 MG capsule, Take 1 capsule by mouth 3 (Three) Times a Day., Disp: , Rfl:     hydrOXYzine (ATARAX) 50 MG tablet, Take 1 tablet by mouth 2 (Two) Times a Day As Needed for Itching., Disp: , Rfl:     ibuprofen (ADVIL,MOTRIN) 800 MG tablet, Take 1 tablet by mouth Every 6 (Six) Hours As Needed for Mild Pain., Disp: , Rfl:     Lactobacillus (Florajen Acidophilus) capsule, Take 1 capsule by mouth Daily., Disp: 28 capsule, Rfl: 0    Misc. Devices (Wheelchair Cushion) misc, Use 1 each See Admin Instructions., Disp: 1 each, Rfl: 0    Nutritional Supplements (Tristian) pack, Take 1 packet by mouth 2 (Two) Times a Day., Disp: 60 packet, Rfl: 5    OLANZapine (zyPREXA) 5 MG tablet, Take 1 tablet by mouth Every Night., Disp: , Rfl:     omeprazole (priLOSEC) 40 MG  "capsule, Take 1 capsule by mouth Daily., Disp: , Rfl:     QUEtiapine (SEROquel) 400 MG tablet, Take 1 tablet by mouth Every Night., Disp: , Rfl:     tamsulosin (FLOMAX) 0.4 MG capsule 24 hr capsule, Take 2 capsules by mouth Daily for 360 days., Disp: 180 capsule, Rfl: 3    traZODone (DESYREL) 150 MG tablet, Take 1 tablet by mouth Every Night., Disp: , Rfl:     Past Medical History:   Diagnosis Date    Anxiety     Chronic tetraplegia     Contracture of hand     LEFT HAND TIGHT HAND CONTRACTURE. RIGHT HAD CAN OPEN 30%.    Drug abuse     Hematuria     Neurogenic bladder     Spinal cord injury at C1-C4 level without spinal injury     Urinary retention      Past Surgical History:   Procedure Laterality Date    ANKLE SURGERY      CERVICAL FUSION      C2-C7    DEBRIDEMENT OF ISCHIAL ULCER/BUTTOCKS WOUND Left 10/30/2024    Procedure: DEBRIDEMENT OF ISCHIAL ULCER/BUTTOCKS WOUND: clean and remove unhealthy tissue from the wound using surgical instruments;  Surgeon: Saeid Quesada MD;  Location: [facility];  Service: General;  Laterality: Left;    FRACTURE SURGERY      ARM    URETEROSCOPY LASER LITHOTRIPSY WITH STENT INSERTION Bilateral 11/21/2023    Procedure: CYSTOSCOPY BILATERAL RETROGRADE PYELOGRAM BLADDER STONE EXTRACTION;  Surgeon: Jennifer Bowman MD;  Location: [facility] OR;  Service: Urology;  Laterality: Bilateral;     Social History     Socioeconomic History    Marital status:    Tobacco Use    Smoking status: Every Day     Current packs/day: 1.00     Types: Cigarettes     Passive exposure: Current    Smokeless tobacco: Never   Vaping Use    Vaping status: Never Used   Substance and Sexual Activity    Alcohol use: Not Currently    Drug use: Yes     Comment: fentanyl powder /percocets \"off the street\" for managing pain    Sexual activity: Defer           Objective     Vitals:    05/12/25 1435   BP: 106/63   BP Location: Right arm   Patient Position: Lying   Cuff Size: Adult   Pulse: 67   Resp: " 18  Comment: 96% ra   Temp: 97.5 °F (36.4 °C)   TempSrc: Temporal   PainSc: 3    PainLoc: Buttocks     There is no height or weight on file to calculate BMI.    STEADI Fall Risk Assessment has not been completed.     Review of Systems     ROS:  Per HPI.     I have reviewed the HPI and ROS as documented by MA/RN. Doreen Alaniz MD    Physical Exam     NAD  AAOx3, pleasant, cooperative      Physical Exam  There is a very small pinpoint open wound on the left ischium with no surrounding deep tissue pressure injury. The wound is significantly narrower and less than 1 cm in depth.         Result Review :  The following data was reviewed by: Doreen Alaniz MD on 05/12/2025:    Prior notes and images.               Assessment and Plan   Diagnoses and all orders for this visit:    1. Pressure injury of left buttock, stage 3 (Primary)    2. History of spinal cord injury        Assessment & Plan  1. Ulceration on the left buttock.  The ulceration is showing significant improvement, with the wound nearly closed. He has been advised to continue the application of a thin strip of Meagan Ag or equivalent collagen with silver, ensuring it is fully dissolved before adding more. The outer dressing can be changed as needed. The use of the wound VAC will be discontinued, but he should retain the bed and mattress to prevent recurrence of pressure injuries.    Follow-up  The patient will follow up in 2 weeks.      Patient was given instructions and counseling regarding their condition or for health maintenance advice, as well as the wound care plan and recommendations. They understand and agree with the plan.  They will follow back up here in the clinic but are instructed to contact us in the interim should they have any new, returning, or worsening symptoms or concerns. Please see specific information pulled into the AVS if appropriate.     Dragon Dictation utilized for chart completion.    Follow Up   Return in about 2 weeks (around  5/26/2025).      Doreen Alaniz MD    Patient or patient representative verbalized consent for the use of Ambient Listening during the visit with  Doreen Alaniz MD for chart documentation. 5/12/2025  16:52 EDT

## 2025-05-13 NOTE — PROGRESS NOTES
Wound Location: Left Buttock    Wound Exudate: Moderate  Wound Debridement: Mechanically debrided with normal saline & gauze  Wound Thickness: Full    Cleanse with:  NS or antibacterial soap and water    Primary: Collagen  Secondary:  Secured with: Silicone border  Harper-wound: Intact    Instructions: Cleanse wound with ns or antibacterial soap and water, pat dry, apply collagen Ag to base and secure with silicone border.     Education: Educated patient/family member/CG on how to apply new dressings, verbal understanding provided. Patient/family member/CG instructed to call with any questions or concerns

## 2025-05-27 ENCOUNTER — OFFICE VISIT (OUTPATIENT)
Dept: WOUND CARE | Facility: HOSPITAL | Age: 55
End: 2025-05-27
Payer: COMMERCIAL

## 2025-05-27 VITALS
RESPIRATION RATE: 18 BRPM | TEMPERATURE: 97.6 F | SYSTOLIC BLOOD PRESSURE: 106 MMHG | DIASTOLIC BLOOD PRESSURE: 59 MMHG | HEART RATE: 80 BPM

## 2025-05-27 DIAGNOSIS — L89.323 PRESSURE INJURY OF LEFT BUTTOCK, STAGE 3: Primary | ICD-10-CM

## 2025-05-27 DIAGNOSIS — F17.210 TOBACCO DEPENDENCE DUE TO CIGARETTES: ICD-10-CM

## 2025-05-27 DIAGNOSIS — Z87.828 HISTORY OF SPINAL CORD INJURY: ICD-10-CM

## 2025-05-27 PROCEDURE — 1159F MED LIST DOCD IN RCRD: CPT | Performed by: EMERGENCY MEDICINE

## 2025-05-27 PROCEDURE — 1160F RVW MEDS BY RX/DR IN RCRD: CPT | Performed by: EMERGENCY MEDICINE

## 2025-05-27 PROCEDURE — G0463 HOSPITAL OUTPT CLINIC VISIT: HCPCS | Performed by: EMERGENCY MEDICINE

## 2025-05-27 PROCEDURE — 99213 OFFICE O/P EST LOW 20 MIN: CPT | Performed by: EMERGENCY MEDICINE

## 2025-05-27 NOTE — PROGRESS NOTES
Chief Complaint  Wound Check (Follow-up on Left Buttock wound)    Subjective      History of Present Illness    Haider Jacobs  is a 54 y.o. male     History of Present Illness  The patient is a 54-year-old male here for a recheck of a left ischial/buttock wound.  He is accompanied by his wife who does his dressing changes for him.    He has been applying collagen to the wound, which subsequently developed a scab. He then discontinued the use of collagen and began using Aquaphor around the wound, avoiding direct application on the wound itself. He expresses concern about the potential presence of a hole under the scab and seeks advice on whether to continue the use of collagen.  He continues to try to diligently offload the wound.    MEDICATIONS  Aquaphor    Allergies:  Varenicline      Current Outpatient Medications:     baclofen (LIORESAL) 20 MG tablet, Take 1 tablet by mouth 3 (Three) Times a Day., Disp: , Rfl:     busPIRone (BUSPAR) 30 MG tablet, Take 1 tablet by mouth 2 (Two) Times a Day., Disp: , Rfl:     folic acid (FOLVITE) 1 MG tablet, Take 1 tablet by mouth Daily., Disp: , Rfl:     gabapentin (NEURONTIN) 300 MG capsule, Take 1 capsule by mouth 3 (Three) Times a Day., Disp: , Rfl:     hydrOXYzine (ATARAX) 50 MG tablet, Take 1 tablet by mouth 2 (Two) Times a Day As Needed for Itching., Disp: , Rfl:     ibuprofen (ADVIL,MOTRIN) 800 MG tablet, Take 1 tablet by mouth Every 6 (Six) Hours As Needed for Mild Pain., Disp: , Rfl:     Lactobacillus (Florajen Acidophilus) capsule, Take 1 capsule by mouth Daily., Disp: 28 capsule, Rfl: 0    Menthol-Zinc Oxide 0.44-20.6 % ointment, Apply 1 Application topically to the appropriate area as directed 2 (Two) Times a Day. With calamine, i.e. Calmoseptine, Disp: 113 g, Rfl: 1    Misc. Devices (Wheelchair Cushion) misc, Use 1 each See Admin Instructions., Disp: 1 each, Rfl: 0    Nutritional Supplements (Tristian) pack, Take 1 packet by mouth 2 (Two) Times a Day., Disp: 60 packet,  "Rfl: 5    OLANZapine (zyPREXA) 5 MG tablet, Take 1 tablet by mouth Every Night., Disp: , Rfl:     omeprazole (priLOSEC) 40 MG capsule, Take 1 capsule by mouth Daily., Disp: , Rfl:     QUEtiapine (SEROquel) 400 MG tablet, Take 1 tablet by mouth Every Night., Disp: , Rfl:     tamsulosin (FLOMAX) 0.4 MG capsule 24 hr capsule, Take 2 capsules by mouth Daily for 360 days., Disp: 180 capsule, Rfl: 3    traZODone (DESYREL) 150 MG tablet, Take 1 tablet by mouth Every Night., Disp: , Rfl:     Past Medical History:   Diagnosis Date    Anxiety     Chronic tetraplegia     Contracture of hand     LEFT HAND TIGHT HAND CONTRACTURE. RIGHT HAD CAN OPEN 30%.    Drug abuse     Hematuria     Neurogenic bladder     Spinal cord injury at C1-C4 level without spinal injury     Urinary retention      Past Surgical History:   Procedure Laterality Date    ANKLE SURGERY      CERVICAL FUSION      C2-C7    DEBRIDEMENT OF ISCHIAL ULCER/BUTTOCKS WOUND Left 10/30/2024    Procedure: DEBRIDEMENT OF ISCHIAL ULCER/BUTTOCKS WOUND: clean and remove unhealthy tissue from the wound using surgical instruments;  Surgeon: Saeid Quesada MD;  Location: [facility];  Service: General;  Laterality: Left;    FRACTURE SURGERY      ARM    URETEROSCOPY LASER LITHOTRIPSY WITH STENT INSERTION Bilateral 11/21/2023    Procedure: CYSTOSCOPY BILATERAL RETROGRADE PYELOGRAM BLADDER STONE EXTRACTION;  Surgeon: Jennifer Bowman MD;  Location: [facility];  Service: Urology;  Laterality: Bilateral;     Social History     Socioeconomic History    Marital status:    Tobacco Use    Smoking status: Every Day     Current packs/day: 1.00     Types: Cigarettes     Passive exposure: Current    Smokeless tobacco: Never   Vaping Use    Vaping status: Never Used   Substance and Sexual Activity    Alcohol use: Not Currently    Drug use: Yes     Comment: fentanyl powder /percocets \"off the street\" for managing pain    Sexual activity: Defer           Objective "     Vitals:    05/27/25 1539   BP: 106/59   BP Location: Right arm   Patient Position: Lying   Cuff Size: Adult   Pulse: 80   Resp: 18  Comment: O2: 98% on RA   Temp: 97.6 °F (36.4 °C)   TempSrc: Temporal   PainSc: 0-No pain     There is no height or weight on file to calculate BMI.    STEADI Fall Risk Assessment has not been completed.     Review of Systems     ROS:  Per HPI.     I have reviewed the HPI and ROS as documented by MA/RN. Doreen Alaniz MD    Physical Exam     NAD  AAOx3, pleasant, cooperative      Physical Exam  The left gluteal wound is now very small with decreased depth and diameter. The surrounding skin is healthy with no evidence of underlying deep tissue pressure injury and no evidence of infection.         Result Review :  The following data was reviewed by: Doreen Alaniz MD on 05/27/2025:    Prior notes and images.               Assessment and Plan   Diagnoses and all orders for this visit:    1. Pressure injury of left buttock, stage 3 (Primary)  -     Menthol-Zinc Oxide 0.44-20.6 % ointment; Apply 1 Application topically to the appropriate area as directed 2 (Two) Times a Day. With calamine, i.e. Calmoseptine  Dispense: 113 g; Refill: 1    2. History of spinal cord injury    3. Tobacco dependence due to cigarettes        Assessment & Plan  1. Wound on the left ischial/buttock region.  The wound has significantly improved, with only a small area remaining open. The surrounding skin appears healthy with no signs of underlying deep tissue pressure injury or infection. He is advised to apply Calmoseptine to the wound twice daily. A prescription for Calmoseptine will be sent to [facility]. He should maintain offloading to help complete healing and prevent recurrence. If there are any complications or if the wound does not heal as expected, he should contact the clinic.    Follow-up  The patient will follow up in 1 month.      Patient was given instructions and counseling regarding  their condition or for health maintenance advice, as well as the wound care plan and recommendations. They understand and agree with the plan.  They will follow back up here in the clinic but are instructed to contact us in the interim should they have any new, returning, or worsening symptoms or concerns. Please see specific information pulled into the AVS if appropriate.     Dragon Dictation utilized for chart completion.    Follow Up   Return in about 4 weeks (around 6/24/2025).      Doreen Alaniz MD    Patient or patient representative verbalized consent for the use of Ambient Listening during the visit with  Doreen Alaniz MD for chart documentation. 5/27/2025  16:24 EDT

## 2025-05-30 ENCOUNTER — PATIENT ROUNDING (BHMG ONLY) (OUTPATIENT)
Dept: WOUND CARE | Facility: HOSPITAL | Age: 55
End: 2025-05-30
Payer: COMMERCIAL

## 2025-05-30 NOTE — PROGRESS NOTES
Did we do a good job with your visit?  Y      Do you have your medications and/or supplies?  Y      Do you have a follow-up appointment?  Y      Do you have any suggestions to improve our service?  None

## 2025-06-25 ENCOUNTER — OFFICE VISIT (OUTPATIENT)
Dept: WOUND CARE | Facility: HOSPITAL | Age: 55
End: 2025-06-25
Payer: COMMERCIAL

## 2025-06-25 VITALS
DIASTOLIC BLOOD PRESSURE: 72 MMHG | SYSTOLIC BLOOD PRESSURE: 114 MMHG | HEART RATE: 70 BPM | RESPIRATION RATE: 18 BRPM | TEMPERATURE: 98.6 F

## 2025-06-25 DIAGNOSIS — L89.323 PRESSURE INJURY OF LEFT BUTTOCK, STAGE 3: Primary | ICD-10-CM

## 2025-06-25 DIAGNOSIS — Z87.828 HISTORY OF SPINAL CORD INJURY: ICD-10-CM

## 2025-06-25 DIAGNOSIS — F17.210 TOBACCO DEPENDENCE DUE TO CIGARETTES: ICD-10-CM

## 2025-06-25 PROCEDURE — 1159F MED LIST DOCD IN RCRD: CPT | Performed by: EMERGENCY MEDICINE

## 2025-06-25 PROCEDURE — 1160F RVW MEDS BY RX/DR IN RCRD: CPT | Performed by: EMERGENCY MEDICINE

## 2025-06-25 PROCEDURE — G0463 HOSPITAL OUTPT CLINIC VISIT: HCPCS | Performed by: EMERGENCY MEDICINE

## 2025-06-25 NOTE — PROGRESS NOTES
Wound Location: Left Glute    Wound Exudate: moderate  Wound Debridement: Mechanically debrided with normal saline & gauze  Wound Thickness: full    Cleanse with:  NS or antibacterial soap and water    Primary: medi-honey  Secured with: Silicone bordered dressing   Harper-wound: Dry     Instructions: Cleanse wound with ns or antibacterial soap and water, pat dry, Medi-honey to wound base, cover and secure with silicone bordered dressing. Change Daily.     Education: Educated patient/family member/CG on how to apply new dressings, verbal understanding provided. Patient/family member/CG instructed to call with any questions or concerns

## 2025-06-25 NOTE — PROGRESS NOTES
Chief Complaint  Wound Check (Pt here today for wound check to sacrum, pt states the wound is still closed and they are applying Calmoseptine daily. )    Subjective      History of Present Illness    Haider Jacobs  is a 54 y.o. male     History of Present Illness  The patient is a 54-year-old male here for a recheck of a left gluteal wound.  He is accompanied by his wife who does his dressing changes for him    History is reported by other person in the presence of the patient.  He has been applying Calmoseptine to the area. The wound has shown signs of healing, with the size of the hole reducing. However, his wife notes that the thickness of the cream used makes it challenging to assess the wound's condition accurately. He has not attempted any aggressive probing of the wound. Medihoney was previously used on some of his other wounds, but not on this particular one.      MEDICATIONS  Current: Calmoseptine  Past: Medihoney    Allergies:  Varenicline      Current Outpatient Medications:     baclofen (LIORESAL) 20 MG tablet, Take 1 tablet by mouth 3 (Three) Times a Day., Disp: , Rfl:     busPIRone (BUSPAR) 30 MG tablet, Take 1 tablet by mouth 2 (Two) Times a Day., Disp: , Rfl:     folic acid (FOLVITE) 1 MG tablet, Take 1 tablet by mouth Daily., Disp: , Rfl:     gabapentin (NEURONTIN) 300 MG capsule, Take 1 capsule by mouth 3 (Three) Times a Day., Disp: , Rfl:     hydrOXYzine (ATARAX) 50 MG tablet, Take 1 tablet by mouth 2 (Two) Times a Day As Needed for Itching., Disp: , Rfl:     ibuprofen (ADVIL,MOTRIN) 800 MG tablet, Take 1 tablet by mouth Every 6 (Six) Hours As Needed for Mild Pain., Disp: , Rfl:     Lactobacillus (Florajen Acidophilus) capsule, Take 1 capsule by mouth Daily., Disp: 28 capsule, Rfl: 0    Menthol-Zinc Oxide 0.44-20.6 % ointment, Apply 1 Application topically to the appropriate area as directed 2 (Two) Times a Day. With calamine, i.e. Calmoseptine, Disp: 113 g, Rfl: 1    Misc. Devices (Wheelchair  Cushion) misc, Use 1 each See Admin Instructions., Disp: 1 each, Rfl: 0    Nutritional Supplements (Tristian) pack, Take 1 packet by mouth 2 (Two) Times a Day., Disp: 60 packet, Rfl: 5    OLANZapine (zyPREXA) 5 MG tablet, Take 1 tablet by mouth Every Night., Disp: , Rfl:     omeprazole (priLOSEC) 40 MG capsule, Take 1 capsule by mouth Daily., Disp: , Rfl:     QUEtiapine (SEROquel) 400 MG tablet, Take 1 tablet by mouth Every Night., Disp: , Rfl:     tamsulosin (FLOMAX) 0.4 MG capsule 24 hr capsule, Take 2 capsules by mouth Daily for 360 days., Disp: 180 capsule, Rfl: 3    traZODone (DESYREL) 150 MG tablet, Take 1 tablet by mouth Every Night., Disp: , Rfl:     Past Medical History:   Diagnosis Date    Anxiety     Chronic tetraplegia     Contracture of hand     LEFT HAND TIGHT HAND CONTRACTURE. RIGHT HAD CAN OPEN 30%.    Drug abuse     Hematuria     Neurogenic bladder     Spinal cord injury at C1-C4 level without spinal injury     Urinary retention      Past Surgical History:   Procedure Laterality Date    ANKLE SURGERY      CERVICAL FUSION      C2-C7    DEBRIDEMENT OF ISCHIAL ULCER/BUTTOCKS WOUND Left 10/30/2024    Procedure: DEBRIDEMENT OF ISCHIAL ULCER/BUTTOCKS WOUND: clean and remove unhealthy tissue from the wound using surgical instruments;  Surgeon: Saeid Quesada MD;  Location: [facility];  Service: General;  Laterality: Left;    FRACTURE SURGERY      ARM    URETEROSCOPY LASER LITHOTRIPSY WITH STENT INSERTION Bilateral 11/21/2023    Procedure: CYSTOSCOPY BILATERAL RETROGRADE PYELOGRAM BLADDER STONE EXTRACTION;  Surgeon: Jennifer Bowman MD;  Location: [facility] OR;  Service: Urology;  Laterality: Bilateral;     Social History     Socioeconomic History    Marital status:    Tobacco Use    Smoking status: Every Day     Current packs/day: 1.00     Types: Cigarettes     Passive exposure: Current    Smokeless tobacco: Never   Vaping Use    Vaping status: Never Used   Substance and Sexual Activity  "   Alcohol use: Not Currently    Drug use: Yes     Comment: fentanyl powder /percocets \"off the street\" for managing pain    Sexual activity: Defer           Objective     Vitals:    06/25/25 1606   BP: 114/72   BP Location: Right arm   Patient Position: Lying   Cuff Size: Adult   Pulse: 70   Resp: 18  Comment: 94% RA   Temp: 98.6 °F (37 °C)   TempSrc: Temporal   PainSc: 0-No pain     There is no height or weight on file to calculate BMI.    STEADI Fall Risk Assessment has not been completed.     Review of Systems     ROS:  Per HPI.     I have reviewed the HPI and ROS as documented by MA/RN. Doreen Alaniz MD    Physical Exam     NAD  AAOx3, pleasant, cooperative      Physical Exam  The patient's wound is filling in and depth is significantly improved, but there is still a small divot present with a depth of approximately 0.2 cm. No evidence of infection. No surrounding deep tissue pressure injury. Mild dry flaky skin adjacent to the wound is noted.           Result Review :  The following data was reviewed by: Doreen Alaniz MD on 06/25/2025:    Prior notes and images.               Assessment and Plan   Diagnoses and all orders for this visit:    1. Pressure injury of left buttock, stage 3 (Primary)    2. History of spinal cord injury    3. Tobacco dependence due to cigarettes        Assessment & Plan  1. Left gluteal wound.  The wound is showing signs of healing, with a significant reduction in depth and a small divot of approximately 0.2 cm remaining. There is no evidence of infection or surrounding deep tissue pressure injury. Mild dry flaky skin adjacent to the wound is noted. Silver nitrate will be applied to the base of the wound to stimulate healing. Medihoney will be used once daily, and the wound should be covered with a bandage to ensure contact and prevent rubbing off on clothes. He is advised to avoid using other pastes and to stay off the wound as much as possible. If there are any problems or if " the wound appears to be worsening, he should call the clinic.    Follow-up  The patient will follow up in 6 weeks.    PROCEDURE  Silver nitrate was applied to the base of the wound to stimulate healing.      Patient was given instructions and counseling regarding their condition or for health maintenance advice, as well as the wound care plan and recommendations. They understand and agree with the plan.  They will follow back up here in the clinic but are instructed to contact us in the interim should they have any new, returning, or worsening symptoms or concerns. Please see specific information pulled into the AVS if appropriate.     Dragon Dictation utilized for chart completion.    Follow Up   Return in about 6 weeks (around 8/6/2025).      Doreen Alaniz MD    Patient or patient representative verbalized consent for the use of Ambient Listening during the visit with  Doreen Alaniz MD for chart documentation. 6/25/2025  16:38 EDT   Intermediate Repair And Graft Additional Text (Will Appearing After The Standard Complex Repair Text): The intermediate repair was not sufficient to completely close the primary defect. The remaining additional defect was repaired with the graft mentioned below.

## (undated) DEVICE — GW ZIPWIRE STD/SHFT STR TPR/3CM .038IN 150CM

## (undated) DEVICE — STERILE POLYISOPRENE POWDER-FREE SURGICAL GLOVES: Brand: PROTEXIS

## (undated) DEVICE — 3M™ IOBAN™ 2 ANTIMICROBIAL INCISE DRAPE 6650EZ: Brand: IOBAN™ 2

## (undated) DEVICE — SYR LUERLOK 30CC

## (undated) DEVICE — SOL IRR H2O BO 1000ML STRL

## (undated) DEVICE — GLV SURG SENSICARE SLT PF LF 6.5 STRL

## (undated) DEVICE — Y-TYPE TUR/BLADDER IRRIGATION SET, REGULATING CLAMP

## (undated) DEVICE — GOWN,REINFORCE,POLY,SIRUS,BREATH SLV,XLG: Brand: MEDLINE

## (undated) DEVICE — SOL IRR NACL 0.9PCT 3000ML

## (undated) DEVICE — SKIN PREP TRAY W/CHG: Brand: MEDLINE INDUSTRIES, INC.

## (undated) DEVICE — ENDOSCOPIC VALVE WITH ADAPTER.: Brand: SURSEAL® II

## (undated) DEVICE — GLV SURG BIOGEL LTX PF 7 1/2

## (undated) DEVICE — GW ZIPWIRE STD/SHFT STR TPR/3CM .035IN 150CM

## (undated) DEVICE — GOWN,REINFRCE,POLY,SIRUS,BREATH SLV,XXLG: Brand: MEDLINE

## (undated) DEVICE — CYSTO PACK: Brand: MEDLINE INDUSTRIES, INC.

## (undated) DEVICE — BASIC SINGLE BASIN-LF: Brand: MEDLINE INDUSTRIES, INC.

## (undated) DEVICE — STERILE POLYISOPRENE POWDER-FREE SURGICAL GLOVES WITH EMOLLIENT COATING: Brand: PROTEXIS

## (undated) DEVICE — MAJOR-LF: Brand: MEDLINE INDUSTRIES, INC.

## (undated) DEVICE — INTENDED FOR TISSUE SEPARATION, AND OTHER PROCEDURES THAT REQUIRE A SHARP SURGICAL BLADE TO PUNCTURE OR CUT.: Brand: BARD-PARKER ® CARBON RIB-BACK BLADES

## (undated) DEVICE — PENCL SMOKE/EVAC MEGADYNE TELESCP 10FT

## (undated) DEVICE — CATH URETRL CONE/TIP 4.8F70CM LT

## (undated) DEVICE — SYS IRR PUMP SGL ACTN VAC SYR 10CC

## (undated) DEVICE — CATH 2L URETRL HC 6F 50CM

## (undated) DEVICE — DRAPE,LAPAROTOMY,PCH,STERILE: Brand: MEDLINE

## (undated) DEVICE — URETERAL ACCESS SHEATH SET: Brand: NAVIGATOR HD

## (undated) DEVICE — Device